# Patient Record
Sex: FEMALE | Race: BLACK OR AFRICAN AMERICAN | NOT HISPANIC OR LATINO | URBAN - METROPOLITAN AREA
[De-identification: names, ages, dates, MRNs, and addresses within clinical notes are randomized per-mention and may not be internally consistent; named-entity substitution may affect disease eponyms.]

---

## 2017-03-19 ENCOUNTER — INPATIENT (INPATIENT)
Facility: HOSPITAL | Age: 82
LOS: 9 days | Discharge: HOME HEALTH | End: 2017-03-29
Attending: INTERNAL MEDICINE | Admitting: INTERNAL MEDICINE
Payer: MEDICARE

## 2017-03-19 VITALS
WEIGHT: 139.99 LBS | TEMPERATURE: 98 F | OXYGEN SATURATION: 98 % | DIASTOLIC BLOOD PRESSURE: 76 MMHG | RESPIRATION RATE: 19 BRPM | HEIGHT: 60 IN | HEART RATE: 81 BPM | SYSTOLIC BLOOD PRESSURE: 109 MMHG

## 2017-03-19 DIAGNOSIS — Z99.3 DEPENDENCE ON WHEELCHAIR: ICD-10-CM

## 2017-03-19 DIAGNOSIS — G30.9 ALZHEIMER'S DISEASE, UNSPECIFIED: ICD-10-CM

## 2017-03-19 DIAGNOSIS — F02.81 DEMENTIA IN OTHER DISEASES CLASSIFIED ELSEWHERE, UNSPECIFIED SEVERITY, WITH BEHAVIORAL DISTURBANCE: ICD-10-CM

## 2017-03-19 DIAGNOSIS — G92 TOXIC ENCEPHALOPATHY: ICD-10-CM

## 2017-03-19 DIAGNOSIS — K21.9 GASTRO-ESOPHAGEAL REFLUX DISEASE WITHOUT ESOPHAGITIS: ICD-10-CM

## 2017-03-19 DIAGNOSIS — Z79.01 LONG TERM (CURRENT) USE OF ANTICOAGULANTS: ICD-10-CM

## 2017-03-19 DIAGNOSIS — Z86.711 PERSONAL HISTORY OF PULMONARY EMBOLISM: ICD-10-CM

## 2017-03-19 DIAGNOSIS — Z99.81 DEPENDENCE ON SUPPLEMENTAL OXYGEN: ICD-10-CM

## 2017-03-19 DIAGNOSIS — Z87.891 PERSONAL HISTORY OF NICOTINE DEPENDENCE: ICD-10-CM

## 2017-03-19 DIAGNOSIS — J96.21 ACUTE AND CHRONIC RESPIRATORY FAILURE WITH HYPOXIA: ICD-10-CM

## 2017-03-19 DIAGNOSIS — Z85.43 PERSONAL HISTORY OF MALIGNANT NEOPLASM OF OVARY: ICD-10-CM

## 2017-03-19 DIAGNOSIS — A41.9 SEPSIS, UNSPECIFIED ORGANISM: ICD-10-CM

## 2017-03-19 DIAGNOSIS — Z78.1 PHYSICAL RESTRAINT STATUS: ICD-10-CM

## 2017-03-19 DIAGNOSIS — Z88.8 ALLERGY STATUS TO OTHER DRUGS, MEDICAMENTS AND BIOLOGICAL SUBSTANCES STATUS: ICD-10-CM

## 2017-03-19 DIAGNOSIS — J96.22 ACUTE AND CHRONIC RESPIRATORY FAILURE WITH HYPERCAPNIA: ICD-10-CM

## 2017-03-19 DIAGNOSIS — I44.1 ATRIOVENTRICULAR BLOCK, SECOND DEGREE: ICD-10-CM

## 2017-03-19 DIAGNOSIS — N39.0 URINARY TRACT INFECTION, SITE NOT SPECIFIED: ICD-10-CM

## 2017-03-19 DIAGNOSIS — J44.1 CHRONIC OBSTRUCTIVE PULMONARY DISEASE WITH (ACUTE) EXACERBATION: ICD-10-CM

## 2017-03-19 DIAGNOSIS — D64.9 ANEMIA, UNSPECIFIED: ICD-10-CM

## 2017-03-19 DIAGNOSIS — J18.9 PNEUMONIA, UNSPECIFIED ORGANISM: ICD-10-CM

## 2017-03-19 DIAGNOSIS — J44.0 CHRONIC OBSTRUCTIVE PULMONARY DISEASE WITH ACUTE LOWER RESPIRATORY INFECTION: ICD-10-CM

## 2017-03-19 DIAGNOSIS — J47.9 BRONCHIECTASIS, UNCOMPLICATED: ICD-10-CM

## 2017-03-19 DIAGNOSIS — J20.9 ACUTE BRONCHITIS, UNSPECIFIED: ICD-10-CM

## 2017-03-19 DIAGNOSIS — R00.1 BRADYCARDIA, UNSPECIFIED: ICD-10-CM

## 2017-03-19 LAB
ALBUMIN SERPL ELPH-MCNC: 3 G/DL — LOW (ref 3.3–5)
ALP SERPL-CCNC: 101 U/L — SIGNIFICANT CHANGE UP (ref 40–120)
ALT FLD-CCNC: 12 U/L — SIGNIFICANT CHANGE UP (ref 12–78)
ANION GAP SERPL CALC-SCNC: 6 MMOL/L — SIGNIFICANT CHANGE UP (ref 5–17)
AST SERPL-CCNC: 15 U/L — SIGNIFICANT CHANGE UP (ref 15–37)
BASE EXCESS BLDA CALC-SCNC: 6.5 MMOL/L — HIGH (ref -2–2)
BASOPHILS # BLD AUTO: 0.2 K/UL — SIGNIFICANT CHANGE UP (ref 0–0.2)
BASOPHILS NFR BLD AUTO: 1.8 % — SIGNIFICANT CHANGE UP (ref 0–2)
BILIRUB SERPL-MCNC: 0.6 MG/DL — SIGNIFICANT CHANGE UP (ref 0.2–1.2)
BLOOD GAS COMMENTS: SIGNIFICANT CHANGE UP
BLOOD GAS COMMENTS: SIGNIFICANT CHANGE UP
BLOOD GAS SOURCE: SIGNIFICANT CHANGE UP
BUN SERPL-MCNC: 17 MG/DL — SIGNIFICANT CHANGE UP (ref 7–23)
CALCIUM SERPL-MCNC: 8.5 MG/DL — SIGNIFICANT CHANGE UP (ref 8.5–10.1)
CHLORIDE SERPL-SCNC: 101 MMOL/L — SIGNIFICANT CHANGE UP (ref 96–108)
CO2 SERPL-SCNC: 37 MMOL/L — HIGH (ref 22–31)
CREAT SERPL-MCNC: 1.12 MG/DL — SIGNIFICANT CHANGE UP (ref 0.5–1.3)
D DIMER BLD IA.RAPID-MCNC: 1618 NG/ML DDU — HIGH
EOSINOPHIL # BLD AUTO: 0.1 K/UL — SIGNIFICANT CHANGE UP (ref 0–0.5)
EOSINOPHIL NFR BLD AUTO: 1.3 % — SIGNIFICANT CHANGE UP (ref 0–6)
GLUCOSE SERPL-MCNC: 110 MG/DL — HIGH (ref 70–99)
HCO3 BLDA-SCNC: 34 MMOL/L — HIGH (ref 21–29)
HCT VFR BLD CALC: 35.5 % — SIGNIFICANT CHANGE UP (ref 34.5–45)
HGB BLD-MCNC: 10.9 G/DL — LOW (ref 11.5–15.5)
HOROWITZ INDEX BLDA+IHG-RTO: 60 — SIGNIFICANT CHANGE UP
LACTATE SERPL-SCNC: 2.2 MMOL/L — HIGH (ref 0.7–2)
LYMPHOCYTES # BLD AUTO: 2 K/UL — SIGNIFICANT CHANGE UP (ref 1–3.3)
LYMPHOCYTES # BLD AUTO: 22.4 % — SIGNIFICANT CHANGE UP (ref 13–44)
MCHC RBC-ENTMCNC: 27.2 PG — SIGNIFICANT CHANGE UP (ref 27–34)
MCHC RBC-ENTMCNC: 30.6 GM/DL — LOW (ref 32–36)
MCV RBC AUTO: 89 FL — SIGNIFICANT CHANGE UP (ref 80–100)
MONOCYTES # BLD AUTO: 1 K/UL — HIGH (ref 0–0.9)
MONOCYTES NFR BLD AUTO: 11.1 % — SIGNIFICANT CHANGE UP (ref 2–14)
NEUTROPHILS # BLD AUTO: 5.7 K/UL — SIGNIFICANT CHANGE UP (ref 1.8–7.4)
NEUTROPHILS NFR BLD AUTO: 63.4 % — SIGNIFICANT CHANGE UP (ref 43–77)
PCO2 BLDA: 68 MMHG — HIGH (ref 32–46)
PH BLD: 7.32 — LOW (ref 7.35–7.45)
PLATELET # BLD AUTO: 259 K/UL — SIGNIFICANT CHANGE UP (ref 150–400)
PO2 BLDA: 80 MMHG — SIGNIFICANT CHANGE UP (ref 74–108)
POTASSIUM SERPL-MCNC: 4 MMOL/L — SIGNIFICANT CHANGE UP (ref 3.5–5.3)
POTASSIUM SERPL-SCNC: 4 MMOL/L — SIGNIFICANT CHANGE UP (ref 3.5–5.3)
PROT SERPL-MCNC: 8.6 GM/DL — HIGH (ref 6–8.3)
RBC # BLD: 3.99 M/UL — SIGNIFICANT CHANGE UP (ref 3.8–5.2)
RBC # FLD: 20.3 % — HIGH (ref 11–15)
SAO2 % BLDA: 94 % — SIGNIFICANT CHANGE UP (ref 92–96)
SODIUM SERPL-SCNC: 144 MMOL/L — SIGNIFICANT CHANGE UP (ref 135–145)
TROPONIN I SERPL-MCNC: <.015 NG/ML — SIGNIFICANT CHANGE UP (ref 0.01–0.04)
WBC # BLD: 9.1 K/UL — SIGNIFICANT CHANGE UP (ref 3.8–10.5)
WBC # FLD AUTO: 9.1 K/UL — SIGNIFICANT CHANGE UP (ref 3.8–10.5)

## 2017-03-19 PROCEDURE — 99291 CRITICAL CARE FIRST HOUR: CPT

## 2017-03-19 PROCEDURE — 71010: CPT | Mod: 26

## 2017-03-19 RX ORDER — VANCOMYCIN HCL 1 G
1000 VIAL (EA) INTRAVENOUS ONCE
Qty: 0 | Refills: 0 | Status: COMPLETED | OUTPATIENT
Start: 2017-03-19 | End: 2017-03-19

## 2017-03-19 RX ORDER — PIPERACILLIN AND TAZOBACTAM 4; .5 G/20ML; G/20ML
3.38 INJECTION, POWDER, LYOPHILIZED, FOR SOLUTION INTRAVENOUS ONCE
Qty: 0 | Refills: 0 | Status: COMPLETED | OUTPATIENT
Start: 2017-03-19 | End: 2017-03-19

## 2017-03-19 RX ORDER — IPRATROPIUM/ALBUTEROL SULFATE 18-103MCG
3 AEROSOL WITH ADAPTER (GRAM) INHALATION
Qty: 0 | Refills: 0 | Status: COMPLETED | OUTPATIENT
Start: 2017-03-19 | End: 2017-03-19

## 2017-03-19 RX ADMIN — Medication 250 MILLIGRAM(S): at 22:41

## 2017-03-19 RX ADMIN — Medication 3 MILLILITER(S): at 21:57

## 2017-03-19 RX ADMIN — PIPERACILLIN AND TAZOBACTAM 200 GRAM(S): 4; .5 INJECTION, POWDER, LYOPHILIZED, FOR SOLUTION INTRAVENOUS at 21:56

## 2017-03-19 RX ADMIN — Medication 3 MILLILITER(S): at 21:00

## 2017-03-19 RX ADMIN — Medication 3 MILLILITER(S): at 21:30

## 2017-03-19 NOTE — ED PROVIDER NOTE - ADDITIONAL HPI
PMHx:  no pertinent Pmhx except mentioned in HPI  PSHx: no pertinent Pshx except mentioned in  HPI  FHx:  no pertinent Fhx except mentioned in  HPI  Soc Hx: unknown

## 2017-03-19 NOTE — ED PROVIDER NOTE - PHYSICAL EXAMINATION
GEN: Alert, NAD  HEAD: atraumatic, EOMI, PERRL, normal lids/conjunctiva  ENT: normal hearing, patent oropharynx without erythema/exudate, uvula midline  NECK: +supple, no tenderness/meningismus, +Trachea midline  PULM: Bilateral BS,mild resp effort, no wheeze/stridor/retractions, mild diffuse rhonochi  CV: RRR, no M/R/G, +dist ext pulses  ABD: soft, NT/ND  BACK: no CVAT, no midline tenderness  MSK: no edema/erythema/cyanosis  SKIN: no rash  NEURO: Alert, no sensory/motor deficits, at her baseline

## 2017-03-19 NOTE — ED ADULT NURSE NOTE - OBJECTIVE STATEMENT
Pt is 89 /yo female BIBA for SOB, cough, fever/ chills. As per family pt has SOB w/ productive cough. Pt has hx of dementia, htn. PE, aspirated pneumonia, Pt is 89 /yo female A&Ox1  BIBA for SOB, cough, fever/ chills. As per family pt has SOB w/ productive cough x 2 days. Pt has hx of dementia, htn. PE, aspirated pneumonia.

## 2017-03-19 NOTE — ED ADULT TRIAGE NOTE - BP NONINVASIVE DIASTOLIC (MM HG)
"You can access the [facility], offered by [facility], by registering with the following website: [email or web address]"
76

## 2017-03-19 NOTE — ED PROVIDER NOTE - PRESENTING SYMPTOMS DETAILS
89 year old female PMH; HTN, Dementia, hx PE comes in w sob. started 2 days ago per family, w mild cough and a fever. no v/d. no signs of abdpain or pain elsewehre per family. pt nonverbal but neurologicalyl at her baseline per family  ros: limited

## 2017-03-19 NOTE — ED ADULT TRIAGE NOTE - CHIEF COMPLAINT QUOTE
Patient BIBA: patient with difficultly breathing  for 1 week; saturation between 60-90 on 3lpm nasal cannula at home; on EMS arrival saturation 93% on NC, 100% on non rebreather. Patient with dementia. Respirations even non-labored.

## 2017-03-20 DIAGNOSIS — I10 ESSENTIAL (PRIMARY) HYPERTENSION: ICD-10-CM

## 2017-03-20 DIAGNOSIS — J18.9 PNEUMONIA, UNSPECIFIED ORGANISM: ICD-10-CM

## 2017-03-20 LAB
ALBUMIN SERPL ELPH-MCNC: 2.6 G/DL — LOW (ref 3.3–5)
ALP SERPL-CCNC: 84 U/L — SIGNIFICANT CHANGE UP (ref 40–120)
ALT FLD-CCNC: 12 U/L — SIGNIFICANT CHANGE UP (ref 12–78)
ANION GAP SERPL CALC-SCNC: 8 MMOL/L — SIGNIFICANT CHANGE UP (ref 5–17)
APPEARANCE UR: ABNORMAL
AST SERPL-CCNC: 15 U/L — SIGNIFICANT CHANGE UP (ref 15–37)
BACTERIA # UR AUTO: ABNORMAL
BASE EXCESS BLDA CALC-SCNC: 5.9 MMOL/L — HIGH (ref -2–2)
BASE EXCESS BLDA CALC-SCNC: 7.1 MMOL/L — HIGH (ref -2–2)
BASE EXCESS BLDA CALC-SCNC: 7.1 MMOL/L — HIGH (ref -2–2)
BASE EXCESS BLDA CALC-SCNC: 7.5 MMOL/L — HIGH (ref -2–2)
BILIRUB SERPL-MCNC: 0.4 MG/DL — SIGNIFICANT CHANGE UP (ref 0.2–1.2)
BILIRUB UR-MCNC: NEGATIVE — SIGNIFICANT CHANGE UP
BLOOD GAS COMMENTS: SIGNIFICANT CHANGE UP
BLOOD GAS SOURCE: SIGNIFICANT CHANGE UP
BUN SERPL-MCNC: 18 MG/DL — SIGNIFICANT CHANGE UP (ref 7–23)
CALCIUM SERPL-MCNC: 7.7 MG/DL — LOW (ref 8.5–10.1)
CHLORIDE SERPL-SCNC: 102 MMOL/L — SIGNIFICANT CHANGE UP (ref 96–108)
CO2 SERPL-SCNC: 33 MMOL/L — HIGH (ref 22–31)
COLOR SPEC: YELLOW — SIGNIFICANT CHANGE UP
CREAT SERPL-MCNC: 1.04 MG/DL — SIGNIFICANT CHANGE UP (ref 0.5–1.3)
DIFF PNL FLD: ABNORMAL
EPI CELLS # UR: SIGNIFICANT CHANGE UP
GLUCOSE SERPL-MCNC: 89 MG/DL — SIGNIFICANT CHANGE UP (ref 70–99)
GLUCOSE UR QL: NEGATIVE MG/DL — SIGNIFICANT CHANGE UP
HCO3 BLDA-SCNC: 32 MMOL/L — HIGH (ref 21–29)
HCO3 BLDA-SCNC: 33 MMOL/L — HIGH (ref 21–29)
HCO3 BLDA-SCNC: 34 MMOL/L — HIGH (ref 21–29)
HCO3 BLDA-SCNC: 34 MMOL/L — HIGH (ref 21–29)
HCT VFR BLD CALC: 30.5 % — LOW (ref 34.5–45)
HGB BLD-MCNC: 9.7 G/DL — LOW (ref 11.5–15.5)
HOROWITZ INDEX BLDA+IHG-RTO: 45 — SIGNIFICANT CHANGE UP
HOROWITZ INDEX BLDA+IHG-RTO: 50 — SIGNIFICANT CHANGE UP
HOROWITZ INDEX BLDA+IHG-RTO: 50 — SIGNIFICANT CHANGE UP
HOROWITZ INDEX BLDA+IHG-RTO: 60 — SIGNIFICANT CHANGE UP
KETONES UR-MCNC: NEGATIVE — SIGNIFICANT CHANGE UP
LACTATE SERPL-SCNC: 0.8 MMOL/L — SIGNIFICANT CHANGE UP (ref 0.7–2)
LACTATE SERPL-SCNC: 3 MMOL/L — HIGH (ref 0.7–2)
LEUKOCYTE ESTERASE UR-ACNC: ABNORMAL
MAGNESIUM SERPL-MCNC: 2.1 MG/DL — SIGNIFICANT CHANGE UP (ref 1.8–2.4)
MCHC RBC-ENTMCNC: 27.9 PG — SIGNIFICANT CHANGE UP (ref 27–34)
MCHC RBC-ENTMCNC: 32 GM/DL — SIGNIFICANT CHANGE UP (ref 32–36)
MCV RBC AUTO: 87.4 FL — SIGNIFICANT CHANGE UP (ref 80–100)
NITRITE UR-MCNC: NEGATIVE — SIGNIFICANT CHANGE UP
PCO2 BLDA: 48 MMHG — HIGH (ref 32–46)
PCO2 BLDA: 63 MMHG — HIGH (ref 32–46)
PCO2 BLDA: 68 MMHG — HIGH (ref 32–46)
PCO2 BLDA: 70 MMHG — CRITICAL HIGH (ref 32–46)
PH BLD: 7.31 — LOW (ref 7.35–7.45)
PH BLD: 7.31 — LOW (ref 7.35–7.45)
PH BLD: 7.35 — SIGNIFICANT CHANGE UP (ref 7.35–7.45)
PH BLD: 7.44 — SIGNIFICANT CHANGE UP (ref 7.35–7.45)
PH UR: 5 — SIGNIFICANT CHANGE UP (ref 4.8–8)
PHOSPHATE SERPL-MCNC: 3.7 MG/DL — SIGNIFICANT CHANGE UP (ref 2.5–4.5)
PLATELET # BLD AUTO: 206 K/UL — SIGNIFICANT CHANGE UP (ref 150–400)
PO2 BLDA: 67 MMHG — LOW (ref 74–108)
PO2 BLDA: 69 MMHG — LOW (ref 74–108)
PO2 BLDA: 80 MMHG — SIGNIFICANT CHANGE UP (ref 74–108)
PO2 BLDA: 85 MMHG — SIGNIFICANT CHANGE UP (ref 74–108)
POTASSIUM SERPL-MCNC: 4.3 MMOL/L — SIGNIFICANT CHANGE UP (ref 3.5–5.3)
POTASSIUM SERPL-SCNC: 4.3 MMOL/L — SIGNIFICANT CHANGE UP (ref 3.5–5.3)
PROT SERPL-MCNC: 7.3 GM/DL — SIGNIFICANT CHANGE UP (ref 6–8.3)
PROT UR-MCNC: 30 MG/DL
RBC # BLD: 3.49 M/UL — LOW (ref 3.8–5.2)
RBC # FLD: 19.9 % — HIGH (ref 11–15)
RBC CASTS # UR COMP ASSIST: ABNORMAL /HPF (ref 0–4)
SAO2 % BLDA: 90 % — LOW (ref 92–96)
SAO2 % BLDA: 91 % — LOW (ref 92–96)
SAO2 % BLDA: 95 % — SIGNIFICANT CHANGE UP (ref 92–96)
SAO2 % BLDA: 96 % — SIGNIFICANT CHANGE UP (ref 92–96)
SODIUM SERPL-SCNC: 143 MMOL/L — SIGNIFICANT CHANGE UP (ref 135–145)
SP GR SPEC: 1.01 — SIGNIFICANT CHANGE UP (ref 1.01–1.02)
UROBILINOGEN FLD QL: NEGATIVE MG/DL — SIGNIFICANT CHANGE UP
WBC # BLD: 7.6 K/UL — SIGNIFICANT CHANGE UP (ref 3.8–10.5)
WBC # FLD AUTO: 7.6 K/UL — SIGNIFICANT CHANGE UP (ref 3.8–10.5)
WBC UR QL: >50

## 2017-03-20 PROCEDURE — 71010: CPT | Mod: 26

## 2017-03-20 PROCEDURE — 71275 CT ANGIOGRAPHY CHEST: CPT | Mod: 26

## 2017-03-20 PROCEDURE — 99291 CRITICAL CARE FIRST HOUR: CPT

## 2017-03-20 RX ORDER — PANTOPRAZOLE SODIUM 20 MG/1
40 TABLET, DELAYED RELEASE ORAL
Qty: 0 | Refills: 0 | Status: DISCONTINUED | OUTPATIENT
Start: 2017-03-20 | End: 2017-03-29

## 2017-03-20 RX ORDER — IPRATROPIUM/ALBUTEROL SULFATE 18-103MCG
3 AEROSOL WITH ADAPTER (GRAM) INHALATION EVERY 6 HOURS
Qty: 0 | Refills: 0 | Status: DISCONTINUED | OUTPATIENT
Start: 2017-03-20 | End: 2017-03-29

## 2017-03-20 RX ORDER — AZITHROMYCIN 500 MG/1
TABLET, FILM COATED ORAL
Qty: 0 | Refills: 0 | Status: DISCONTINUED | OUTPATIENT
Start: 2017-03-20 | End: 2017-03-24

## 2017-03-20 RX ORDER — AZITHROMYCIN 500 MG/1
500 TABLET, FILM COATED ORAL EVERY 24 HOURS
Qty: 0 | Refills: 0 | Status: DISCONTINUED | OUTPATIENT
Start: 2017-03-21 | End: 2017-03-24

## 2017-03-20 RX ORDER — CEFTRIAXONE 500 MG/1
1 INJECTION, POWDER, FOR SOLUTION INTRAMUSCULAR; INTRAVENOUS EVERY 24 HOURS
Qty: 0 | Refills: 0 | Status: DISCONTINUED | OUTPATIENT
Start: 2017-03-21 | End: 2017-03-24

## 2017-03-20 RX ORDER — HEPARIN SODIUM 5000 [USP'U]/ML
5000 INJECTION INTRAVENOUS; SUBCUTANEOUS EVERY 12 HOURS
Qty: 0 | Refills: 0 | Status: DISCONTINUED | OUTPATIENT
Start: 2017-03-20 | End: 2017-03-29

## 2017-03-20 RX ORDER — CEFTRIAXONE 500 MG/1
1 INJECTION, POWDER, FOR SOLUTION INTRAMUSCULAR; INTRAVENOUS ONCE
Qty: 0 | Refills: 0 | Status: COMPLETED | OUTPATIENT
Start: 2017-03-20 | End: 2017-03-20

## 2017-03-20 RX ORDER — AMLODIPINE BESYLATE 2.5 MG/1
2.5 TABLET ORAL DAILY
Qty: 0 | Refills: 0 | Status: DISCONTINUED | OUTPATIENT
Start: 2017-03-20 | End: 2017-03-29

## 2017-03-20 RX ORDER — SODIUM CHLORIDE 9 MG/ML
500 INJECTION INTRAMUSCULAR; INTRAVENOUS; SUBCUTANEOUS ONCE
Qty: 0 | Refills: 0 | Status: COMPLETED | OUTPATIENT
Start: 2017-03-20 | End: 2017-03-20

## 2017-03-20 RX ORDER — CEFTRIAXONE 500 MG/1
INJECTION, POWDER, FOR SOLUTION INTRAMUSCULAR; INTRAVENOUS
Qty: 0 | Refills: 0 | Status: DISCONTINUED | OUTPATIENT
Start: 2017-03-20 | End: 2017-03-24

## 2017-03-20 RX ORDER — AZITHROMYCIN 500 MG/1
500 TABLET, FILM COATED ORAL ONCE
Qty: 0 | Refills: 0 | Status: COMPLETED | OUTPATIENT
Start: 2017-03-20 | End: 2017-03-20

## 2017-03-20 RX ADMIN — HEPARIN SODIUM 5000 UNIT(S): 5000 INJECTION INTRAVENOUS; SUBCUTANEOUS at 06:20

## 2017-03-20 RX ADMIN — Medication 3 MILLILITER(S): at 06:20

## 2017-03-20 RX ADMIN — HEPARIN SODIUM 5000 UNIT(S): 5000 INJECTION INTRAVENOUS; SUBCUTANEOUS at 18:02

## 2017-03-20 RX ADMIN — AZITHROMYCIN 255 MILLIGRAM(S): 500 TABLET, FILM COATED ORAL at 12:30

## 2017-03-20 RX ADMIN — CEFTRIAXONE 100 GRAM(S): 500 INJECTION, POWDER, FOR SOLUTION INTRAMUSCULAR; INTRAVENOUS at 12:29

## 2017-03-20 RX ADMIN — Medication 3 MILLILITER(S): at 17:27

## 2017-03-20 RX ADMIN — Medication 3 MILLILITER(S): at 12:49

## 2017-03-20 RX ADMIN — SODIUM CHLORIDE 100 MILLILITER(S): 9 INJECTION INTRAMUSCULAR; INTRAVENOUS; SUBCUTANEOUS at 05:32

## 2017-03-20 NOTE — H&P ADULT. - HISTORY OF PRESENT ILLNESS
Dr. Michi Best D.P.M.   [facility]  [address] [place] [address] 101  232 [facility] 97  5/25/2021    Post Operative Nail/Wart Instructions    Soaking solution: Betadine Solution 89 Y/O female PMHx of Dementia, PE, chair bound at home with 24 hour aide BIBEMS for decreasing O2 saturation going from 2LNC to 4LNC at home. While in ED, pt was documented has having increasing CO2 levels while on BIPAP. ICU called for further evaluation.

## 2017-03-20 NOTE — H&P ADULT. - PSH
S/P [facility] Biopsy of Cervix with vag laceration repair    S/P D&C, operative hysteroscopy    Sigmoidoscopy Exam - Negative

## 2017-03-20 NOTE — H&P ADULT. - PROBLEM SELECTOR PLAN 1
1. admit to ICU under Dr. Delvin mercer/ [facility] for medicine.  2. repeat CXR and ABG, make bipap changes accordingly  3. duonebs q 6 hrs  4. consider intubation if condition worsens

## 2017-03-20 NOTE — H&P ADULT. - ATTENDING COMMENTS
Pt is 89 yo F with h/o HTN, PE, on home O2?, dementia and chair bound at home with 24 hour aide BIBEMS for decreasing O2 saturation going from 2LNC to 4LNC at home. While in ED, pt was documented has having increasing CO2 levels while on BIPAP. ICU called for further evaluation. Pt is 89 yo F with h/o HTN, PE, on home O2?, dementia and chair bound at home with 24 hour aide BIBEMS for decreasing O2 saturation going from 2LNC to 4LNC at home. In the ER pt was found to be mildly hypercapneic and placed on BiPAP. Pt worked up with CTA and found to have possible R LL PNA.    Vs as per EMR    Face: BiPAP mask  Lungs: CTA  Heart: S1S2  Abd: Soft/+BS  Ext: No edema  Neuro: Awake but not following commands    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 is a COPDer)/ 2) R LL PNA    Resp: Cont BiPAP and Nebs  ID: Start on Ceftriaxone and Zithromax  CVS: Transient hypotension and bradycardia  FEN: NPO while on BiPAP  GI: PPI  Neuro: MS probably at baseline  Social: Obtain more MHx and discuss code staus    CCT 40 min

## 2017-03-20 NOTE — H&P ADULT. - PMH
Alzheimer's Dementia with Behavioral Disturbance    Benign Essential Hypertension    Ovarian cancer    PE (pulmonary thromboembolism)    UTI (urinary tract infection)

## 2017-03-21 DIAGNOSIS — N39.0 URINARY TRACT INFECTION, SITE NOT SPECIFIED: ICD-10-CM

## 2017-03-21 LAB
ANION GAP SERPL CALC-SCNC: 11 MMOL/L — SIGNIFICANT CHANGE UP (ref 5–17)
BUN SERPL-MCNC: 15 MG/DL — SIGNIFICANT CHANGE UP (ref 7–23)
CALCIUM SERPL-MCNC: 8.5 MG/DL — SIGNIFICANT CHANGE UP (ref 8.5–10.1)
CHLORIDE SERPL-SCNC: 101 MMOL/L — SIGNIFICANT CHANGE UP (ref 96–108)
CO2 SERPL-SCNC: 30 MMOL/L — SIGNIFICANT CHANGE UP (ref 22–31)
CREAT SERPL-MCNC: 0.67 MG/DL — SIGNIFICANT CHANGE UP (ref 0.5–1.3)
CULTURE RESULTS: NO GROWTH — SIGNIFICANT CHANGE UP
GLUCOSE SERPL-MCNC: 61 MG/DL — LOW (ref 70–99)
HCT VFR BLD CALC: 31.7 % — LOW (ref 34.5–45)
HGB BLD-MCNC: 9.3 G/DL — LOW (ref 11.5–15.5)
MAGNESIUM SERPL-MCNC: 2.1 MG/DL — SIGNIFICANT CHANGE UP (ref 1.8–2.4)
MCHC RBC-ENTMCNC: 26.2 PG — LOW (ref 27–34)
MCHC RBC-ENTMCNC: 29.4 GM/DL — LOW (ref 32–36)
MCV RBC AUTO: 89 FL — SIGNIFICANT CHANGE UP (ref 80–100)
PHOSPHATE SERPL-MCNC: 2.8 MG/DL — SIGNIFICANT CHANGE UP (ref 2.5–4.5)
PLATELET # BLD AUTO: 218 K/UL — SIGNIFICANT CHANGE UP (ref 150–400)
POTASSIUM SERPL-MCNC: 4.3 MMOL/L — SIGNIFICANT CHANGE UP (ref 3.5–5.3)
POTASSIUM SERPL-SCNC: 4.3 MMOL/L — SIGNIFICANT CHANGE UP (ref 3.5–5.3)
RBC # BLD: 3.57 M/UL — LOW (ref 3.8–5.2)
RBC # FLD: 20 % — HIGH (ref 11–15)
SODIUM SERPL-SCNC: 142 MMOL/L — SIGNIFICANT CHANGE UP (ref 135–145)
SPECIMEN SOURCE: SIGNIFICANT CHANGE UP
WBC # BLD: 5.2 K/UL — SIGNIFICANT CHANGE UP (ref 3.8–10.5)
WBC # FLD AUTO: 5.2 K/UL — SIGNIFICANT CHANGE UP (ref 3.8–10.5)

## 2017-03-21 PROCEDURE — 99291 CRITICAL CARE FIRST HOUR: CPT

## 2017-03-21 RX ADMIN — Medication 3 MILLILITER(S): at 00:43

## 2017-03-21 RX ADMIN — PANTOPRAZOLE SODIUM 40 MILLIGRAM(S): 20 TABLET, DELAYED RELEASE ORAL at 05:52

## 2017-03-21 RX ADMIN — AMLODIPINE BESYLATE 2.5 MILLIGRAM(S): 2.5 TABLET ORAL at 05:50

## 2017-03-21 RX ADMIN — Medication 0.5 MILLIGRAM(S): at 23:57

## 2017-03-21 RX ADMIN — AZITHROMYCIN 255 MILLIGRAM(S): 500 TABLET, FILM COATED ORAL at 10:09

## 2017-03-21 RX ADMIN — Medication 3 MILLILITER(S): at 06:26

## 2017-03-21 RX ADMIN — HEPARIN SODIUM 5000 UNIT(S): 5000 INJECTION INTRAVENOUS; SUBCUTANEOUS at 05:51

## 2017-03-21 RX ADMIN — HEPARIN SODIUM 5000 UNIT(S): 5000 INJECTION INTRAVENOUS; SUBCUTANEOUS at 17:45

## 2017-03-21 RX ADMIN — CEFTRIAXONE 100 GRAM(S): 500 INJECTION, POWDER, FOR SOLUTION INTRAMUSCULAR; INTRAVENOUS at 10:09

## 2017-03-21 NOTE — PROGRESS NOTE ADULT - SUBJECTIVE AND OBJECTIVE BOX
HPI:  Pt is 89 yo F with h/o HTN, PE, on home O2?, dementia and chair bound at home with 24 hour aide BIBEMS for decreasing O2 saturation going from 2LNC to 4LNC at home. In the ER pt was found to be mildly hypercapneic and placed on BiPAP. Pt worked up with CTA and found to have possible R LL PNA.    24 hr events: Uneventful and pt off BiPAP    ## Labs:  CBC:                        9.3    5.2   )-----------( 218      ( 21 Mar 2017 03:49 )             31.7     Chem:  21 Mar 2017 03:49    142    |  101    |  15     ----------------------------<  61     4.3     |  30     |  0.67     Ca    8.5        21 Mar 2017 03:49  Phos  2.8       21 Mar 2017 03:49  Mg     2.1       21 Mar 2017 03:49    TPro  7.3    /  Alb  2.6    /  TBili  0.4    /  DBili  x      /  AST  15     /  ALT  12     /  AlkPhos  84     20 Mar 2017 06:31    Coags:    ## Medications:  cefTRIAXone   IVPB  IV Intermittent   azithromycin  IVPB  IV Intermittent   azithromycin  IVPB 500milliGRAM(s) IV Intermittent every 24 hours  cefTRIAXone   IVPB 1Gram(s) IV Intermittent every 24 hours    amLODIPine   Tablet 2.5milliGRAM(s) Oral daily    ALBUTerol/ipratropium for Nebulization 3milliLiter(s) Nebulizer every 6 hours      heparin  Injectable 5000Unit(s) SubCutaneous every 12 hours    pantoprazole    Tablet 40milliGRAM(s) Oral before breakfast      ## Vitals:  T(C): 37.4, Max: 37.7 (03-21 @ 03:30)  HR: 72 (56 - 100)  BP: 137/112 (102/57 - 155/139)  BP(mean): 121 (69 - 146)  RR: 20 (5 - 24)  SpO2: 98% (76% - 100%)  Wt(kg): --  Vent:   ABG: ABG - ( 20 Mar 2017 18:22 )  pH: x     /  pCO2: 48    /  pO2: 80    / HCO3: 32    / Base Excess: 7.5   /  SaO2: 96          I & Os for current day (as of 03-21 @ 16:55)  =============================================  IN: 300 ml / OUT: 0 ml / NET: 300 ml    ## P/E:  Gen: lying comfortably in bed in no apparent distress  Lungs: Rhonchi  Heart: RRR  Abd: Soft/+BS  Ext: No edema  Neuro: Awake but nonverbal    CENTRAL LINE: [ ] YES [x ] NO  LOCATION:   DATE INSERTED:  REMOVE: [ ] YES [ ] NO      GARCIA: [ ] YES [ ] NO    DATE INSERTED:  REMOVE:  [ ] YES [ ] NO      A-LINE:  [ ] YES [x ] NO  LOCATION:   DATE INSERTED:  REMOVE:  [ ] YES [ ] NO  EXPLAIN:    CODE STATUS: [ x] full code  [ ] DNR  [ ] DNI  [ ] MOLST  Goals of care discussion: [ ] yes

## 2017-03-21 NOTE — PHYSICAL THERAPY INITIAL EVALUATION ADULT - CRITERIA FOR SKILLED THERAPEUTIC INTERVENTIONS
anticipated equipment needs at discharge/anticipated discharge recommendation/Home with prior services

## 2017-03-21 NOTE — CONSULT NOTE ADULT - SUBJECTIVE AND OBJECTIVE BOX
Patient is a 84y old  Female who presents with a chief complaint of sob (17 Mar 2017 08:52)      INTERVAL HPI/OVERNIGHT EVENTS:    MEDICATIONS  (STANDING):  losartan 50milliGRAM(s) Oral daily  atorvastatin 80milliGRAM(s) Oral at bedtime  aspirin  chewable 81milliGRAM(s) Oral daily  dextrose 5%. 1000milliLiter(s) IV Continuous <Continuous>  apixaban 5milliGRAM(s) Oral two times a day  carvedilol 25milliGRAM(s) Oral every 12 hours  spironolactone 25milliGRAM(s) Oral daily    MEDICATIONS  (PRN):  guaiFENesin/dextromethorphan  Syrup 10milliLiter(s) Oral every 6 hours PRN Cough  magnesium hydroxide Suspension 30milliLiter(s) Oral daily PRN Constipation      Allergies    penicillin (Unknown)    Intolerances        REVIEW OF SYSTEMS:  CONSTITUTIONAL: No fever, weight loss, or fatigue  EYES: No eye pain, visual disturbances, or discharge  ENMT:  No difficulty hearing, tinnitus, vertigo; No sinus or throat pain  NECK: No pain or stiffness  BREASTS: No pain, masses, or nipple discharge  RESPIRATORY: No cough, wheezing, chills or hemoptysis; No shortness of breath  CARDIOVASCULAR: No chest pain, palpitations, dizziness, or leg swelling  GASTROINTESTINAL: No abdominal or epigastric pain. No nausea, vomiting, or hematemesis; No diarrhea or constipation. No melena or hematochezia.  GENITOURINARY: No dysuria, frequency, hematuria, or incontinence  NEUROLOGICAL: No headaches, memory loss, loss of strength, numbness, or tremors  SKIN: No itching, burning, rashes, or lesions   LYMPH NODES: No enlarged glands  ENDOCRINE: No heat or cold intolerance; No hair loss  MUSCULOSKELETAL: No joint pain or swelling; No muscle, back, or extremity pain  PSYCHIATRIC: No depression, anxiety, mood swings, or difficulty sleeping  HEME/LYMPH: No easy bruising, or bleeding gums  ALLERGY AND IMMUNOLOGIC: No hives or eczema    Vital Signs Last 24 Hrs  T(C): 37.2, Max: 37.2 (03-21 @ 04:44)  T(F): 98.9, Max: 98.9 (03-21 @ 04:44)  HR: 90 (66 - 108)  BP: 122/68 (112/73 - 150/63)  BP(mean): --  RR: 20 (17 - 20)  SpO2: 99% (98% - 100%)    PHYSICAL EXAM:  GENERAL: NAD, well-groomed, well-developed  HEAD:  Atraumatic, Normocephalic  EYES: EOMI, PERRLA, conjunctiva and sclera clear  ENMT: No tonsillar erythema, exudates, or enlargement; Moist mucous membranes, Good dentition, No lesions  NECK: Supple, No JVD, Normal thyroid  NERVOUS SYSTEM:  Alert & Oriented X3, Good concentration; Motor Strength 5/5 B/L upper and lower extremities; DTRs 2+ intact and symmetric  CHEST/LUNG: Clear to percussion bilaterally; No rales, rhonchi, wheezing, or rubs  HEART: Regular rate and rhythm; No murmurs, rubs, or gallops  ABDOMEN: Soft, Nontender, Nondistended; Bowel sounds present  EXTREMITIES:  2+ Peripheral Pulses, No clubbing, cyanosis, or edema  LYMPH: No lymphadenopathy noted  SKIN: No rashes or lesions    LABS:                        12.2   4.3   )-----------( 129      ( 21 Mar 2017 07:57 )             38.5     21 Mar 2017 07:57    143    |  107    |  9      ----------------------------<  82     4.0     |  29     |  0.69     Ca    8.2        21 Mar 2017 07:57      PT/INR - ( 20 Mar 2017 08:17 )   PT: 16.5 sec;   INR: 1.47 ratio             CAPILLARY BLOOD GLUCOSE    CULTURES:    HEMOGLOBIN A1C:    CHOLESTEROL:        RADIOLOGY & ADDITIONAL TESTS:

## 2017-03-21 NOTE — CONSULT NOTE ADULT - SUBJECTIVE AND OBJECTIVE BOX
Patient is a 89y old  Female who presents with a chief complaint of increasing O2 demands .      HPI:  89 Y/O female PMHx, PE,HTN, and Alzheimers Dementia, chair bound at home with 24 hour aide BIBEMS for decreasing O2 saturation going from 2LNC to 4LNC at home. While in ED, pt was documented has having increasing CO2 levels while on BIPAP. Admitted to ICU with continuous BIPAP support. Being transferred to medical floor after improvement. Not able to provide information due to dementia.    PAST MEDICAL & SURGICAL HISTORY:  UTI (urinary tract infection)  PE (pulmonary thromboembolism)  Ovarian cancer  Alzheimer&#x27;s Dementia with Behavioral Disturbance  Benign Essential Hypertension  Sigmoidoscopy Exam - Negative  S/P [facility] Biopsy of Cervix with vag laceration repair  S/P D&amp;C, operative hysteroscopy    FAMILY HISTORY:  No pertinent family history in first degree relatives    SOCIAL HISTORY: not available.    Allergies  Haldol (Unknown)  MEDICATIONS  (STANDING):heparin  Injectable 5000Unit(s) SubCutaneous every 12 hours  ALBUTerol/ipratropium for Nebulization 3milliLiter(s) Nebulizer every 6 hours  amLODIPine   Tablet 2.5milliGRAM(s) Oral daily  pantoprazole    Tablet 40milliGRAM(s) Oral before breakfast  cefTRIAXone   IVPB  IV Intermittent   azithromycin  IVPB  IV Intermittent   azithromycin  IVPB 500milliGRAM(s) IV Intermittent every 24 hours  cefTRIAXone   IVPB 1Gram(s) IV Intermittent every 24 hours    REVIEW OF SYSTEMS: not able to provide.    Vital Signs Last 24 Hrs  T(C): 37.4, Max: 37.7 (03-21 @ 03:30)  T(F): 99.4, Max: 99.9 (03- @ 03:30)  HR: 72 (56 - 100)  BP: 137/112 (102/57 - 155/139)  BP(mean): 121 (69 - 146)  RR: 20 (5 - 24)  SpO2: 98% (76% - 100%)    PHYSICAL EXAM:  GEN:        Awake, responsive and comfortable.  HEENT:    Normal.    RESP:      decreased air entry.  CVS:         Regular rate and rhythm.   ABD:         Soft, non-tender, non-distended;   :             No costovertebral angle tenderness  SKIN:           Warm and dry.  EXTR:            No clubbing, cyanosis or edema  CNS:              confused at base line.  PSYCH:        cooperative, no anxiety or depression    LABS:                        9.3    5.2   )-----------( 218      ( 21 Mar 2017 03:49 )             31.7     21 Mar 2017 03:49    142    |  101    |  15     ----------------------------<  61     4.3     |  30     |  0.67     Ca    8.5        21 Mar 2017 03:49  Phos  2.8       21 Mar 2017 03:49  Mg     2.1       21 Mar 2017 03:49    TPro  7.3    /  Alb  2.6    /  TBili  0.4    /  DBili  x      /  AST  15     /  ALT  12     /  AlkPhos  84     20 Mar 2017 06:31      ABG - 03-20 @ 18:22  pH: 7.44 / pcO2: 48 / pO2: 80 on BIPAP with 45% FIO2.    Urinalysis Basic - ( 20 Mar 2017 18:08 )    Color: Yellow / Appearance: very cloudy / S.015 / pH: x  Gluc: x / Ketone: Negative  / Bili: Negative / Urobili: Negative mg/dL   Blood: x / Protein: 30 mg/dL / Nitrite: Negative   Leuk Esterase: Moderate / RBC: 6-10 /HPF / WBC >50   Sq Epi: x / Non Sq Epi: Few / Bacteria: Many    EKG: sinus rhythm    RADIOLOGY & ADDITIONAL STUDIES:    EXAM:  CHEST SINGLE VIEW                            PROCEDURE DATE:  2017        INTERPRETATION:  PROCEDURE: AP view of the chest.    CLINICAL INFORMATION: Shortness of breath    COMPARISON: 3/19/2017    FINDINGS:        There are no lung consolidations. A small left pleural effusion is noted.   The cardiac and mediastinal contours are prominent, which may be due to   magnification from AP technique and shallow inspiration. The osseous   structures are intact.    IMPRESSION:    No lung consolidations.  Small left pleural effusion.    OTILIO BLOOM M.D., ATTENDING RADIOLOGIST  This document has been electronically signed. Mar 20 2017  8:01AM      ASSESSMENT AND PLAN:  ·	Acute hypoxic/hypercarbic Respiratory failure.  ·	RLL Pneumonia.  ·	Anemia.  ·	HTN.  ·	Alzheimer Dementia.    Supplemental O2 with PRN BIPAP.  Continue nebulizer and antibiotics.

## 2017-03-21 NOTE — CONSULT NOTE ADULT - ASSESSMENT
Patient is a 84y old  Female who presents with a chief complaint of sob placed on BIPAP and admitted to ICU.

## 2017-03-21 NOTE — PHYSICAL THERAPY INITIAL EVALUATION ADULT - MODIFIED CLINICAL TEST OF SENSORY INTEGRATION IN BALANCE TEST
Barthel Index: Feeding Score _0__, Bathing Score _0__, Grooming Score _0__, Dressing Score _0__, Bowels Score _0__, Bladder Score _0__, Toilet Score _0__, Transfers Score _0__, Mobility Score _0__, Stairs Score _0__,     Total Score _0__

## 2017-03-21 NOTE — PHYSICAL THERAPY INITIAL EVALUATION ADULT - COORDINATION ASSESSED, REHAB EVAL
Unable to accurately assess/finger to nose/heel to shin finger to nose/Unable to accurately assess/heel to shin

## 2017-03-21 NOTE — PHYSICAL THERAPY INITIAL EVALUATION ADULT - ADDITIONAL COMMENTS
Patient required nivia lift (dependent mechanical lift) for transfers from bed to wheelchair to bed. Patient is non-ambulatory and dependent in all aspects of care.

## 2017-03-22 DIAGNOSIS — C56.9 MALIGNANT NEOPLASM OF UNSPECIFIED OVARY: ICD-10-CM

## 2017-03-22 DIAGNOSIS — G30.8 OTHER ALZHEIMER'S DISEASE: ICD-10-CM

## 2017-03-22 LAB
ANION GAP SERPL CALC-SCNC: 13 MMOL/L — SIGNIFICANT CHANGE UP (ref 5–17)
BUN SERPL-MCNC: 12 MG/DL — SIGNIFICANT CHANGE UP (ref 7–23)
CALCIUM SERPL-MCNC: 8.6 MG/DL — SIGNIFICANT CHANGE UP (ref 8.5–10.1)
CHLORIDE SERPL-SCNC: 100 MMOL/L — SIGNIFICANT CHANGE UP (ref 96–108)
CO2 SERPL-SCNC: 27 MMOL/L — SIGNIFICANT CHANGE UP (ref 22–31)
CREAT SERPL-MCNC: 0.62 MG/DL — SIGNIFICANT CHANGE UP (ref 0.5–1.3)
GLUCOSE SERPL-MCNC: 71 MG/DL — SIGNIFICANT CHANGE UP (ref 70–99)
HCT VFR BLD CALC: 31.5 % — LOW (ref 34.5–45)
HGB BLD-MCNC: 10.1 G/DL — LOW (ref 11.5–15.5)
MAGNESIUM SERPL-MCNC: 2.1 MG/DL — SIGNIFICANT CHANGE UP (ref 1.8–2.4)
MCHC RBC-ENTMCNC: 27.7 PG — SIGNIFICANT CHANGE UP (ref 27–34)
MCHC RBC-ENTMCNC: 32 GM/DL — SIGNIFICANT CHANGE UP (ref 32–36)
MCV RBC AUTO: 86.8 FL — SIGNIFICANT CHANGE UP (ref 80–100)
PHOSPHATE SERPL-MCNC: 3.4 MG/DL — SIGNIFICANT CHANGE UP (ref 2.5–4.5)
PLATELET # BLD AUTO: 250 K/UL — SIGNIFICANT CHANGE UP (ref 150–400)
POTASSIUM SERPL-MCNC: 3.7 MMOL/L — SIGNIFICANT CHANGE UP (ref 3.5–5.3)
POTASSIUM SERPL-SCNC: 3.7 MMOL/L — SIGNIFICANT CHANGE UP (ref 3.5–5.3)
RBC # BLD: 3.63 M/UL — LOW (ref 3.8–5.2)
RBC # FLD: 19.8 % — HIGH (ref 11–15)
SODIUM SERPL-SCNC: 140 MMOL/L — SIGNIFICANT CHANGE UP (ref 135–145)
WBC # BLD: 6.8 K/UL — SIGNIFICANT CHANGE UP (ref 3.8–10.5)
WBC # FLD AUTO: 6.8 K/UL — SIGNIFICANT CHANGE UP (ref 3.8–10.5)

## 2017-03-22 PROCEDURE — 99291 CRITICAL CARE FIRST HOUR: CPT

## 2017-03-22 RX ORDER — QUETIAPINE FUMARATE 200 MG/1
50 TABLET, FILM COATED ORAL AT BEDTIME
Qty: 0 | Refills: 0 | Status: DISCONTINUED | OUTPATIENT
Start: 2017-03-22 | End: 2017-03-23

## 2017-03-22 RX ADMIN — HEPARIN SODIUM 5000 UNIT(S): 5000 INJECTION INTRAVENOUS; SUBCUTANEOUS at 18:59

## 2017-03-22 RX ADMIN — Medication 3 MILLILITER(S): at 00:51

## 2017-03-22 RX ADMIN — Medication 3 MILLILITER(S): at 12:19

## 2017-03-22 RX ADMIN — HEPARIN SODIUM 5000 UNIT(S): 5000 INJECTION INTRAVENOUS; SUBCUTANEOUS at 05:45

## 2017-03-22 RX ADMIN — Medication 3 MILLILITER(S): at 05:38

## 2017-03-22 RX ADMIN — Medication 3 MILLILITER(S): at 18:18

## 2017-03-22 RX ADMIN — AZITHROMYCIN 255 MILLIGRAM(S): 500 TABLET, FILM COATED ORAL at 13:09

## 2017-03-22 RX ADMIN — CEFTRIAXONE 100 GRAM(S): 500 INJECTION, POWDER, FOR SOLUTION INTRAMUSCULAR; INTRAVENOUS at 13:09

## 2017-03-22 NOTE — DIETITIAN INITIAL EVALUATION ADULT. - PROBLEM SELECTOR PLAN 1
1. admit to ICU under Dr. Delvin mercer/ [facility].  2. repeat CXR and ABG, make bipap changes accordingly  3. duonebs q 6 hrs  4. consider intubation if condition worsens

## 2017-03-22 NOTE — DIETITIAN INITIAL EVALUATION ADULT. - NS AS NUTRI INTERV COLLABORAT
Pending swallow evaluation to assess ability to consume po & consider MD order 3 day calorie count/Collaboration with other nutrition professionals Pending swallow evaluation to assess ability to consume po & consider MD order 3 day calorie count & accuchecks due to hypoglycemia 3/21/Collaboration with other nutrition professionals

## 2017-03-22 NOTE — CONSULT NOTE ADULT - SUBJECTIVE AND OBJECTIVE BOX
HPI:  HPI:  89 Y/O female PMHx of Dementia, PE, chair bound at home with 24 hour aide BIBEMS for decreasing O2 saturation going from 2LNC to 4LNC at home. While in ED, pt was documented has having increasing CO2 levels while on BIPAP. ICU called for further evaluation. (20 Mar 2017 05:47)      Chief Complaint:  Patient is a 89y old  Female who presents with a chief complaint of increasing O2 demands (20 Mar 2017 05:47)              Social History/Family History  SOCHX:   tobacco,  -  alcohol    FMHX: FA/MO  - contributory       Discussed with:  PMD, Family    Physical Exam:    Vital Signs:  Vital Signs Last 24 Hrs  T(C): 36.7, Max: 37.3 ( @ 06:00)  T(F): 98.1, Max: 99.1 ( @ 06:00)  HR: 83 (38 - 100)  BP: 137/92 (110/48 - 218/203)  BP(mean): 108 (65 - 210)  RR: 18 (14 - 25)  SpO2: 100% (76% - 969%)  Daily     Daily Weight in k.7 (22 Mar 2017 11:11)  I&O's Summary  I & Os for 24h ending 22 Mar 2017 07:00  =============================================  IN: 300 ml / OUT: 0 ml / NET: 300 ml    I & Os for current day (as of 22 Mar 2017 23:10)  =============================================  IN: 540 ml / OUT: 0 ml / NET: 540 ml      Chest:  Full & symmetric excursion, no increased effort, breath sounds clear  Cardiovascular:bradycardia rhythm, S1, S2, no murmur/rub/S3/S4, no carotid/femoral/abdominal bruit, radial/pedal pulses 2+, no edema  Abdomen:  Soft, non-tender, non-distended, normoactive bowel sounds, no HSM      Laboratory:                          10.1   6.8   )-----------( 250      ( 22 Mar 2017 03:44 )             31.5     22 Mar 2017 03:44    140    |  100    |  12     ----------------------------<  71     3.7     |  27     |  0.62     Ca    8.6        22 Mar 2017 03:44  Phos  3.4       22 Mar 2017 03:44  Mg     2.1       22 Mar 2017 03:44            CAPILLARY BLOOD GLUCOSE                Assessment:  AV Block  ICU care  Baseline dementia  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   Seroquel may potentiate AV block, necessary and long term treatment in this patient, SBP remains stable, asymptomatic. Will continue observe tele for now.

## 2017-03-22 NOTE — PROGRESS NOTE ADULT - SUBJECTIVE AND OBJECTIVE BOX
Patient is a 89y old  Female who presents with a chief complaint of increasing O2 demands (20 Mar 2017 05:47)      INTERVAL HPI/OVERNIGHT EVENTS: pt is doing better    MEDICATIONS  (STANDING):  heparin  Injectable 5000Unit(s) SubCutaneous every 12 hours  ALBUTerol/ipratropium for Nebulization 3milliLiter(s) Nebulizer every 6 hours  amLODIPine   Tablet 2.5milliGRAM(s) Oral daily  pantoprazole    Tablet 40milliGRAM(s) Oral before breakfast  cefTRIAXone   IVPB  IV Intermittent   azithromycin  IVPB  IV Intermittent   azithromycin  IVPB 500milliGRAM(s) IV Intermittent every 24 hours  cefTRIAXone   IVPB 1Gram(s) IV Intermittent every 24 hours    MEDICATIONS  (PRN):      Allergies    Haldol (Unknown)    Intolerances        REVIEW OF SYSTEMS:  CONSTITUTIONAL: No fever, weight loss, or fatigue  EYES: No eye pain, visual disturbances, or discharge  ENMT:  No difficulty hearing, tinnitus, vertigo; No sinus or throat pain  NECK: No pain or stiffness  BREASTS: No pain, masses, or nipple discharge  RESPIRATORY: No cough, wheezing, chills or hemoptysis; No shortness of breath  CARDIOVASCULAR: No chest pain, palpitations, dizziness, or leg swelling  GASTROINTESTINAL: No abdominal or epigastric pain. No nausea, vomiting, or hematemesis; No diarrhea or constipation. No melena or hematochezia.  GENITOURINARY: No dysuria, frequency, hematuria, or incontinence  NEUROLOGICAL: No headaches, memory loss, loss of strength, numbness, or tremors  SKIN: No itching, burning, rashes, or lesions   LYMPH NODES: No enlarged glands  ENDOCRINE: No heat or cold intolerance; No hair loss  MUSCULOSKELETAL: No joint pain or swelling; No muscle, back, or extremity pain  PSYCHIATRIC: No depression, anxiety, mood swings, or difficulty sleeping  HEME/LYMPH: No easy bruising, or bleeding gums  ALLERGY AND IMMUNOLOGIC: No hives or eczema    Vital Signs Last 24 Hrs  T(C): 37.3, Max: 37.8 (- @ 19:45)  T(F): 99.1, Max: 100 (-21 @ 19:45)  HR: 77 (38 - 100)  BP: 158/76 (108/94 - 218/203)  BP(mean): 94 (81 - 210)  RR: 24 (14 - 25)  SpO2: 95% (76% - 100%)    PHYSICAL EXAM:  GENERAL: NAD, well-groomed, well-developed  HEAD:  Atraumatic, Normocephalic  EYES: EOMI, PERRLA, conjunctiva and sclera clear  ENMT: No tonsillar erythema, exudates, or enlargement; Moist mucous membranes, Good dentition, No lesions  NECK: Supple, No JVD, Normal thyroid  NERVOUS SYSTEM:  Alert & Oriented X3, Good concentration; Motor Strength 5/5 B/L upper and lower extremities; DTRs 2+ intact and symmetric  CHEST/LUNG: Clear to percussion bilaterally; No rales, rhonchi, wheezing, or rubs  HEART: Regular rate and rhythm; No murmurs, rubs, or gallops  ABDOMEN: Soft, Nontender, Nondistended; Bowel sounds present  EXTREMITIES:  2+ Peripheral Pulses, No clubbing, cyanosis, or edema  LYMPH: No lymphadenopathy noted  SKIN: No rashes or lesions    LABS:                        10.1   6.8   )-----------( 250      ( 22 Mar 2017 03:44 )             31.5     22 Mar 2017 03:44    140    |  100    |  12     ----------------------------<  71     3.7     |  27     |  0.62     Ca    8.6        22 Mar 2017 03:44  Phos  3.4       22 Mar 2017 03:44  Mg     2.1       22 Mar 2017 03:44        Urinalysis Basic - ( 20 Mar 2017 18:08 )    Color: Yellow / Appearance: very cloudy / S.015 / pH: x  Gluc: x / Ketone: Negative  / Bili: Negative / Urobili: Negative mg/dL   Blood: x / Protein: 30 mg/dL / Nitrite: Negative   Leuk Esterase: Moderate / RBC: 6-10 /HPF / WBC >50   Sq Epi: x / Non Sq Epi: Few / Bacteria: Many      CAPILLARY BLOOD GLUCOSE    CULTURES:  Culture Results:   No growth ( @ 02:18)  Culture Results:   No growth to date. ( @ 08:36)  Culture Results:   No growth to date. ( @ 00:16)    HEMOGLOBIN A1C:    CHOLESTEROL:    ABG - ( 20 Mar 2017 18:22 )  pH: x     /  pCO2: 48    /  pO2: 80    / HCO3: 32    / Base Excess: 7.5   /  SaO2: 96                  RADIOLOGY & ADDITIONAL TESTS:

## 2017-03-22 NOTE — DIETITIAN INITIAL EVALUATION ADULT. - OTHER INFO
Pt seen for ICU admission. Pt with RLL PNA & acute hypoxic/hypercarbic respiratory failure pending swallow evaluation.  Unable to obtain wt & diet hx.

## 2017-03-22 NOTE — PROGRESS NOTE ADULT - SUBJECTIVE AND OBJECTIVE BOX
INTERVAL HPI/OVERNIGHT EVENTS:  89 Y/O female PMHx, PE,HTN, and Alzheimers Dementia, chair bound at home with 24 hour aide BIBEMS for decreasing O2 saturation going from 2LNC to 4LNC at home. While in ED, pt was documented has having increasing CO2 levels while on BIPAP. Admitted to ICU with continuous BIPAP support. Being transferred to medical floor after improvement. Not able to provide information due to dementia.  Reported to have desaturation earlier, so still in ICU and  transfer changed to Telemetry.    Vital Signs Last 24 Hrs  T(C): 36.7, Max: 37.3 (03-22 @ 06:00)  T(F): 98.1, Max: 99.1 (03-22 @ 06:00)  HR: 89 (38 - 100)  BP: 137/92 (110/48 - 218/203)  BP(mean): 108 (65 - 210)  RR: 18 (14 - 25)  SpO2: 96% (76% - 969%)    PHYSICAL EXAM:  GEN:        Awake, responsive and comfortable.  HEENT:    Normal.    RESP:       crackles.  CVS:             Regular rate and rhythm.   ABD:         Soft, non-tender, non-distended;     MEDICATIONS  (STANDING):  heparin  Injectable 5000Unit(s) SubCutaneous every 12 hours  ALBUTerol/ipratropium for Nebulization 3milliLiter(s) Nebulizer every 6 hours  amLODIPine   Tablet 2.5milliGRAM(s) Oral daily  pantoprazole    Tablet 40milliGRAM(s) Oral before breakfast  cefTRIAXone   IVPB  IV Intermittent   azithromycin  IVPB  IV Intermittent   azithromycin  IVPB 500milliGRAM(s) IV Intermittent every 24 hours  cefTRIAXone   IVPB 1Gram(s) IV Intermittent every 24 hours    LABS:                        10.1   6.8   )-----------( 250      ( 22 Mar 2017 03:44 )             31.5     22 Mar 2017 03:44    140    |  100    |  12     ----------------------------<  71     3.7     |  27     |  0.62     Ca    8.6        22 Mar 2017 03:44  Phos  3.4       22 Mar 2017 03:44  Mg     2.1       22 Mar 2017 03:44    ASSESSMENT AND PLAN:  ·	Acute hypoxic/hypercarbic Respiratory failure.  ·	RLL Pneumonia.  ·	Anemia.  ·	HTN.  ·	Alzheimer Dementia.    Continue nebulize, antibiotics, O2 and PRN nebulizer.

## 2017-03-22 NOTE — DIETITIAN INITIAL EVALUATION ADULT. - DIET TYPE
DASH/TLC (sodium and cholesterol restricted diet)/phase 3 bariatric regular/dysphagia 1, pureed, honey consistency fluid/3/21/17

## 2017-03-22 NOTE — DIETITIAN INITIAL EVALUATION ADULT. - NUTRITION INTERVENTION
Medical Food Supplements/Collaboration and Referral of Nutrition Care Meals and Snack/Medical Food Supplements/Collaboration and Referral of Nutrition Care

## 2017-03-22 NOTE — CONSULT NOTE ADULT - SUBJECTIVE AND OBJECTIVE BOX
HPI:  89 Y/O female PMHx, PE,HTN, and Alzheimers Dementia, chair bound at home with 24 hour aide BIBEMS for decreasing O2 saturation going from 2LNC to 4LNC at home. While in ED, pt was documented has having increasing CO2 levels while on BIPAP. Admitted to ICU with continuous BIPAP support. Being transferred to medical floor after improvement. Not able to provide information due to dementia.    PAST MEDICAL & SURGICAL HISTORY:  UTI (urinary tract infection)  PE (pulmonary thromboembolism)  Ovarian cancer  Alzheimer&#x27;s Dementia with Behavioral Disturbance  Benign Essential Hypertension  Sigmoidoscopy Exam - Negative  S/P [facility] Biopsy of Cervix with vag laceration repair  S/P D&amp;C, operative hysteroscopy      SOCHX:   tobacco,  -  alcohol    FMHX: FA/MO  - contributory       Recent Travel:    Immunizations:    Allergies    Haldol (Unknown)    Intolerances        MEDICATIONS  (STANDING):  heparin  Injectable 5000Unit(s) SubCutaneous every 12 hours  ALBUTerol/ipratropium for Nebulization 3milliLiter(s) Nebulizer every 6 hours  amLODIPine   Tablet 2.5milliGRAM(s) Oral daily  pantoprazole    Tablet 40milliGRAM(s) Oral before breakfast  cefTRIAXone   IVPB  IV Intermittent   azithromycin  IVPB  IV Intermittent   azithromycin  IVPB 500milliGRAM(s) IV Intermittent every 24 hours  cefTRIAXone   IVPB 1Gram(s) IV Intermittent every 24 hours    MEDICATIONS  (PRN):      REVIEW OF SYSTEMS:  CONSTITUTIONAL: No fever, weight loss, or fatigue  EYES: No eye pain, visual disturbances, or discharge  ENMT:  No difficulty hearing, tinnitus, vertigo; No sinus or throat pain  NECK: No pain or stiffness  BREASTS: No pain, masses, or nipple discharge  RESPIRATORY: No cough, wheezing, chills or hemoptysis; No shortness of breath  CARDIOVASCULAR: No chest pain, palpitations, dizziness, or leg swelling  GASTROINTESTINAL: No abdominal or epigastric pain. No nausea, vomiting, or hematemesis; No diarrhea or constipation. No melena or hematochezia.  GENITOURINARY: No dysuria, frequency, hematuria, or incontinence  NEUROLOGICAL: No headaches, memory loss, loss of strength, numbness, or tremors  SKIN: No itching, burning, rashes, or lesions   LYMPH NODES: No enlarged glands  ENDOCRINE: No heat or cold intolerance; No hair loss  MUSCULOSKELETAL: No joint pain or swelling; No muscle, back, or extremity pain  PSYCHIATRIC: No depression, anxiety, mood swings, or difficulty sleeping  HEME/LYMPH: No easy bruising, or bleeding gums  ALLERGY AND IMMUNOLOGIC: No hives or eczema    VITAL SIGNS:    T(C): 37.2, Max: 37.8 ( @ 19:45)  T(F): 99, Max: 100 ( @ 19:45)  HR: 45 (38 - 100)  BP: 128/80 (128/80 - 218/203)  RR: 21 (14 - 25)  SpO2: 97% (76% - 100%)  Wt(kg): --    PHYSICAL EXAM:    GENERAL: NAD, well-groomed, well-developed  HEAD:  Atraumatic, Normocephalic  EYES: EOMI, PERRLA, conjunctiva and sclera clear  ENMT: No tonsillar erythema, exudates, or enlargement; Moist mucous membranes, Good dentition, No lesions  NECK: Supple, No JVD, Normal thyroid  NERVOUS SYSTEM:  Alert & Oriented X3, Good concentration; Motor Strength 5/5 B/L upper and lower extremities; DTRs 2+ intact and symmetric  CHEST/LUNG: Clear to percussion bilaterally; No rales, rhonchi, wheezing bilaterally  HEART: Regular rate and rhythm; No murmurs, rubs, or gallops  ABDOMEN: Soft, Nontender, Nondistended; Bowel sounds present  EXTREMITIES:  2+ Peripheral Pulses, No clubbing, cyanosis, or edema  LYMPH: No lymphadenopathy noted  SKIN: No rashes or lesions  BACK: no pressor sore     LABS:                         10.1   6.8   )-----------( 250      ( 22 Mar 2017 03:44 )             31.5     22 Mar 2017 03:44    140    |  100    |  12     ----------------------------<  71     3.7     |  27     |  0.62     Ca    8.6        22 Mar 2017 03:44  Phos  3.4       22 Mar 2017 03:44  Mg     2.1       22 Mar 2017 03:44          Urinalysis Basic - ( 20 Mar 2017 18:08 )    Color: Yellow / Appearance: very cloudy / S.015 / pH: x  Gluc: x / Ketone: Negative  / Bili: Negative / Urobili: Negative mg/dL   Blood: x / Protein: 30 mg/dL / Nitrite: Negative   Leuk Esterase: Moderate / RBC: 6-10 /HPF / WBC >50   Sq Epi: x / Non Sq Epi: Few / Bacteria: Many      ABG - ( 20 Mar 2017 18:22 )  pH: x     /  pCO2: 48    /  pO2: 80    / HCO3: 32    / Base Excess: 7.5   /  SaO2: 96                                  Culture Results:   No growth ( @ 02:18)  Culture Results:   No growth to date. ( @ 08:36)  Culture Results:   No growth to date. ( @ 00:16)                Radiology: HPI:  89 Y/O female PMHx, PE,HTN, and Alzheimers Dementia, chair bound at home with 24 hour aide BIBEMS for decreasing O2 saturation going from 2LNC to 4LNC at home. While in ED, pt was documented has having increasing CO2 levels while on BIPAP. Admitted to ICU with continuous BIPAP support. Being transferred to medical floor after improvement. Not able to provide information due to dementia.    PAST MEDICAL & SURGICAL HISTORY:  UTI (urinary tract infection)  PE (pulmonary thromboembolism)  Ovarian cancer  Alzheimer&#x27;s Dementia with Behavioral Disturbance  Benign Essential Hypertension  Sigmoidoscopy Exam - Negative  S/P [facility] Biopsy of Cervix with vag laceration repair  S/P D&amp;C, operative hysteroscopy      SOCHX:  unable to obtain    tobacco,  -  alcohol    FMHX: FA/MO  non- contributory       Recent Travel:    Immunizations:    Allergies    Haldol (Unknown)    Intolerances        MEDICATIONS  (STANDING):  heparin  Injectable 5000Unit(s) SubCutaneous every 12 hours  ALBUTerol/ipratropium for Nebulization 3milliLiter(s) Nebulizer every 6 hours  amLODIPine   Tablet 2.5milliGRAM(s) Oral daily  pantoprazole    Tablet 40milliGRAM(s) Oral before breakfast  cefTRIAXone   IVPB  IV Intermittent   azithromycin  IVPB  IV Intermittent   azithromycin  IVPB 500milliGRAM(s) IV Intermittent every 24 hours  cefTRIAXone   IVPB 1Gram(s) IV Intermittent every 24 hours    MEDICATIONS  (PRN):      REVIEW OF SYSTEMS:    unable to obtain   awaiting feest   sleeping  babbling unintelligibly     HEME/LYMPH: No easy bruising, or bleeding gums  ALLERGY AND IMMUNOLOGIC: No hives or eczema    VITAL SIGNS:    T(C): 37.2, Max: 37.8 (- @ 19:45)  T(F): 99, Max: 100 (03-21 @ 19:45)  HR: 45 (38 - 100)  BP: 128/80 (128/80 - 218/203)  RR: 21 (14 - 25)  SpO2: 97% (76% - 100%)  Wt(kg): --    PHYSICAL EXAM:    GENERAL: NAD, well-groomed, well-developed  babbling non stop  HEAD:  Atraumatic, Normocephalic  EYES: EOMI, PERRLA, conjunctiva and sclera clear  ENMT: No tonsillar erythema, exudates, or enlargement; Moist mucous membranes, Good dentition, No lesions  NECK: Supple, No JVD, Normal thyroid  NERVOUS SYSTEM: lethargic arousable with difficulty in nad   confusedr   CHEST/LUNG: Clear to percussion bilaterally; No rales, rhonchi, wheezing bilaterally  HEART: Regular rate and rhythm; No murmurs, rubs, or gallops  ABDOMEN: Soft, Nontender, Nondistended; Bowel sounds present  EXTREMITIES:  2+ Peripheral Pulses, No clubbing, cyanosis, or edema  LYMPH: No lymphadenopathy noted  SKIN: No rashes or lesions  BACK: no pressor sore   incontinent   LABS:                         10.1   6.8   )-----------( 250      ( 22 Mar 2017 03:44 )             31.5     22 Mar 2017 03:44    140    |  100    |  12     ----------------------------<  71     3.7     |  27     |  0.62     Ca    8.6        22 Mar 2017 03:44  Phos  3.4       22 Mar 2017 03:44  Mg     2.1       22 Mar 2017 03:44          Urinalysis Basic - ( 20 Mar 2017 18:08 )    Color: Yellow / Appearance: very cloudy / S.015 / pH: x  Gluc: x / Ketone: Negative  / Bili: Negative / Urobili: Negative mg/dL   Blood: x / Protein: 30 mg/dL / Nitrite: Negative   Leuk Esterase: Moderate / RBC: 6-10 /HPF / WBC >50   Sq Epi: x / Non Sq Epi: Few / Bacteria: Many      ABG - ( 20 Mar 2017 18:22 )  pH: x     /  pCO2: 48    /  pO2: 80    / HCO3: 32    / Base Excess: 7.5   /  SaO2: 96                                  Culture Results:   No growth ( @ 02:18)  Culture Results:   No growth to date. ( @ 08:36)  Culture Results:   No growth to date. ( @ 00:16)                Radiology:    MPRESSION:    No lung consolidations.  Small left pleural effusion.

## 2017-03-22 NOTE — PROGRESS NOTE ADULT - SUBJECTIVE AND OBJECTIVE BOX
HPI:  Pt is 89 yo F with h/o HTN, PE, on home O2?, dementia and chair bound at home with 24 hour aide BIBEMS for decreasing O2 saturation going from 2LNC to 4LNC at home. In the ER pt was found to be mildly hypercapneic and placed on BiPAP. Pt worked up with CTA and found to have possible R LL PNA.  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 is a COPDer, has (+) smoking hx)/ 2) R LL PNA    24 hr events: Pt noted occas to be in Mobitz I and II with stable BP    ## Labs:  CBC:                        10.1   6.8   )-----------( 250      ( 22 Mar 2017 03:44 )             31.5     Chem:  22 Mar 2017 03:44    140    |  100    |  12     ----------------------------<  71     3.7     |  27     |  0.62     Ca    8.6        22 Mar 2017 03:44  Phos  3.4       22 Mar 2017 03:44  Mg     2.1       22 Mar 2017 03:44    Coags:    ## Medications:  cefTRIAXone   IVPB  IV Intermittent   azithromycin  IVPB  IV Intermittent   azithromycin  IVPB 500milliGRAM(s) IV Intermittent every 24 hours  cefTRIAXone   IVPB 1Gram(s) IV Intermittent every 24 hours    amLODIPine   Tablet 2.5milliGRAM(s) Oral daily    ALBUTerol/ipratropium for Nebulization 3milliLiter(s) Nebulizer every 6 hours      heparin  Injectable 5000Unit(s) SubCutaneous every 12 hours    pantoprazole    Tablet 40milliGRAM(s) Oral before breakfast    ## Vitals:  T(C): 37.3, Max: 37.8 (03-21 @ 19:45)  HR: 45 (38 - 100)  BP: 128/80 (128/80 - 218/203)  BP(mean): 96 (81 - 210)  RR: 21 (14 - 25)  SpO2: 97% (76% - 100%)  Wt(kg): --  Vent:   ABG: ABG - ( 20 Mar 2017 18:22 )  pH: x     /  pCO2: 48    /  pO2: 80    / HCO3: 32    / Base Excess: 7.5   /  SaO2: 96        I & Os for current day (as of 03-22 @ 16:03)  =============================================  IN: 300 ml / OUT: 0 ml / NET: 300 ml    ## P/E:  Gen: lying comfortably in bed in no apparent distress  Lungs: CTA  Heart: RRR  Abd: Soft/+BS  Ext: No edema  Neuro: Awake but nonverbal and not interactive    CENTRAL LINE: [ ] YES [x ] NO  LOCATION:   DATE INSERTED:  REMOVE: [ ] YES [ ] NO      GARCIA: [ ] YES [ x] NO    DATE INSERTED:  REMOVE:  [ ] YES [ ] NO      A-LINE:  [ ] YES [x ] NO  LOCATION:   DATE INSERTED:  REMOVE:  [ ] YES [ ] NO  EXPLAIN:    CODE STATUS: [ x] full code  [ ] DNR  [ ] DNI  [ ] MOLST  Goals of care discussion: [x ] yes

## 2017-03-22 NOTE — DIETITIAN INITIAL EVALUATION ADULT. - NS AS NUTRI INTERV MEALS SNACK
Texture-modified diet/Mineral - modified diet/Recommend Low Na diet & continue Puree/Nectar thick liquids

## 2017-03-22 NOTE — DIETITIAN INITIAL EVALUATION ADULT. - PERTINENT LABORATORY DATA
03-22 Na 140 mmol/L Glu 71 mg/dL K+ 3.7 mmol/L Cr  0.62 mg/dL BUN 12 mg/dL Phos 3.4 mg/dL Alb 2.6(3/20)   PAB n/a   Hgb 10.1 g/dL<L> Hct 31.5 %<L>

## 2017-03-22 NOTE — CONSULT NOTE ADULT - ASSESSMENT
IPt is 89 yo F with h/o HTN, PE, on home O2?, dementia and chair bound at home with 24 hour aide BIBEMS for decreasing O2 saturation going from 2LNC to 4LNC at home. In the ER pt was found to be mildly hypercapneic and placed on BiPAP. Pt worked up with CTA and found to have possible R LL PNA.  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

## 2017-03-22 NOTE — GOALS OF CARE CONVERSATION - PERSONAL ADVANCE DIRECTIVE - CONVERSATION DETAILS
Discussed diagnoses and labs.  At this time, if pt needed intubation and CPR, pt's family would like to us to do all resuscitative effforts.  The pt was intubated in the past and recovered.  They are not ready to make pt DNR/DNI.  If it appeared that pt would somehow require long term intubation, they may reconsider their options later.

## 2017-03-23 DIAGNOSIS — R00.1 BRADYCARDIA, UNSPECIFIED: ICD-10-CM

## 2017-03-23 LAB
ANION GAP SERPL CALC-SCNC: 7 MMOL/L — SIGNIFICANT CHANGE UP (ref 5–17)
BUN SERPL-MCNC: 11 MG/DL — SIGNIFICANT CHANGE UP (ref 7–23)
CALCIUM SERPL-MCNC: 8.9 MG/DL — SIGNIFICANT CHANGE UP (ref 8.5–10.1)
CHLORIDE SERPL-SCNC: 100 MMOL/L — SIGNIFICANT CHANGE UP (ref 96–108)
CO2 SERPL-SCNC: 35 MMOL/L — HIGH (ref 22–31)
CREAT SERPL-MCNC: 0.68 MG/DL — SIGNIFICANT CHANGE UP (ref 0.5–1.3)
GLUCOSE SERPL-MCNC: 72 MG/DL — SIGNIFICANT CHANGE UP (ref 70–99)
HCT VFR BLD CALC: 34.1 % — LOW (ref 34.5–45)
HGB BLD-MCNC: 10.6 G/DL — LOW (ref 11.5–15.5)
MAGNESIUM SERPL-MCNC: 2.1 MG/DL — SIGNIFICANT CHANGE UP (ref 1.8–2.4)
MCHC RBC-ENTMCNC: 27 PG — SIGNIFICANT CHANGE UP (ref 27–34)
MCHC RBC-ENTMCNC: 31.2 GM/DL — LOW (ref 32–36)
MCV RBC AUTO: 86.8 FL — SIGNIFICANT CHANGE UP (ref 80–100)
PHOSPHATE SERPL-MCNC: 3.2 MG/DL — SIGNIFICANT CHANGE UP (ref 2.5–4.5)
PLATELET # BLD AUTO: 255 K/UL — SIGNIFICANT CHANGE UP (ref 150–400)
POTASSIUM SERPL-MCNC: 3.8 MMOL/L — SIGNIFICANT CHANGE UP (ref 3.5–5.3)
POTASSIUM SERPL-SCNC: 3.8 MMOL/L — SIGNIFICANT CHANGE UP (ref 3.5–5.3)
RBC # BLD: 3.93 M/UL — SIGNIFICANT CHANGE UP (ref 3.8–5.2)
RBC # FLD: 19.8 % — HIGH (ref 11–15)
SODIUM SERPL-SCNC: 142 MMOL/L — SIGNIFICANT CHANGE UP (ref 135–145)
WBC # BLD: 4.8 K/UL — SIGNIFICANT CHANGE UP (ref 3.8–10.5)
WBC # FLD AUTO: 4.8 K/UL — SIGNIFICANT CHANGE UP (ref 3.8–10.5)

## 2017-03-23 RX ADMIN — HEPARIN SODIUM 5000 UNIT(S): 5000 INJECTION INTRAVENOUS; SUBCUTANEOUS at 06:23

## 2017-03-23 RX ADMIN — Medication 3 MILLILITER(S): at 11:47

## 2017-03-23 RX ADMIN — Medication 3 MILLILITER(S): at 17:09

## 2017-03-23 RX ADMIN — Medication 3 MILLILITER(S): at 00:26

## 2017-03-23 RX ADMIN — HEPARIN SODIUM 5000 UNIT(S): 5000 INJECTION INTRAVENOUS; SUBCUTANEOUS at 17:37

## 2017-03-23 RX ADMIN — Medication 3 MILLILITER(S): at 06:17

## 2017-03-23 RX ADMIN — QUETIAPINE FUMARATE 50 MILLIGRAM(S): 200 TABLET, FILM COATED ORAL at 00:16

## 2017-03-23 RX ADMIN — CEFTRIAXONE 100 GRAM(S): 500 INJECTION, POWDER, FOR SOLUTION INTRAMUSCULAR; INTRAVENOUS at 11:39

## 2017-03-23 RX ADMIN — AMLODIPINE BESYLATE 2.5 MILLIGRAM(S): 2.5 TABLET ORAL at 06:23

## 2017-03-23 RX ADMIN — AZITHROMYCIN 255 MILLIGRAM(S): 500 TABLET, FILM COATED ORAL at 11:40

## 2017-03-23 RX ADMIN — PANTOPRAZOLE SODIUM 40 MILLIGRAM(S): 20 TABLET, DELAYED RELEASE ORAL at 06:23

## 2017-03-23 NOTE — SWALLOW BEDSIDE ASSESSMENT ADULT - SWALLOW EVAL: RECOMMENDED FEEDING/EATING TECHNIQUES
allow for swallow between intakes/maintain upright posture during/after eating for 30 mins/position upright (90 degrees)/small sips/bites

## 2017-03-23 NOTE — PROGRESS NOTE ADULT - SUBJECTIVE AND OBJECTIVE BOX
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   Seroquel may potentiate AV block, necessary and long term treatment in this patient, SBP remains stable, asymptomatic. Will continue observe tele for now.   Will hold Seroquel for a day or two, before starting 25mg daily to see response in HR

## 2017-03-23 NOTE — PROGRESS NOTE ADULT - SUBJECTIVE AND OBJECTIVE BOX
Patient is a 89y old  Female who presents with a chief complaint of increasing O2 demands (20 Mar 2017 05:47)      INTERVAL HPI/OVERNIGHT EVENTS: no acute distress  MEDICATIONS  (STANDING):  heparin  Injectable 5000Unit(s) SubCutaneous every 12 hours  ALBUTerol/ipratropium for Nebulization 3milliLiter(s) Nebulizer every 6 hours  amLODIPine   Tablet 2.5milliGRAM(s) Oral daily  pantoprazole    Tablet 40milliGRAM(s) Oral before breakfast  cefTRIAXone   IVPB  IV Intermittent   azithromycin  IVPB  IV Intermittent   azithromycin  IVPB 500milliGRAM(s) IV Intermittent every 24 hours  cefTRIAXone   IVPB 1Gram(s) IV Intermittent every 24 hours    MEDICATIONS  (PRN):      Allergies    Haldol (Unknown)    Intolerances        REVIEW OF SYSTEMS:  CONSTITUTIONAL: No fever, weight loss, or fatigue  EYES: No eye pain, visual disturbances, or discharge  ENMT:  No difficulty hearing, tinnitus, vertigo; No sinus or throat pain  NECK: No pain or stiffness  BREASTS: No pain, masses, or nipple discharge  RESPIRATORY: No cough, wheezing, chills or hemoptysis; No shortness of breath  CARDIOVASCULAR: No chest pain, palpitations, dizziness, or leg swelling  GASTROINTESTINAL: No abdominal or epigastric pain. No nausea, vomiting, or hematemesis; No diarrhea or constipation. No melena or hematochezia.  GENITOURINARY: No dysuria, frequency, hematuria, or incontinence  NEUROLOGICAL: No headaches, memory loss, loss of strength, numbness, or tremors  SKIN: No itching, burning, rashes, or lesions   LYMPH NODES: No enlarged glands  ENDOCRINE: No heat or cold intolerance; No hair loss  MUSCULOSKELETAL: No joint pain or swelling; No muscle, back, or extremity pain  PSYCHIATRIC: No depression, anxiety, mood swings, or difficulty sleeping  HEME/LYMPH: No easy bruising, or bleeding gums  ALLERGY AND IMMUNOLOGIC: No hives or eczema    Vital Signs Last 24 Hrs  T(C): 36.4, Max: 37.3 (03-23 @ 01:02)  T(F): 97.6, Max: 99.1 (03-23 @ 01:02)  HR: 50 (45 - 116)  BP: 143/90 (105/72 - 164/71)  BP(mean): 108 (85 - 135)  RR: 16 (16 - 25)  SpO2: 96% (90% - 969%)    PHYSICAL EXAM:  GENERAL: NAD, well-groomed, well-developed  HEAD:  Atraumatic, Normocephalic  EYES: EOMI, PERRLA, conjunctiva and sclera clear  ENMT: No tonsillar erythema, exudates, or enlargement; Moist mucous membranes, Good dentition, No lesions  NECK: Supple, No JVD, Normal thyroid  NERVOUS SYSTEM:  Alert & Oriented X3, Good concentration; Motor Strength 5/5 B/L upper and lower extremities; DTRs 2+ intact and symmetric  CHEST/LUNG: Clear to percussion bilaterally; No rales, rhonchi, wheezing, or rubs  HEART: Regular rate and rhythm; No murmurs, rubs, or gallops  ABDOMEN: Soft, Nontender, Nondistended; Bowel sounds present  EXTREMITIES:  2+ Peripheral Pulses, No clubbing, cyanosis, or edema  LYMPH: No lymphadenopathy noted  SKIN: No rashes or lesions    LABS:                        10.6   4.8   )-----------( 255      ( 23 Mar 2017 07:54 )             34.1     23 Mar 2017 07:54    142    |  100    |  11     ----------------------------<  72     3.8     |  35     |  0.68     Ca    8.9        23 Mar 2017 07:54  Phos  3.2       23 Mar 2017 07:54  Mg     2.1       23 Mar 2017 07:54          CAPILLARY BLOOD GLUCOSE    CULTURES:  Culture Results:   No growth (03-21 @ 02:18)    HEMOGLOBIN A1C:    CHOLESTEROL:        RADIOLOGY & ADDITIONAL TESTS:

## 2017-03-23 NOTE — SWALLOW BEDSIDE ASSESSMENT ADULT - COMMENTS
CXR 3/20/2017 Impression: No lung consolidations. Small left pleural effusion.    CT Angio Chest 3/20/2017 Impression: No pulmonary embolism. Chronic lung disease as described. Bibasilar subsegmental atelectasis. Superimposed right lower lobe pneumonia cannot be excluded.

## 2017-03-23 NOTE — SWALLOW BEDSIDE ASSESSMENT ADULT - SWALLOW EVAL: DIAGNOSIS
Pt is a 89 y o female admitted with pneumonia due to infectious organism and dementia presented with oropharyngeal phases of the swallow marked by reduced cognition, decreased mastication with soft solid, reduced bolus formation resulting in moderate lingual residue, suspect delay in swallow trigger, decreased laryngeal elevation, and cough and multiple swallows post oral intake with thin liquid and soft solid. Pt is a 89 y o female admitted with pneumonia due to infectious organism and dementia presented with oropharyngeal phases of the swallow marked by reduced cognition, decreased mastication with soft solid, reduced bolus formation resulting in moderate lingual residue, suspect delay in swallow trigger, decreased laryngeal elevation, and noted cough with multiple swallows post oral intake for thin liquid and soft solid.

## 2017-03-23 NOTE — SWALLOW BEDSIDE ASSESSMENT ADULT - SLP PERTINENT HISTORY OF CURRENT PROBLEM
89 Y/O female PMHx of Dementia, PE, chair bound at home with 24 hour aide BIBEMS for decreasing O2 saturation going from 2LNC to 4LNC at home. While in ED, pt was documented has having increasing CO2 levels while on BIPAP. ICU called for further evaluation.

## 2017-03-23 NOTE — SWALLOW BEDSIDE ASSESSMENT ADULT - PHARYNGEAL PHASE
Decreased laryngeal elevation/Delayed pharyngeal swallow Delayed pharyngeal swallow/Decreased laryngeal elevation Multiple swallows/Decreased laryngeal elevation/Cough post oral intake/Delayed pharyngeal swallow Delayed pharyngeal swallow/Cough post oral intake/Multiple swallows/Decreased laryngeal elevation

## 2017-03-23 NOTE — PROGRESS NOTE ADULT - SUBJECTIVE AND OBJECTIVE BOX
INTERVAL HPI/OVERNIGHT EVENTS:  89 Y/O female PMHx, PE,HTN, and Alzheimers Dementia, chair bound at home with 24 hour aide BIBEMS for decreasing O2 saturation going from 2LNC to 4LNC at home. While in ED, pt was documented has having increasing CO2 levels while on BIPAP. Admitted to ICU with continuous BIPAP support, transferred to telemetry 03/22/17.  Awake and responsive but non communicative because of dementia.     Vital Signs Last 24 Hrs  T(C): 36.9, Max: 37.3 (03-23 @ 01:02)  T(F): 98.4, Max: 99.1 (03-23 @ 01:02)  HR: 72 (45 - 116)  BP: 151/90 (105/72 - 164/71)  BP(mean): 108 (65 - 135)  RR: 19 (14 - 25)  SpO2: 99% (90% - 969%)    PHYSICAL EXAM:  GEN:        Awake, responsive and comfortable.  HEENT:    Normal.    RESP:      crackles.  CVS:          Regular rate and rhythm.   ABD:         Soft, non-tender, non-distended;   EXTR:         edema    MEDICATIONS  (STANDING):  heparin  Injectable 5000Unit(s) SubCutaneous every 12 hours  ALBUTerol/ipratropium for Nebulization 3milliLiter(s) Nebulizer every 6 hours  amLODIPine   Tablet 2.5milliGRAM(s) Oral daily  pantoprazole    Tablet 40milliGRAM(s) Oral before breakfast  cefTRIAXone   IVPB  IV Intermittent   azithromycin  IVPB  IV Intermittent   azithromycin  IVPB 500milliGRAM(s) IV Intermittent every 24 hours  cefTRIAXone   IVPB 1Gram(s) IV Intermittent every 24 hours  QUEtiapine 50milliGRAM(s) Oral at bedtime    LABS:                        10.6   4.8   )-----------( 255      ( 23 Mar 2017 07:54 )             34.1     23 Mar 2017 07:54    142    |  100    |  11     ----------------------------<  72     3.8     |  35     |  0.68     Ca    8.9        23 Mar 2017 07:54  Phos  3.2       23 Mar 2017 07:54  Mg     2.1       23 Mar 2017 07:54    ASSESSMENT AND PLAN:  ·	Acute hypoxic/hypercarbic Respiratory failure.  ·	RLL Pneumonia.  ·	Anemia.  ·	HTN.  ·	Alzheimer Dementia.    On antibiotics and nebulizer.  Supplemental O2 with PRN BIPAP.  Puree with Nector thick recommended by Speech pathology.

## 2017-03-23 NOTE — SWALLOW BEDSIDE ASSESSMENT ADULT - SLP GENERAL OBSERVATIONS
Pt was seen bedside alert and minimally responsive to tactile/verbal stimuli. Pt was nonverbal, essentially non-communicative except facial grimace. Pt did not follow 1-step directions.

## 2017-03-23 NOTE — PROGRESS NOTE ADULT - SUBJECTIVE AND OBJECTIVE BOX
HPI:  89 Y/O female PMHx of Dementia, PE, chair bound at home with 24 hour aide BIBEMS for decreasing O2 saturation going from 2LNC to 4LNC at home. While in ED, pt was documented has having increasing CO2 levels while on BIPAP. ICU called for further evaluation. (20 Mar 2017 05:47)      Allergies    Haldol (Unknown)    Intolerances        MEDICATIONS  (STANDING):  heparin  Injectable 5000Unit(s) SubCutaneous every 12 hours  ALBUTerol/ipratropium for Nebulization 3milliLiter(s) Nebulizer every 6 hours  amLODIPine   Tablet 2.5milliGRAM(s) Oral daily  pantoprazole    Tablet 40milliGRAM(s) Oral before breakfast  cefTRIAXone   IVPB  IV Intermittent   azithromycin  IVPB  IV Intermittent   azithromycin  IVPB 500milliGRAM(s) IV Intermittent every 24 hours  cefTRIAXone   IVPB 1Gram(s) IV Intermittent every 24 hours    MEDICATIONS  (PRN):      REVIEW OF SYSTEMS:    CONSTITUTIONAL: No fever, chills, weight loss, or fatigue  HEENT: No sore throat, runny nose, ear ache  RESPIRATORY: No cough, wheezing, No shortness of breath  CARDIOVASCULAR: No chest pain, palpitations, dizziness  GASTROINTESTINAL: No abdominal pain. No nausea, vomiting, diarrhea  GENITOURINARY: No dysuria, increase frequency, hematuria, or incontinence  NEUROLOGICAL: No headaches, memory loss, loss of strength, numbness, or tremors, no weakness  EXTREMITY: No pedal edema BLE  SKIN: No itching, burning, rashes, or lesions     VITAL SIGNS:  T(C): 36.7, Max: 37.3 (03-23 @ 01:02)  T(F): 98, Max: 99.1 (03-23 @ 01:02)  HR: 72 (50 - 116)  BP: 130/55 (105/72 - 151/90)  RR: 18 (16 - 19)  SpO2: 95% (95% - 100%)  Wt(kg): --    PHYSICAL EXAM:    GENERAL: not in any distress  HEENT: Neck is supple, normocephalic, atraumatic   CHEST/LUNG: Clear to percussion bilaterally; No rales, rhonchi, wheezing  HEART: Regular rate and rhythm; No murmurs, rubs, or gallops  ABDOMEN: Soft, Nontender, Nondistended; Bowel sounds present, no rebound   EXTREMITIES:  2+ Peripheral Pulses, No clubbing, cyanosis, or edema  GENITOURINARY:   SKIN: No rashes or lesions  BACK: no pressor sore   NERVOUS SYSTEM:  Alert & Oriented X3, Good concentration  PSYCH: normal affect     LABS:                         10.6   4.8   )-----------( 255      ( 23 Mar 2017 07:54 )             34.1     23 Mar 2017 07:54    142    |  100    |  11     ----------------------------<  72     3.8     |  35     |  0.68     Ca    8.9        23 Mar 2017 07:54  Phos  3.2       23 Mar 2017 07:54  Mg     2.1       23 Mar 2017 07:54                                Culture Results:   No growth (03-21 @ 02:18)  Culture Results:   No growth to date. (03-20 @ 08:36)  Culture Results:   No growth to date. (03-20 @ 00:16)                Radiology:      IMPRESSION:    No lung consolidations.  Small left pleural effusion. HPI:  89 Y/O female PMHx of Dementia, PE, chair bound at home with 24 hour aide BIBEMS for decreasing O2 saturation going from 2LNC to 4LNC at home. While in ED, pt was documented has having increasing CO2 levels while on BIPAP.   initially in icu ; now on a monitor       Allergies    Haldol (Unknown)    Intolerances        MEDICATIONS  (STANDING):  heparin  Injectable 5000Unit(s) SubCutaneous every 12 hours  ALBUTerol/ipratropium for Nebulization 3milliLiter(s) Nebulizer every 6 hours  amLODIPine   Tablet 2.5milliGRAM(s) Oral daily  pantoprazole    Tablet 40milliGRAM(s) Oral before breakfast  cefTRIAXone   IVPB  IV Intermittent   azithromycin  IVPB  IV Intermittent   azithromycin  IVPB 500milliGRAM(s) IV Intermittent every 24 hours  cefTRIAXone   IVPB 1Gram(s) IV Intermittent every 24 hours    MEDICATIONS  (PRN):      REVIEW OF SYSTEMS:  more arousable   no pain   non verbal   unable to assess       VITAL SIGNS:  T(C): 36.7, Max: 37.3 (03-23 @ 01:02)  T(F): 98, Max: 99.1 (03-23 @ 01:02)  HR: 72 (50 - 116)  BP: 130/55 (105/72 - 151/90)  RR: 18 (16 - 19)  SpO2: 95% (95% - 100%)  Wt(kg): --    PHYSICAL EXAM:    GENERAL: not in any distress  arousable confused  HEENT: Neck is supple, normocephalic, atraumatic   CHEST/LUNG: Clear to percussion bilaterally; No rales, rhonchi, wheezing  HEART: Regular rate and rhythm; No murmurs, rubs, or gallops  ABDOMEN: Soft, Nontender, Nondistended; Bowel sounds present, no rebound   EXTREMITIES:  2+ Peripheral Pulses, No clubbing, cyanosis, or edema  GENITOURINARY: NEGATIVE   SKIN: No rashes or lesions  BACK: no pressor sore   NERVOUS SYSTEM:  Arousable   PSYCH: unable to assess   LABS:                         10.6   4.8   )-----------( 255      ( 23 Mar 2017 07:54 )             34.1     23 Mar 2017 07:54    142    |  100    |  11     ----------------------------<  72     3.8     |  35     |  0.68     Ca    8.9        23 Mar 2017 07:54  Phos  3.2       23 Mar 2017 07:54  Mg     2.1       23 Mar 2017 07:54                                Culture Results:   No growth (03-21 @ 02:18)  Culture Results:   No growth to date. (03-20 @ 08:36)  Culture Results:   No growth to date. (03-20 @ 00:16)                Radiology:      IMPRESSION:    No lung consolidations.  Small left pleural effusion.

## 2017-03-24 DIAGNOSIS — N39.0 URINARY TRACT INFECTION, SITE NOT SPECIFIED: ICD-10-CM

## 2017-03-24 LAB
BASE EXCESS BLDA CALC-SCNC: 9 MMOL/L — HIGH (ref -2–2)
BLOOD GAS COMMENTS: SIGNIFICANT CHANGE UP
BLOOD GAS SOURCE: SIGNIFICANT CHANGE UP
HCO3 BLDA-SCNC: 34 MMOL/L — HIGH (ref 21–29)
HOROWITZ INDEX BLDA+IHG-RTO: 28 — SIGNIFICANT CHANGE UP
PCO2 BLDA: 47 MMHG — HIGH (ref 32–46)
PH BLD: 7.46 — HIGH (ref 7.35–7.45)
PO2 BLDA: 53 MMHG — LOW (ref 74–108)
SAO2 % BLDA: 85 % — LOW (ref 92–96)

## 2017-03-24 RX ORDER — CEFDINIR 250 MG/5ML
300 POWDER, FOR SUSPENSION ORAL
Qty: 0 | Refills: 0 | Status: DISCONTINUED | OUTPATIENT
Start: 2017-03-24 | End: 2017-03-29

## 2017-03-24 RX ADMIN — Medication 3 MILLILITER(S): at 00:12

## 2017-03-24 RX ADMIN — CEFDINIR 300 MILLIGRAM(S): 250 POWDER, FOR SUSPENSION ORAL at 18:52

## 2017-03-24 RX ADMIN — CEFTRIAXONE 100 GRAM(S): 500 INJECTION, POWDER, FOR SOLUTION INTRAMUSCULAR; INTRAVENOUS at 11:41

## 2017-03-24 RX ADMIN — Medication 3 MILLILITER(S): at 11:15

## 2017-03-24 RX ADMIN — AMLODIPINE BESYLATE 2.5 MILLIGRAM(S): 2.5 TABLET ORAL at 06:23

## 2017-03-24 RX ADMIN — Medication 3 MILLILITER(S): at 17:06

## 2017-03-24 RX ADMIN — PANTOPRAZOLE SODIUM 40 MILLIGRAM(S): 20 TABLET, DELAYED RELEASE ORAL at 06:23

## 2017-03-24 RX ADMIN — Medication 3 MILLILITER(S): at 23:37

## 2017-03-24 RX ADMIN — HEPARIN SODIUM 5000 UNIT(S): 5000 INJECTION INTRAVENOUS; SUBCUTANEOUS at 06:22

## 2017-03-24 RX ADMIN — HEPARIN SODIUM 5000 UNIT(S): 5000 INJECTION INTRAVENOUS; SUBCUTANEOUS at 17:32

## 2017-03-24 RX ADMIN — Medication 3 MILLILITER(S): at 05:45

## 2017-03-24 NOTE — PROGRESS NOTE ADULT - SUBJECTIVE AND OBJECTIVE BOX
INTERVAL HPI/OVERNIGHT EVENTS:  89 Y/O female PMHx, PE,HTN, and Alzheimers Dementia, chair bound at home with 24 hour aide BIBEMS for decreasing O2 saturation going from 2LNC to 4LNC at home. While in ED, pt was documented has having increasing CO2 levels while on BIPAP. Admitted to ICU with continuous BIPAP support, transferred to telemetry 03/22/17.  Resting comfortably , arouse able.    Vital Signs Last 24 Hrs  T(C): 36.9, Max: 37.1 (03-24 @ 05:08)  T(F): 98.4, Max: 98.8 (03-24 @ 05:08)  HR: 68 (64 - 96)  BP: 118/64 (118/59 - 137/60)  BP(mean): --  RR: 17 (17 - 18)  SpO2: 96% (93% - 100%)    PHYSICAL EXAM:  GEN:        Awake, responsive and comfortable.  HEENT:    Normal.    RESP:      no wheezing.  CVS:          Regular rate and rhythm.   ABD:         Soft, non-tender, non-distended;     MEDICATIONS  (STANDING):  heparin  Injectable 5000Unit(s) SubCutaneous every 12 hours  ALBUTerol/ipratropium for Nebulization 3milliLiter(s) Nebulizer every 6 hours  amLODIPine   Tablet 2.5milliGRAM(s) Oral daily  pantoprazole    Tablet 40milliGRAM(s) Oral before breakfast  cefdinir 300milliGRAM(s) Oral two times a day    LABS:                        10.6   4.8   )-----------( 255      ( 23 Mar 2017 07:54 )             34.1     23 Mar 2017 07:54    142    |  100    |  11     ----------------------------<  72     3.8     |  35     |  0.68     Ca    8.9        23 Mar 2017 07:54  Phos  3.2       23 Mar 2017 07:54  Mg     2.1       23 Mar 2017 07:54    ASSESSMENT AND PLAN:  ·	Acute hypoxic/hypercarbic Respiratory failure.  ·	RLL Pneumonia.  ·	Anemia.  ·	HTN.  ·	Alzheimer Dementia.    Continue supportive treatment.  Will obtain ABG to assess base line oxygenation and acid base status.

## 2017-03-24 NOTE — PROGRESS NOTE ADULT - SUBJECTIVE AND OBJECTIVE BOX
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   Seroquel may potentiate AV block, necessary and long term treatment in this patient, SBP remains stable, asymptomatic. Will continue observe tele for now.   Will hold Seroquel for a day or two, before starting 25mg daily, response in HR is improved  No PPM needed. May DC plan. Start Seroquel as out patient slowly as needed

## 2017-03-24 NOTE — PROGRESS NOTE ADULT - SUBJECTIVE AND OBJECTIVE BOX
Patient is a 89y old  Female who presents with a chief complaint of increasing O2 demands (20 Mar 2017 05:47)      INTERVAL HPI/OVERNIGHT EVENTS: pt is awake     MEDICATIONS  (STANDING):  heparin  Injectable 5000Unit(s) SubCutaneous every 12 hours  ALBUTerol/ipratropium for Nebulization 3milliLiter(s) Nebulizer every 6 hours  amLODIPine   Tablet 2.5milliGRAM(s) Oral daily  pantoprazole    Tablet 40milliGRAM(s) Oral before breakfast  cefTRIAXone   IVPB  IV Intermittent   azithromycin  IVPB  IV Intermittent   azithromycin  IVPB 500milliGRAM(s) IV Intermittent every 24 hours  cefTRIAXone   IVPB 1Gram(s) IV Intermittent every 24 hours    MEDICATIONS  (PRN):      Allergies    Haldol (Unknown)    Intolerances        REVIEW OF SYSTEMS:  CONSTITUTIONAL: No fever, weight loss, or fatigue  EYES: No eye pain, visual disturbances, or discharge  ENMT:  No difficulty hearing, tinnitus, vertigo; No sinus or throat pain  NECK: No pain or stiffness  BREASTS: No pain, masses, or nipple discharge  RESPIRATORY: No cough, wheezing, chills or hemoptysis; No shortness of breath  CARDIOVASCULAR: No chest pain, palpitations, dizziness, or leg swelling  GASTROINTESTINAL: No abdominal or epigastric pain. No nausea, vomiting, or hematemesis; No diarrhea or constipation. No melena or hematochezia.  GENITOURINARY: No dysuria, frequency, hematuria, or incontinence  NEUROLOGICAL: No headaches, memory loss, loss of strength, numbness, or tremors  SKIN: No itching, burning, rashes, or lesions   LYMPH NODES: No enlarged glands  ENDOCRINE: No heat or cold intolerance; No hair loss  MUSCULOSKELETAL: No joint pain or swelling; No muscle, back, or extremity pain  PSYCHIATRIC: No depression, anxiety, mood swings, or difficulty sleeping  HEME/LYMPH: No easy bruising, or bleeding gums  ALLERGY AND IMMUNOLOGIC: No hives or eczema    Vital Signs Last 24 Hrs  T(C): 36.9, Max: 37.1 (03-24 @ 05:08)  T(F): 98.4, Max: 98.8 (03-24 @ 05:08)  HR: 96 (50 - 96)  BP: 118/64 (118/59 - 137/60)  BP(mean): --  RR: 17 (17 - 18)  SpO2: 94% (93% - 100%)    PHYSICAL EXAM:  GENERAL: NAD, well-groomed, well-developed  HEAD:  Atraumatic, Normocephalic  EYES: EOMI, PERRLA, conjunctiva and sclera clear  ENMT: No tonsillar erythema, exudates, or enlargement; Moist mucous membranes, Good dentition, No lesions  NECK: Supple, No JVD, Normal thyroid  NERVOUS SYSTEM:  Alert & Oriented X3, Good concentration; Motor Strength 5/5 B/L upper and lower extremities; DTRs 2+ intact and symmetric  CHEST/LUNG: Clear to percussion bilaterally; No rales, rhonchi, wheezing, or rubs  HEART: Regular rate and rhythm; No murmurs, rubs, or gallops  ABDOMEN: Soft, Nontender, Nondistended; Bowel sounds present  EXTREMITIES:  2+ Peripheral Pulses, No clubbing, cyanosis, or edema  LYMPH: No lymphadenopathy noted  SKIN: No rashes or lesions    LABS:                        10.6   4.8   )-----------( 255      ( 23 Mar 2017 07:54 )             34.1     23 Mar 2017 07:54    142    |  100    |  11     ----------------------------<  72     3.8     |  35     |  0.68     Ca    8.9        23 Mar 2017 07:54  Phos  3.2       23 Mar 2017 07:54  Mg     2.1       23 Mar 2017 07:54          CAPILLARY BLOOD GLUCOSE    CULTURES:    HEMOGLOBIN A1C:    CHOLESTEROL:        RADIOLOGY & ADDITIONAL TESTS:

## 2017-03-24 NOTE — PROGRESS NOTE ADULT - SUBJECTIVE AND OBJECTIVE BOX
HPI:  89 Y/O female PMHx of Dementia, PE, chair bound at home with 24 hour aide BIBEMS for decreasing O2 saturation going from 2LNC to 4LNC at home. While in ED, pt was documented has having increasing CO2 levels while on BIPAP.   all events noted   Altered Mental Status and confused; no change in status ; more alert   Allergies    Haldol (Unknown)    Intolerances        MEDICATIONS  (STANDING):  heparin  Injectable 5000Unit(s) SubCutaneous every 12 hours  ALBUTerol/ipratropium for Nebulization 3milliLiter(s) Nebulizer every 6 hours  amLODIPine   Tablet 2.5milliGRAM(s) Oral daily  pantoprazole    Tablet 40milliGRAM(s) Oral before breakfast  cefTRIAXone   IVPB  IV Intermittent   azithromycin  IVPB  IV Intermittent   azithromycin  IVPB 500milliGRAM(s) IV Intermittent every 24 hours  cefTRIAXone   IVPB 1Gram(s) IV Intermittent every 24 hours    MEDICATIONS  (PRN):      REVIEW OF SYSTEMS:  unable to obtain   more alert but not making more sense  CONSTITUTIONAL: No fever, chills, weight loss, or fatigue  VITAL SIGNS:  T(C): 36.9, Max: 37.1 (03-24 @ 05:08)  T(F): 98.4, Max: 98.8 (03-24 @ 05:08)  HR: 68 (64 - 96)  BP: 118/64 (118/59 - 137/60)  RR: 17 (17 - 18)  SpO2: 96% (93% - 100%)  Wt(kg): --    PHYSICAL EXAM:    GENERAL: not in any distress  HEENT: Neck is supple, normocephalic, atraumatic   CHEST/LUNG: Clear to percussion bilaterally; No rales, rhonchi, wheezing  HEART: Regular rate and rhythm; No murmurs, rubs, or gallops  ABDOMEN: Soft, Nontender, Nondistended; Bowel sounds present, no rebound   EXTREMITIES:  2+ Peripheral Pulses, No clubbing, cyanosis, or edema  GENITOURINARY: NEGATIVE   SKIN: No rashes or lesions  BACK: no pressor sore   NERVOUS SYSTEM:  Alert confused    LABS:                         10.6   4.8   )-----------( 255      ( 23 Mar 2017 07:54 )             34.1     23 Mar 2017 07:54    142    |  100    |  11     ----------------------------<  72     3.8     |  35     |  0.68     Ca    8.9        23 Mar 2017 07:54  Phos  3.2       23 Mar 2017 07:54  Mg     2.1       23 Mar 2017 07:54                                Culture Results:   No growth (03-21 @ 02:18)  Culture Results:   No growth to date. (03-20 @ 08:36)  Culture Results:   No growth to date. (03-20 @ 00:16)                Radiology:

## 2017-03-25 LAB
CULTURE RESULTS: SIGNIFICANT CHANGE UP
CULTURE RESULTS: SIGNIFICANT CHANGE UP
SPECIMEN SOURCE: SIGNIFICANT CHANGE UP
SPECIMEN SOURCE: SIGNIFICANT CHANGE UP

## 2017-03-25 RX ORDER — QUETIAPINE FUMARATE 200 MG/1
50 TABLET, FILM COATED ORAL
Qty: 0 | Refills: 0 | Status: DISCONTINUED | OUTPATIENT
Start: 2017-03-25 | End: 2017-03-29

## 2017-03-25 RX ORDER — OLANZAPINE 15 MG/1
5 TABLET, FILM COATED ORAL ONCE
Qty: 0 | Refills: 0 | Status: COMPLETED | OUTPATIENT
Start: 2017-03-25 | End: 2017-03-25

## 2017-03-25 RX ADMIN — QUETIAPINE FUMARATE 50 MILLIGRAM(S): 200 TABLET, FILM COATED ORAL at 17:15

## 2017-03-25 RX ADMIN — HEPARIN SODIUM 5000 UNIT(S): 5000 INJECTION INTRAVENOUS; SUBCUTANEOUS at 06:30

## 2017-03-25 RX ADMIN — PANTOPRAZOLE SODIUM 40 MILLIGRAM(S): 20 TABLET, DELAYED RELEASE ORAL at 06:30

## 2017-03-25 RX ADMIN — CEFDINIR 300 MILLIGRAM(S): 250 POWDER, FOR SUSPENSION ORAL at 17:15

## 2017-03-25 RX ADMIN — Medication 3 MILLILITER(S): at 23:58

## 2017-03-25 RX ADMIN — Medication 3 MILLILITER(S): at 11:25

## 2017-03-25 RX ADMIN — HEPARIN SODIUM 5000 UNIT(S): 5000 INJECTION INTRAVENOUS; SUBCUTANEOUS at 17:15

## 2017-03-25 RX ADMIN — AMLODIPINE BESYLATE 2.5 MILLIGRAM(S): 2.5 TABLET ORAL at 06:30

## 2017-03-25 RX ADMIN — OLANZAPINE 5 MILLIGRAM(S): 15 TABLET, FILM COATED ORAL at 01:53

## 2017-03-25 RX ADMIN — Medication 3 MILLILITER(S): at 17:06

## 2017-03-25 RX ADMIN — CEFDINIR 300 MILLIGRAM(S): 250 POWDER, FOR SUSPENSION ORAL at 06:30

## 2017-03-25 RX ADMIN — Medication 3 MILLILITER(S): at 05:56

## 2017-03-25 NOTE — PROGRESS NOTE ADULT - SUBJECTIVE AND OBJECTIVE BOX
Patient is a 89y old  Female who presents with a chief complaint of increasing O2 demands (20 Mar 2017 05:47)      INTERVAL HPI/OVERNIGHT EVENTS: pt is agitated on BIPAP    MEDICATIONS  (STANDING):  heparin  Injectable 5000Unit(s) SubCutaneous every 12 hours  ALBUTerol/ipratropium for Nebulization 3milliLiter(s) Nebulizer every 6 hours  amLODIPine   Tablet 2.5milliGRAM(s) Oral daily  pantoprazole    Tablet 40milliGRAM(s) Oral before breakfast  cefdinir 300milliGRAM(s) Oral two times a day  QUEtiapine 50milliGRAM(s) Oral two times a day    MEDICATIONS  (PRN):      Allergies    Haldol (Unknown)    Intolerances        REVIEW OF SYSTEMS:  CONSTITUTIONAL: No fever, weight loss, or fatigue  EYES: No eye pain, visual disturbances, or discharge  ENMT:  No difficulty hearing, tinnitus, vertigo; No sinus or throat pain  NECK: No pain or stiffness  BREASTS: No pain, masses, or nipple discharge  RESPIRATORY: No cough, wheezing, chills or hemoptysis; No shortness of breath  CARDIOVASCULAR: No chest pain, palpitations, dizziness, or leg swelling  GASTROINTESTINAL: No abdominal or epigastric pain. No nausea, vomiting, or hematemesis; No diarrhea or constipation. No melena or hematochezia.  GENITOURINARY: No dysuria, frequency, hematuria, or incontinence  NEUROLOGICAL: No headaches, memory loss, loss of strength, numbness, or tremors  SKIN: No itching, burning, rashes, or lesions   LYMPH NODES: No enlarged glands  ENDOCRINE: No heat or cold intolerance; No hair loss  MUSCULOSKELETAL: No joint pain or swelling; No muscle, back, or extremity pain  PSYCHIATRIC: No depression, anxiety, mood swings, or difficulty sleeping  HEME/LYMPH: No easy bruising, or bleeding gums  ALLERGY AND IMMUNOLOGIC: No hives or eczema    Vital Signs Last 24 Hrs  T(C): 37, Max: 37.9 (03-24 @ 18:04)  T(F): 98.6, Max: 100.2 (03-24 @ 18:04)  HR: 87 (70 - 93)  BP: 147/78 (118/69 - 149/80)  BP(mean): --  RR: 19 (17 - 20)  SpO2: 93% (93% - 100%)    PHYSICAL EXAM:  GENERAL: NAD, well-groomed, well-developed  HEAD:  Atraumatic, Normocephalic  EYES: EOMI, PERRLA, conjunctiva and sclera clear  ENMT: No tonsillar erythema, exudates, or enlargement; Moist mucous membranes, Good dentition, No lesions  NECK: Supple, No JVD, Normal thyroid  NERVOUS SYSTEM:  Alert & Oriented X3, Good concentration; Motor Strength 5/5 B/L upper and lower extremities; DTRs 2+ intact and symmetric  CHEST/LUNG: Clear to percussion bilaterally; No rales, rhonchi, wheezing, or rubs  HEART: Regular rate and rhythm; No murmurs, rubs, or gallops  ABDOMEN: Soft, Nontender, Nondistended; Bowel sounds present  EXTREMITIES:  2+ Peripheral Pulses, No clubbing, cyanosis, or edema  LYMPH: No lymphadenopathy noted  SKIN: No rashes or lesions    LABS:              CAPILLARY BLOOD GLUCOSE    CULTURES:    HEMOGLOBIN A1C:    CHOLESTEROL:    ABG - ( 24 Mar 2017 18:46 )  pH: x     /  pCO2: 47    /  pO2: 53    / HCO3: 34    / Base Excess: 9.0   /  SaO2: 85                  RADIOLOGY & ADDITIONAL TESTS:

## 2017-03-25 NOTE — PROGRESS NOTE ADULT - SUBJECTIVE AND OBJECTIVE BOX
INTERVAL HPI/OVERNIGHT EVENTS:  89 Y/O female PMHx, PE,HTN, and Alzheimers Dementia, chair bound at home with 24 hour aide BIBEMS for decreasing O2 saturation going from 2LNC to 4LNC at home. While in ED, pt was documented has having increasing CO2 levels while on BIPAP. Admitted to ICU with continuous BIPAP support, transferred to telemetry 03/22/17.  Resting comfortably , arouse able, on nasal O2.    Vital Signs Last 24 Hrs  T(C): 37.6, Max: 37.7 (03-25 @ 00:10)  T(F): 99.6, Max: 99.8 (03-25 @ 00:10)  HR: 78 (78 - 88)  BP: 152/91 (118/69 - 152/91)  BP(mean): --  RR: 18 (17 - 20)  SpO2: 93% (93% - 97%)    PHYSICAL EXAM:  GEN:        Awake, responsive and comfortable.  HEENT:    Normal.    RESP:      no wheezing.  CVS:             Regular rate and rhythm.   ABD:         Soft, non-tender, non-distended;     MEDICATIONS  (STANDING):  heparin  Injectable 5000Unit(s) SubCutaneous every 12 hours  ALBUTerol/ipratropium for Nebulization 3milliLiter(s) Nebulizer every 6 hours  amLODIPine   Tablet 2.5milliGRAM(s) Oral daily  pantoprazole    Tablet 40milliGRAM(s) Oral before breakfast  cefdinir 300milliGRAM(s) Oral two times a day  QUEtiapine 50milliGRAM(s) Oral two times a day    ASSESSMENT AND PLAN:  ·	Acute hypoxic/hypercarbic Respiratory failure.  ·	RLL Pneumonia.  ·	Anemia.  ·	HTN.  ·	Alzheimer Dementia.    ABG noted, has hypercarbia but no acidosis.  Continue supplemental O2.  DC planning.

## 2017-03-26 RX ADMIN — QUETIAPINE FUMARATE 50 MILLIGRAM(S): 200 TABLET, FILM COATED ORAL at 17:43

## 2017-03-26 RX ADMIN — HEPARIN SODIUM 5000 UNIT(S): 5000 INJECTION INTRAVENOUS; SUBCUTANEOUS at 17:44

## 2017-03-26 RX ADMIN — CEFDINIR 300 MILLIGRAM(S): 250 POWDER, FOR SUSPENSION ORAL at 06:11

## 2017-03-26 RX ADMIN — HEPARIN SODIUM 5000 UNIT(S): 5000 INJECTION INTRAVENOUS; SUBCUTANEOUS at 06:11

## 2017-03-26 RX ADMIN — QUETIAPINE FUMARATE 50 MILLIGRAM(S): 200 TABLET, FILM COATED ORAL at 06:11

## 2017-03-26 RX ADMIN — AMLODIPINE BESYLATE 2.5 MILLIGRAM(S): 2.5 TABLET ORAL at 06:11

## 2017-03-26 RX ADMIN — Medication 3 MILLILITER(S): at 17:17

## 2017-03-26 RX ADMIN — CEFDINIR 300 MILLIGRAM(S): 250 POWDER, FOR SUSPENSION ORAL at 17:44

## 2017-03-26 RX ADMIN — Medication 3 MILLILITER(S): at 12:18

## 2017-03-26 RX ADMIN — Medication 3 MILLILITER(S): at 06:05

## 2017-03-26 RX ADMIN — PANTOPRAZOLE SODIUM 40 MILLIGRAM(S): 20 TABLET, DELAYED RELEASE ORAL at 06:11

## 2017-03-26 NOTE — PROGRESS NOTE ADULT - SUBJECTIVE AND OBJECTIVE BOX
INTERVAL HPI/OVERNIGHT EVENTS:  89 Y/O female PMHx, PE,HTN, and Alzheimers Dementia, chair bound at home with 24 hour aide BIBEMS for decreasing O2 saturation going from 2LNC to 4LNC at home. While in ED, pt was documented has having increasing CO2 levels while on BIPAP. Admitted to ICU with continuous BIPAP support, transferred to telemetry 03/22/17.  Awake and comfortable in bed. Daughter by bed side.    Vital Signs Last 24 Hrs  T(C): 37.3, Max: 37.3 (03-26 @ 16:32)  T(F): 99.2, Max: 99.2 (03-26 @ 16:32)  HR: 74 (64 - 87)  BP: 133/64 (133/64 - 157/82)  BP(mean): --  RR: 18 (17 - 18)  SpO2: 94% (92% - 99%)    PHYSICAL EXAM:  GEN:         Awake, responsive and comfortable.  HEENT:    Normal.    RESP:      no wheezing.  CVS:         Regular rate and rhythm.   ABD:         Soft, non-tender, non-distended;   :            No costovertebral angle tenderness  EXTR:         No clubbing, cyanosis or edema  CNS:           Intact sensory and motor function.    MEDICATIONS  (STANDING):  heparin  Injectable 5000Unit(s) SubCutaneous every 12 hours  ALBUTerol/ipratropium for Nebulization 3milliLiter(s) Nebulizer every 6 hours  amLODIPine   Tablet 2.5milliGRAM(s) Oral daily  pantoprazole    Tablet 40milliGRAM(s) Oral before breakfast  cefdinir 300milliGRAM(s) Oral two times a day  QUEtiapine 50milliGRAM(s) Oral two times a day    ASSESSMENT AND PLAN:  ·	Acute hypoxic/hypercarbic Respiratory failure.  ·	RLL Pneumonia.  ·	Hypoxia, on home O2.  ·	Anemia.  ·	HTN.    Continue supportive treatment.  DC planning.  Discussed with daughter.  ·	Alzheimer Dementia.

## 2017-03-26 NOTE — PROGRESS NOTE ADULT - SUBJECTIVE AND OBJECTIVE BOX
Infectious Disease Service     Hanna seen status post sepsis now resolved on po antibiotics   no new complaints     Physical exam    Gen:  No acute distress  Heent: PERRLA EOMI  Heart: RRR S1S2  Lungs: Clear to auscultation  Abd: BS + sodft and depressible non tender  Ext: No cyanosis or edema    Laboratory test   laboratory test reviewed     vital signs reviewed

## 2017-03-26 NOTE — PROGRESS NOTE ADULT - ASSESSMENT
Pt is 89 yo F with h/o HTN, PE, on home O2?, dementia and chair bound at home with 24 hour aide BIBEMS for decreasing O2 saturation going from 2LNC to 4LNC at home. In the ER pt was found to be mildly hypercapneic and placed on BiPAP. Pt worked up with CTA and found to have possible R LL PNA.  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 is a COPDer, has (+) smoking hx)/ 2) R LL PNA    Resp: Cont Nebs + supplemental O2  ID: Finish course of Ceftriaxone and Zithromax  CVS: Pt noted have intermittent but hemodynamically stable Mobitz I and II  FEN: May require formal Speech/swallow evaluation  Social: May transfer to tele/ SW spoke to family today and they said they would know when to modify code status/ Pt is full code    CCT 35 minPt is 89 yo F with h/o HTN, PE, on home O2?, dementia and chair bound at home with 24 hour aide BIBEMS for decreasing O2 saturation going from 2LNC to 4LNC at home. While in ED, pt was documented has having increasing CO2 levels while on BIPAP. ICU called for further evaluation..
Pt is 89 yo F with h/o HTN, PE, on home O2?, dementia and chair bound at home with 24 hour aide BIBEMS for decreasing O2 saturation going from 2LNC to 4LNC at home. In the ER pt was found to be mildly hypercapneic and placed on BiPAP. Pt worked up with CTA and found to have possible R LL PNA.  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 is a COPDer, has (+) smoking hx)/ 2) R LL PNA    Resp: Cont Nebs + supplemental O2  ID: Cont Ceftriaxone and Zithromax  FEN: May require formal Speech/swallow evaluation  Social: [facility]    CCT 35 minPt is 89 yo F with h/o HTN, PE, on home O2?, dementia and chair bound at home with 24 hour aide BIBEMS for decreasing O2 saturation going from 2LNC to 4LNC at home. While in ED, pt was documented has having increasing CO2 levels while on BIPAP. ICU called for further evaluation..
resolved sepsis   on po antibiotics  no new complaints   afebrile   to complete 7 days of antibiotics  clinically stable from infectious disease standpoint. Will sign off please reconsult if needed.
sepsis   tme ; toxic metabolic encephalopathy  ? urinary tract infection   continue antibiotics   will follow with you thx
sepsis   tme ; toxic metabolic encephalopathy  ? urinary tract infection   continue antibiotics   day 5 of antibiotics   plan po and watch   discharge plan   po switch and observe   all sepsis work up NEGATIVE at this time

## 2017-03-26 NOTE — PROGRESS NOTE ADULT - SUBJECTIVE AND OBJECTIVE BOX
Patient is a 89y old  Female who presents with a chief complaint of increasing O2 demands (20 Mar 2017 05:47)       INTERVAL HPI/OVERNIGHT EVENTS:   pt with no acute distress  MEDICATIONS  (STANDING):  heparin  Injectable 5000Unit(s) SubCutaneous every 12 hours  ALBUTerol/ipratropium for Nebulization 3milliLiter(s) Nebulizer every 6 hours  amLODIPine   Tablet 2.5milliGRAM(s) Oral daily  pantoprazole    Tablet 40milliGRAM(s) Oral before breakfast  cefdinir 300milliGRAM(s) Oral two times a day  QUEtiapine 50milliGRAM(s) Oral two times a day    MEDICATIONS  (PRN):      Allergies    Haldol (Unknown)    Intolerances        REVIEW OF SYSTEMS:  CONSTITUTIONAL: No fever, weight loss, or fatigue  EYES: No eye pain, visual disturbances, or discharge  ENMT:  No difficulty hearing, tinnitus, vertigo; No sinus or throat pain  NECK: No pain or stiffness  BREASTS: No pain, masses, or nipple discharge  RESPIRATORY: No cough, wheezing, chills or hemoptysis; No shortness of breath  CARDIOVASCULAR: No chest pain, palpitations, dizziness, or leg swelling  GASTROINTESTINAL: No abdominal or epigastric pain. No nausea, vomiting, or hematemesis; No diarrhea or constipation. No melena or hematochezia.  GENITOURINARY: No dysuria, frequency, hematuria, or incontinence  NEUROLOGICAL: No headaches, memory loss, loss of strength, numbness, or tremors  SKIN: No itching, burning, rashes, or lesions   LYMPH NODES: No enlarged glands  ENDOCRINE: No heat or cold intolerance; No hair loss  MUSCULOSKELETAL: No joint pain or swelling; No muscle, back, or extremity pain  PSYCHIATRIC: No depression, anxiety, mood swings, or difficulty sleeping  HEME/LYMPH: No easy bruising, or bleeding gums  ALLERGY AND IMMUNOLOGIC: No hives or eczema    Vital Signs Last 24 Hrs  T(C): 36.2, Max: 37.6 (03-25 @ 16:45)  T(F): 97.2, Max: 99.6 (03-25 @ 16:45)  HR: 84 (78 - 87)  BP: 155/68 (152/91 - 157/82)  BP(mean): --  RR: 18 (18 - 18)  SpO2: 92% (92% - 97%)    PHYSICAL EXAM:  GENERAL: NAD, well-groomed, well-developed  HEAD:  Atraumatic, Normocephalic  EYES: EOMI, PERRLA, conjunctiva and sclera clear  ENMT: No tonsillar erythema, exudates, or enlargement; Moist mucous membranes, Good dentition, No lesions  NECK: Supple, No JVD, Normal thyroid  NERVOUS SYSTEM:  Alert & Oriented X3, Good concentration; Motor Strength 5/5 B/L upper and lower extremities; DTRs 2+ intact and symmetric  CHEST/LUNG: Clear to percussion bilaterally; No rales, rhonchi, wheezing, or rubs  HEART: Regular rate and rhythm; No murmurs, rubs, or gallops  ABDOMEN: Soft, Nontender, Nondistended; Bowel sounds present  EXTREMITIES:  2+ Peripheral Pulses, No clubbing, cyanosis, or edema  LYMPH: No lymphadenopathy noted  SKIN: No rashes or lesions    LABS:              CAPILLARY BLOOD GLUCOSE    CULTURES:    HEMOGLOBIN A1C:    CHOLESTEROL:    ABG - ( 24 Mar 2017 18:46 )  pH: x     /  pCO2: 47    /  pO2: 53    / HCO3: 34    / Base Excess: 9.0   /  SaO2: 85                  RADIOLOGY & ADDITIONAL TESTS:

## 2017-03-27 LAB
ANION GAP SERPL CALC-SCNC: 6 MMOL/L — SIGNIFICANT CHANGE UP (ref 5–17)
BUN SERPL-MCNC: 18 MG/DL — SIGNIFICANT CHANGE UP (ref 7–23)
CALCIUM SERPL-MCNC: 9 MG/DL — SIGNIFICANT CHANGE UP (ref 8.5–10.1)
CHLORIDE SERPL-SCNC: 103 MMOL/L — SIGNIFICANT CHANGE UP (ref 96–108)
CO2 SERPL-SCNC: 34 MMOL/L — HIGH (ref 22–31)
CREAT SERPL-MCNC: 0.87 MG/DL — SIGNIFICANT CHANGE UP (ref 0.5–1.3)
GLUCOSE SERPL-MCNC: 78 MG/DL — SIGNIFICANT CHANGE UP (ref 70–99)
HCT VFR BLD CALC: 34.7 % — SIGNIFICANT CHANGE UP (ref 34.5–45)
HGB BLD-MCNC: 10.6 G/DL — LOW (ref 11.5–15.5)
MCHC RBC-ENTMCNC: 27.2 PG — SIGNIFICANT CHANGE UP (ref 27–34)
MCHC RBC-ENTMCNC: 30.6 GM/DL — LOW (ref 32–36)
MCV RBC AUTO: 89 FL — SIGNIFICANT CHANGE UP (ref 80–100)
PLATELET # BLD AUTO: 342 K/UL — SIGNIFICANT CHANGE UP (ref 150–400)
POTASSIUM SERPL-MCNC: 4.1 MMOL/L — SIGNIFICANT CHANGE UP (ref 3.5–5.3)
POTASSIUM SERPL-SCNC: 4.1 MMOL/L — SIGNIFICANT CHANGE UP (ref 3.5–5.3)
RBC # BLD: 3.9 M/UL — SIGNIFICANT CHANGE UP (ref 3.8–5.2)
RBC # FLD: 20.3 % — HIGH (ref 11–15)
SODIUM SERPL-SCNC: 143 MMOL/L — SIGNIFICANT CHANGE UP (ref 135–145)
WBC # BLD: 8.1 K/UL — SIGNIFICANT CHANGE UP (ref 3.8–10.5)
WBC # FLD AUTO: 8.1 K/UL — SIGNIFICANT CHANGE UP (ref 3.8–10.5)

## 2017-03-27 RX ADMIN — QUETIAPINE FUMARATE 50 MILLIGRAM(S): 200 TABLET, FILM COATED ORAL at 17:19

## 2017-03-27 RX ADMIN — HEPARIN SODIUM 5000 UNIT(S): 5000 INJECTION INTRAVENOUS; SUBCUTANEOUS at 17:19

## 2017-03-27 RX ADMIN — HEPARIN SODIUM 5000 UNIT(S): 5000 INJECTION INTRAVENOUS; SUBCUTANEOUS at 07:09

## 2017-03-27 RX ADMIN — PANTOPRAZOLE SODIUM 40 MILLIGRAM(S): 20 TABLET, DELAYED RELEASE ORAL at 06:21

## 2017-03-27 RX ADMIN — QUETIAPINE FUMARATE 50 MILLIGRAM(S): 200 TABLET, FILM COATED ORAL at 06:21

## 2017-03-27 RX ADMIN — CEFDINIR 300 MILLIGRAM(S): 250 POWDER, FOR SUSPENSION ORAL at 17:19

## 2017-03-27 RX ADMIN — Medication 3 MILLILITER(S): at 17:23

## 2017-03-27 RX ADMIN — Medication 3 MILLILITER(S): at 05:44

## 2017-03-27 RX ADMIN — AMLODIPINE BESYLATE 2.5 MILLIGRAM(S): 2.5 TABLET ORAL at 06:21

## 2017-03-27 RX ADMIN — CEFDINIR 300 MILLIGRAM(S): 250 POWDER, FOR SUSPENSION ORAL at 06:21

## 2017-03-27 RX ADMIN — Medication 3 MILLILITER(S): at 00:00

## 2017-03-27 RX ADMIN — Medication 3 MILLILITER(S): at 11:57

## 2017-03-27 RX ADMIN — Medication 3 MILLILITER(S): at 23:26

## 2017-03-27 NOTE — PROGRESS NOTE ADULT - PROBLEM SELECTOR PROBLEM 2
Bradycardia
Urinary tract infection without hematuria, site unspecified
UTI (urinary tract infection)

## 2017-03-27 NOTE — PROGRESS NOTE ADULT - PROBLEM SELECTOR PROBLEM 3
Alzheimer's dementia with behavioral disturbance, unspecified timing of dementia onset
Alzheimer's dementia with behavioral disturbance, unspecified timing of dementia onset
Bradycardia
Bradycardia
Pneumonia due to infectious organism, unspecified laterality, unspecified part of lung
Urinary tract infection without hematuria, site unspecified
Ovarian cancer

## 2017-03-27 NOTE — PROGRESS NOTE ADULT - PROBLEM SELECTOR PROBLEM 1
Bradycardia
Pneumonia due to infectious organism, unspecified laterality, unspecified part of lung
R/O COPD (chronic obstructive pulmonary disease) with acute bronchitis
Pneumonia due to infectious organism, unspecified laterality, unspecified part of lung

## 2017-03-27 NOTE — PROGRESS NOTE ADULT - SUBJECTIVE AND OBJECTIVE BOX
Patient is a 89y old  Female who presents with a chief complaint of increasing O2 demands (20 Mar 2017 05:47)      INTERVAL HPI/OVERNIGHT EVENTS: pt is sleeping all the time as per the nurse    MEDICATIONS  (STANDING):  heparin  Injectable 5000Unit(s) SubCutaneous every 12 hours  ALBUTerol/ipratropium for Nebulization 3milliLiter(s) Nebulizer every 6 hours  amLODIPine   Tablet 2.5milliGRAM(s) Oral daily  pantoprazole    Tablet 40milliGRAM(s) Oral before breakfast  cefdinir 300milliGRAM(s) Oral two times a day  QUEtiapine 50milliGRAM(s) Oral two times a day    MEDICATIONS  (PRN):      Allergies    Haldol (Unknown)    Intolerances        REVIEW OF SYSTEMS:  CONSTITUTIONAL: No fever, weight loss, or fatigue  EYES: No eye pain, visual disturbances, or discharge  ENMT:  No difficulty hearing, tinnitus, vertigo; No sinus or throat pain  NECK: No pain or stiffness  BREASTS: No pain, masses, or nipple discharge  RESPIRATORY: No cough, wheezing, chills or hemoptysis; No shortness of breath  CARDIOVASCULAR: No chest pain, palpitations, dizziness, or leg swelling  GASTROINTESTINAL: No abdominal or epigastric pain. No nausea, vomiting, or hematemesis; No diarrhea or constipation. No melena or hematochezia.  GENITOURINARY: No dysuria, frequency, hematuria, or incontinence  NEUROLOGICAL: No headaches, memory loss, loss of strength, numbness, or tremors  SKIN: No itching, burning, rashes, or lesions   LYMPH NODES: No enlarged glands  ENDOCRINE: No heat or cold intolerance; No hair loss  MUSCULOSKELETAL: No joint pain or swelling; No muscle, back, or extremity pain  PSYCHIATRIC: No depression, anxiety, mood swings, or difficulty sleeping  HEME/LYMPH: No easy bruising, or bleeding gums  ALLERGY AND IMMUNOLOGIC: No hives or eczema    Vital Signs Last 24 Hrs  T(C): 36.7, Max: 37.3 (03-26 @ 16:32)  T(F): 98.1, Max: 99.2 (03-26 @ 16:32)  HR: 74 (64 - 85)  BP: 147/69 (120/72 - 147/69)  BP(mean): --  RR: 18 (16 - 18)  SpO2: 95% (92% - 100%)    PHYSICAL EXAM:  GENERAL: NAD, well-groomed, well-developed  HEAD:  Atraumatic, Normocephalic  EYES: EOMI, PERRLA, conjunctiva and sclera clear  ENMT: No tonsillar erythema, exudates, or enlargement; Moist mucous membranes, Good dentition, No lesions  NECK: Supple, No JVD, Normal thyroid  NERVOUS SYSTEM:  Alert & Oriented X3, Good concentration; Motor Strength 5/5 B/L upper and lower extremities; DTRs 2+ intact and symmetric  CHEST/LUNG: Clear to percussion bilaterally; No rales, rhonchi, wheezing, or rubs  HEART: Regular rate and rhythm; No murmurs, rubs, or gallops  ABDOMEN: Soft, Nontender, Nondistended; Bowel sounds present  EXTREMITIES:  2+ Peripheral Pulses, No clubbing, cyanosis, or edema  LYMPH: No lymphadenopathy noted  SKIN: No rashes or lesions    LABS:                        10.6   8.1   )-----------( 342      ( 27 Mar 2017 07:18 )             34.7     27 Mar 2017 07:18    143    |  103    |  18     ----------------------------<  78     4.1     |  34     |  0.87     Ca    9.0        27 Mar 2017 07:18          CAPILLARY BLOOD GLUCOSE    CULTURES:    HEMOGLOBIN A1C:    CHOLESTEROL:        RADIOLOGY & ADDITIONAL TESTS:

## 2017-03-27 NOTE — PROGRESS NOTE ADULT - PROBLEM SELECTOR PROBLEM 4
Pneumonia due to infectious organism, unspecified laterality, unspecified part of lung
Alzheimer's Dementia with Behavioral Disturbance

## 2017-03-27 NOTE — PROGRESS NOTE ADULT - SUBJECTIVE AND OBJECTIVE BOX
INTERVAL HPI/OVERNIGHT EVENTS:  89 Y/O female PMHx, PE,HTN, and Alzheimers Dementia, chair bound at home with 24 hour aide BIBEMS for decreasing O2 saturation going from 2LNC to 4LNC at home. While in ED, pt was documented has having increasing CO2 levels while on BIPAP. Admitted to ICU with continuous BIPAP support, transferred to telemetry 03/22/17.  Resting comfortably but arouse able.    Vital Signs Last 24 Hrs  T(C): 36.7, Max: 37.1 (03-26 @ 23:45)  T(F): 98.1, Max: 98.8 (03-26 @ 23:45)  HR: 74 (64 - 85)  BP: 147/69 (120/72 - 147/69)  BP(mean): --  RR: 18 (16 - 18)  SpO2: 95% (92% - 100%)    PHYSICAL EXAM:  GEN:         Awake, responsive and comfortable.  HEENT:    Normal.    RESP:      no wheezing.  CVS:         Regular rate and rhythm.   ABD:         Soft, non-tender, non-distended;     MEDICATIONS  (STANDING):  heparin  Injectable 5000Unit(s) SubCutaneous every 12 hours  ALBUTerol/ipratropium for Nebulization 3milliLiter(s) Nebulizer every 6 hours  amLODIPine   Tablet 2.5milliGRAM(s) Oral daily  pantoprazole    Tablet 40milliGRAM(s) Oral before breakfast  cefdinir 300milliGRAM(s) Oral two times a day  QUEtiapine 50milliGRAM(s) Oral two times a day    LABS:                        10.6   8.1   )-----------( 342      ( 27 Mar 2017 07:18 )             34.7     27 Mar 2017 07:18    143    |  103    |  18     ----------------------------<  78     4.1     |  34     |  0.87     Ca    9.0        27 Mar 2017 07:18    ASSESSMENT AND PLAN:  ·	Acute hypoxic/hypercarbic Respiratory failure.  ·	RLL Pneumonia.  ·	Hypoxia, on home O2.  ·	Anemia.  ·	HTN.    Still using nocturnal BIPAP.  May benefit from home NIV as came with hypoxic/hypercarbic Respiratory failure.

## 2017-03-28 RX ADMIN — QUETIAPINE FUMARATE 50 MILLIGRAM(S): 200 TABLET, FILM COATED ORAL at 06:35

## 2017-03-28 RX ADMIN — PANTOPRAZOLE SODIUM 40 MILLIGRAM(S): 20 TABLET, DELAYED RELEASE ORAL at 06:35

## 2017-03-28 RX ADMIN — QUETIAPINE FUMARATE 50 MILLIGRAM(S): 200 TABLET, FILM COATED ORAL at 17:27

## 2017-03-28 RX ADMIN — HEPARIN SODIUM 5000 UNIT(S): 5000 INJECTION INTRAVENOUS; SUBCUTANEOUS at 06:35

## 2017-03-28 RX ADMIN — Medication 3 MILLILITER(S): at 05:34

## 2017-03-28 RX ADMIN — CEFDINIR 300 MILLIGRAM(S): 250 POWDER, FOR SUSPENSION ORAL at 06:35

## 2017-03-28 RX ADMIN — HEPARIN SODIUM 5000 UNIT(S): 5000 INJECTION INTRAVENOUS; SUBCUTANEOUS at 17:27

## 2017-03-28 RX ADMIN — AMLODIPINE BESYLATE 2.5 MILLIGRAM(S): 2.5 TABLET ORAL at 06:35

## 2017-03-28 RX ADMIN — CEFDINIR 300 MILLIGRAM(S): 250 POWDER, FOR SUSPENSION ORAL at 17:27

## 2017-03-28 RX ADMIN — Medication 3 MILLILITER(S): at 17:18

## 2017-03-28 RX ADMIN — Medication 3 MILLILITER(S): at 11:27

## 2017-03-28 NOTE — PROGRESS NOTE ADULT - SUBJECTIVE AND OBJECTIVE BOX
INTERVAL HPI/OVERNIGHT EVENTS:  89 Y/O female PMHx, PE,HTN, and Alzheimers Dementia, chair bound at home with 24 hour aide BIBEMS for decreasing O2 saturation going from 2LNC to 4LNC at home. While in ED, pt was documented has having increasing CO2 levels while on BIPAP. Admitted to ICU with continuous BIPAP support, transferred to telemetry 03/22/17.  Resting comfortably in bed.    Vital Signs Last 24 Hrs  T(C): 36.8, Max: 37.1 (03-27 @ 23:49)  T(F): 98.2, Max: 98.8 (03-27 @ 23:49)  HR: 71 (68 - 97)  BP: 130/76 (109/62 - 130/76)  BP(mean): --  RR: 18 (17 - 18)  SpO2: 95% (92% - 99%)    PHYSICAL EXAM:  GEN:        Awake, responsive and comfortable.  HEENT:    Normal.    RESP:      no wheezing.  CVS:          Regular rate and rhythm.   ABD:         Soft, non-tender, non-distended;   :            No costovertebral angle tenderness  EXTR:          No clubbing, cyanosis or edema  CNS:           Intact sensory and motor function.    MEDICATIONS  (STANDING):  heparin  Injectable 5000Unit(s) SubCutaneous every 12 hours  ALBUTerol/ipratropium for Nebulization 3milliLiter(s) Nebulizer every 6 hours  amLODIPine   Tablet 2.5milliGRAM(s) Oral daily  pantoprazole    Tablet 40milliGRAM(s) Oral before breakfast  cefdinir 300milliGRAM(s) Oral two times a day  QUEtiapine 50milliGRAM(s) Oral two times a day    LABS:                        10.6   8.1   )-----------( 342      ( 27 Mar 2017 07:18 )             34.7     27 Mar 2017 07:18    143    |  103    |  18     ----------------------------<  78     4.1     |  34     |  0.87     Ca    9.0        27 Mar 2017 07:18    ASSESSMENT AND PLAN:  ·	Acute hypoxic/hypercarbic Respiratory failure.  ·	RLL Pneumonia.  ·	Hypoxia, on home O2.  ·	Anemia.  ·	HTN.    Continue treatment.  Waiting for home NIV Approval.

## 2017-03-29 VITALS
SYSTOLIC BLOOD PRESSURE: 128 MMHG | DIASTOLIC BLOOD PRESSURE: 56 MMHG | OXYGEN SATURATION: 91 % | TEMPERATURE: 98 F | HEART RATE: 91 BPM | RESPIRATION RATE: 18 BRPM

## 2017-03-29 RX ORDER — QUETIAPINE FUMARATE 200 MG/1
1 TABLET, FILM COATED ORAL
Qty: 60 | Refills: 0 | OUTPATIENT
Start: 2017-03-29 | End: 2017-04-28

## 2017-03-29 RX ADMIN — Medication 3 MILLILITER(S): at 05:57

## 2017-03-29 RX ADMIN — Medication 3 MILLILITER(S): at 00:07

## 2017-03-29 RX ADMIN — CEFDINIR 300 MILLIGRAM(S): 250 POWDER, FOR SUSPENSION ORAL at 05:21

## 2017-03-29 RX ADMIN — QUETIAPINE FUMARATE 50 MILLIGRAM(S): 200 TABLET, FILM COATED ORAL at 05:21

## 2017-03-29 RX ADMIN — HEPARIN SODIUM 5000 UNIT(S): 5000 INJECTION INTRAVENOUS; SUBCUTANEOUS at 05:21

## 2017-03-29 RX ADMIN — Medication 3 MILLILITER(S): at 17:01

## 2017-03-29 RX ADMIN — AMLODIPINE BESYLATE 2.5 MILLIGRAM(S): 2.5 TABLET ORAL at 05:21

## 2017-03-29 RX ADMIN — PANTOPRAZOLE SODIUM 40 MILLIGRAM(S): 20 TABLET, DELAYED RELEASE ORAL at 06:01

## 2017-03-29 RX ADMIN — Medication 3 MILLILITER(S): at 11:19

## 2017-03-29 NOTE — DISCHARGE NOTE ADULT - HOSPITAL COURSE
89 Y/O female PMHx of Dementia, PE, chair bound at home with 24 hour aide BIBEMS for decreasing O2 saturation going from 2LNC to 4LNC at home. While in ED, pt was documented has having increasing CO2 levels while on BIPAP.

## 2017-03-29 NOTE — DISCHARGE NOTE ADULT - MEDICATION SUMMARY - MEDICATIONS TO TAKE
I will START or STAY ON the medications listed below when I get home from the hospital:    pulse oximeter  -- Indication: For COPD (chronic obstructive pulmonary disease) with acute bronchitis    clonazePAM 0.25 mg oral tablet  -- 0.5 milligram(s) by mouth , As Needed  -- Indication: For Alzheimer's Dementia with Behavioral Disturbance    SEROquel 25 mg oral tablet  -- 1 tab(s) by mouth 2 times a day  -- Indication: For Alzheimer's Dementia with Behavioral Disturbance    amLODIPine 2.5 mg oral tablet  -- 1 tab(s) by mouth once a day  -- Indication: For HTN    Nuedexta  --  by mouth   -- Indication: For Alzheimer's Dementia with Behavioral Disturbance    pantoprazole 40 mg oral delayed release tablet  -- 1 tab(s) by mouth once a day (before a meal)  -- Indication: For gerd

## 2017-03-29 NOTE — DISCHARGE NOTE ADULT - CARE PLAN
Principal Discharge DX:	Pneumonia due to infectious organism, unspecified laterality, unspecified part of lung  Goal:	follow up pmd in 1 week  Instructions for follow-up, activity and diet:	take med as directed  Secondary Diagnosis:	Urinary tract infection without hematuria, site unspecified  Secondary Diagnosis:	Alzheimer's dementia with behavioral disturbance, unspecified timing of dementia onset  Secondary Diagnosis:	COPD (chronic obstructive pulmonary disease) with acute bronchitis

## 2017-03-29 NOTE — PROGRESS NOTE ADULT - SUBJECTIVE AND OBJECTIVE BOX
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   Seroquel may potentiate AV block, necessary and long term treatment in this patient, SBP remains stable, asymptomatic. Will continue observe tele for now.   Will hold Seroquel for a day or two, before starting 25mg daily, response in HR is improved  No PPM needed. May DC plan. Start Seroquel as out patient slowly as needed  DC plan

## 2017-03-29 NOTE — PROGRESS NOTE ADULT - PROVIDER SPECIALTY LIST ADULT
Cardiology
Critical Care
Critical Care
Infectious Disease
Infectious Disease
Internal Medicine
Pulmonology
Cardiology
Infectious Disease

## 2017-03-29 NOTE — DISCHARGE NOTE ADULT - MEDICATION SUMMARY - MEDICATIONS TO STOP TAKING
I will STOP taking the medications listed below when I get home from the hospital:    Seroquel 50 mg oral tablet  -- 1 tab(s) by mouth 2 times a day    levoFLOXacin 500 mg oral tablet  -- 1 tab(s) by mouth every 24 hours

## 2017-03-29 NOTE — DISCHARGE NOTE ADULT - SECONDARY DIAGNOSIS.
Urinary tract infection without hematuria, site unspecified Alzheimer's dementia with behavioral disturbance, unspecified timing of dementia onset COPD (chronic obstructive pulmonary disease) with acute bronchitis

## 2017-03-29 NOTE — PROGRESS NOTE ADULT - SUBJECTIVE AND OBJECTIVE BOX
INTERVAL HPI/OVERNIGHT EVENTS:  89 Y/O female PMHx, PE,HTN, and Alzheimers Dementia, chair bound at home with 24 hour aide BIBEMS for decreasing O2 saturation going from 2LNC to 4LNC at home. While in ED, pt was documented has having increasing CO2 levels while on BIPAP. Admitted to ICU with continuous BIPAP support, transferred to telemetry 03/22/17.  Resting comfortably in bed on nasal O2. No distress observed.    Vital Signs Last 24 Hrs  T(C): 36.9, Max: 37.1 (03-29 @ 05:17)  T(F): 98.4, Max: 98.8 (03-29 @ 05:17)  HR: 91 (71 - 91)  BP: 128/56 (106/53 - 165/110)  BP(mean): --  RR: 18 (18 - 18)  SpO2: 91% (91% - 100%)    PHYSICAL EXAM:  GEN:        Awake, responsive and comfortable.  HEENT:    Normal.    RESP:      no wheezing.  CVS:          Regular rate and rhythm.   ABD:         Soft, non-tender, non-distended;     MEDICATIONS  (STANDING):  heparin  Injectable 5000Unit(s) SubCutaneous every 12 hours  ALBUTerol/ipratropium for Nebulization 3milliLiter(s) Nebulizer every 6 hours  amLODIPine   Tablet 2.5milliGRAM(s) Oral daily  pantoprazole    Tablet 40milliGRAM(s) Oral before breakfast  cefdinir 300milliGRAM(s) Oral two times a day  QUEtiapine 50milliGRAM(s) Oral two times a day    ASSESSMENT AND PLAN:  ·	Acute hypoxic/hypercarbic Respiratory failure.  ·	RLL Pneumonia.  ·	Hypoxia, on home O2.  ·	Anemia.  ·	HTN.    Care management reports that home NIV was not approved.  Pulmonary status close to base line.  For discharge home, is already on home O2.

## 2017-06-29 ENCOUNTER — INPATIENT (INPATIENT)
Facility: HOSPITAL | Age: 82
LOS: 7 days | Discharge: HOME HEALTH | End: 2017-07-07
Attending: INTERNAL MEDICINE | Admitting: INTERNAL MEDICINE
Payer: MEDICARE

## 2017-06-29 VITALS
WEIGHT: 154.32 LBS | RESPIRATION RATE: 12 BRPM | OXYGEN SATURATION: 99 % | DIASTOLIC BLOOD PRESSURE: 90 MMHG | HEART RATE: 87 BPM | SYSTOLIC BLOOD PRESSURE: 189 MMHG | HEIGHT: 60 IN

## 2017-06-29 DIAGNOSIS — K59.00 CONSTIPATION, UNSPECIFIED: ICD-10-CM

## 2017-06-29 DIAGNOSIS — N39.0 URINARY TRACT INFECTION, SITE NOT SPECIFIED: ICD-10-CM

## 2017-06-29 DIAGNOSIS — G30.8 OTHER ALZHEIMER'S DISEASE: ICD-10-CM

## 2017-06-29 DIAGNOSIS — Z95.2 PRESENCE OF PROSTHETIC HEART VALVE: Chronic | ICD-10-CM

## 2017-06-29 DIAGNOSIS — J96.00 ACUTE RESPIRATORY FAILURE, UNSPECIFIED WHETHER WITH HYPOXIA OR HYPERCAPNIA: ICD-10-CM

## 2017-06-29 DIAGNOSIS — I10 ESSENTIAL (PRIMARY) HYPERTENSION: ICD-10-CM

## 2017-06-29 LAB
ALBUMIN SERPL ELPH-MCNC: 3 G/DL — LOW (ref 3.3–5)
ALP SERPL-CCNC: 121 U/L — HIGH (ref 40–120)
ALT FLD-CCNC: 19 U/L — SIGNIFICANT CHANGE UP (ref 12–78)
AMMONIA BLD-MCNC: 15 UMOL/L — SIGNIFICANT CHANGE UP (ref 11–32)
ANION GAP SERPL CALC-SCNC: 6 MMOL/L — SIGNIFICANT CHANGE UP (ref 5–17)
ANISOCYTOSIS BLD QL: SLIGHT — SIGNIFICANT CHANGE UP
APPEARANCE UR: CLEAR — SIGNIFICANT CHANGE UP
APTT BLD: 33.4 SEC — SIGNIFICANT CHANGE UP (ref 27.5–37.4)
AST SERPL-CCNC: 26 U/L — SIGNIFICANT CHANGE UP (ref 15–37)
BASE EXCESS BLDA CALC-SCNC: 2.7 MMOL/L — HIGH (ref -2–2)
BASE EXCESS BLDA CALC-SCNC: 4 MMOL/L — HIGH (ref -2–2)
BILIRUB SERPL-MCNC: 0.4 MG/DL — SIGNIFICANT CHANGE UP (ref 0.2–1.2)
BILIRUB UR-MCNC: NEGATIVE — SIGNIFICANT CHANGE UP
BLD GP AB SCN SERPL QL: SIGNIFICANT CHANGE UP
BLOOD GAS COMMENTS: SIGNIFICANT CHANGE UP
BLOOD GAS SOURCE: SIGNIFICANT CHANGE UP
BLOOD GAS SOURCE: SIGNIFICANT CHANGE UP
BUN SERPL-MCNC: 7 MG/DL — SIGNIFICANT CHANGE UP (ref 7–23)
CA-I BLD-SCNC: 1.15 MMOL/L — SIGNIFICANT CHANGE UP (ref 1.12–1.3)
CALCIUM SERPL-MCNC: 8.6 MG/DL — SIGNIFICANT CHANGE UP (ref 8.5–10.1)
CHLORIDE SERPL-SCNC: 104 MMOL/L — SIGNIFICANT CHANGE UP (ref 96–108)
CK MB CFR SERPL CALC: 0.8 NG/ML — SIGNIFICANT CHANGE UP (ref 0.5–3.6)
CO2 SERPL-SCNC: 34 MMOL/L — HIGH (ref 22–31)
COLOR SPEC: YELLOW — SIGNIFICANT CHANGE UP
CREAT SERPL-MCNC: 0.85 MG/DL — SIGNIFICANT CHANGE UP (ref 0.5–1.3)
DIFF PNL FLD: ABNORMAL
GLUCOSE SERPL-MCNC: 127 MG/DL — HIGH (ref 70–99)
GLUCOSE UR QL: NEGATIVE MG/DL — SIGNIFICANT CHANGE UP
HCO3 BLDA-SCNC: 27 MMOL/L — SIGNIFICANT CHANGE UP (ref 21–29)
HCO3 BLDA-SCNC: 31 MMOL/L — HIGH (ref 21–29)
HCT VFR BLD CALC: 39.6 % — SIGNIFICANT CHANGE UP (ref 34.5–45)
HGB BLD-MCNC: 12.3 G/DL — SIGNIFICANT CHANGE UP (ref 11.5–15.5)
HOROWITZ INDEX BLDA+IHG-RTO: 50 — SIGNIFICANT CHANGE UP
HOROWITZ INDEX BLDA+IHG-RTO: 50 — SIGNIFICANT CHANGE UP
INR BLD: 0.96 RATIO — SIGNIFICANT CHANGE UP (ref 0.88–1.16)
KETONES UR-MCNC: NEGATIVE — SIGNIFICANT CHANGE UP
LACTATE SERPL-SCNC: 4.4 MMOL/L — CRITICAL HIGH (ref 0.7–2)
LACTATE SERPL-SCNC: 6.2 MMOL/L — CRITICAL HIGH (ref 0.7–2)
LEUKOCYTE ESTERASE UR-ACNC: NEGATIVE — SIGNIFICANT CHANGE UP
LIDOCAIN IGE QN: 130 U/L — SIGNIFICANT CHANGE UP (ref 73–393)
LYMPHOCYTES # BLD AUTO: 53 % — HIGH (ref 13–44)
MCHC RBC-ENTMCNC: 27.9 PG — SIGNIFICANT CHANGE UP (ref 27–34)
MCHC RBC-ENTMCNC: 31 GM/DL — LOW (ref 32–36)
MCV RBC AUTO: 90.3 FL — SIGNIFICANT CHANGE UP (ref 80–100)
MONOCYTES NFR BLD AUTO: 1 % — LOW (ref 2–14)
NEUTROPHILS NFR BLD AUTO: 41 % — LOW (ref 43–77)
NITRITE UR-MCNC: NEGATIVE — SIGNIFICANT CHANGE UP
NT-PROBNP SERPL-SCNC: 177 PG/ML — SIGNIFICANT CHANGE UP (ref 0–450)
OVALOCYTES BLD QL SMEAR: SLIGHT — SIGNIFICANT CHANGE UP
PCO2 BLDA: 42 MMHG — SIGNIFICANT CHANGE UP (ref 32–46)
PCO2 BLDA: 69 MMHG — HIGH (ref 32–46)
PH BLD: 7.28 — LOW (ref 7.35–7.45)
PH BLD: 7.42 — SIGNIFICANT CHANGE UP (ref 7.35–7.45)
PH UR: 8 — SIGNIFICANT CHANGE UP (ref 5–8)
PLAT MORPH BLD: NORMAL — SIGNIFICANT CHANGE UP
PLATELET # BLD AUTO: 217 K/UL — SIGNIFICANT CHANGE UP (ref 150–400)
PO2 BLDA: 119 MMHG — HIGH (ref 74–108)
PO2 BLDA: 91 MMHG — SIGNIFICANT CHANGE UP (ref 74–108)
POTASSIUM SERPL-MCNC: 4.3 MMOL/L — SIGNIFICANT CHANGE UP (ref 3.5–5.3)
POTASSIUM SERPL-SCNC: 4.3 MMOL/L — SIGNIFICANT CHANGE UP (ref 3.5–5.3)
PROT SERPL-MCNC: 8.2 GM/DL — SIGNIFICANT CHANGE UP (ref 6–8.3)
PROT UR-MCNC: 30 MG/DL
PROTHROM AB SERPL-ACNC: 10.4 SEC — SIGNIFICANT CHANGE UP (ref 9.8–12.7)
RBC # BLD: 4.39 M/UL — SIGNIFICANT CHANGE UP (ref 3.8–5.2)
RBC # FLD: 17.3 % — HIGH (ref 11–15)
RBC BLD AUTO: SIGNIFICANT CHANGE UP
RBC CASTS # UR COMP ASSIST: ABNORMAL /HPF (ref 0–4)
SAO2 % BLDA: 96 % — SIGNIFICANT CHANGE UP (ref 92–96)
SAO2 % BLDA: 98 % — HIGH (ref 92–96)
SODIUM SERPL-SCNC: 144 MMOL/L — SIGNIFICANT CHANGE UP (ref 135–145)
SP GR SPEC: 1.01 — SIGNIFICANT CHANGE UP (ref 1.01–1.02)
TROPONIN I SERPL-MCNC: <.015 NG/ML — SIGNIFICANT CHANGE UP (ref 0.01–0.04)
TSH SERPL-MCNC: 1.79 UU/ML — SIGNIFICANT CHANGE UP (ref 0.36–3.74)
UROBILINOGEN FLD QL: NEGATIVE MG/DL — SIGNIFICANT CHANGE UP
VARIANT LYMPHS # BLD: 5 % — SIGNIFICANT CHANGE UP (ref 0–6)
WBC # BLD: 11.8 K/UL — HIGH (ref 3.8–10.5)
WBC # FLD AUTO: 11.8 K/UL — HIGH (ref 3.8–10.5)

## 2017-06-29 PROCEDURE — 71010: CPT | Mod: 26,77

## 2017-06-29 PROCEDURE — 99292 CRITICAL CARE ADDL 30 MIN: CPT

## 2017-06-29 PROCEDURE — 71275 CT ANGIOGRAPHY CHEST: CPT | Mod: 26

## 2017-06-29 PROCEDURE — 70450 CT HEAD/BRAIN W/O DYE: CPT | Mod: 26

## 2017-06-29 PROCEDURE — 71010: CPT | Mod: 26

## 2017-06-29 PROCEDURE — 99231 SBSQ HOSP IP/OBS SF/LOW 25: CPT

## 2017-06-29 PROCEDURE — 99291 CRITICAL CARE FIRST HOUR: CPT

## 2017-06-29 RX ORDER — PROPOFOL 10 MG/ML
5 INJECTION, EMULSION INTRAVENOUS
Qty: 1000 | Refills: 0 | Status: DISCONTINUED | OUTPATIENT
Start: 2017-06-29 | End: 2017-06-29

## 2017-06-29 RX ORDER — PIPERACILLIN AND TAZOBACTAM 4; .5 G/20ML; G/20ML
3.38 INJECTION, POWDER, LYOPHILIZED, FOR SOLUTION INTRAVENOUS EVERY 8 HOURS
Qty: 0 | Refills: 0 | Status: DISCONTINUED | OUTPATIENT
Start: 2017-06-29 | End: 2017-07-02

## 2017-06-29 RX ORDER — PANTOPRAZOLE SODIUM 20 MG/1
40 TABLET, DELAYED RELEASE ORAL DAILY
Qty: 0 | Refills: 0 | Status: DISCONTINUED | OUTPATIENT
Start: 2017-06-29 | End: 2017-07-02

## 2017-06-29 RX ORDER — VANCOMYCIN HCL 1 G
500 VIAL (EA) INTRAVENOUS EVERY 12 HOURS
Qty: 0 | Refills: 0 | Status: DISCONTINUED | OUTPATIENT
Start: 2017-06-29 | End: 2017-07-02

## 2017-06-29 RX ORDER — VANCOMYCIN HCL 1 G
1000 VIAL (EA) INTRAVENOUS ONCE
Qty: 0 | Refills: 0 | Status: COMPLETED | OUTPATIENT
Start: 2017-06-29 | End: 2017-06-29

## 2017-06-29 RX ORDER — ACETAMINOPHEN 500 MG
650 TABLET ORAL EVERY 6 HOURS
Qty: 0 | Refills: 0 | Status: DISCONTINUED | OUTPATIENT
Start: 2017-06-29 | End: 2017-07-07

## 2017-06-29 RX ORDER — HEPARIN SODIUM 5000 [USP'U]/ML
5000 INJECTION INTRAVENOUS; SUBCUTANEOUS EVERY 8 HOURS
Qty: 0 | Refills: 0 | Status: DISCONTINUED | OUTPATIENT
Start: 2017-06-29 | End: 2017-07-07

## 2017-06-29 RX ORDER — PROPOFOL 10 MG/ML
5 INJECTION, EMULSION INTRAVENOUS
Qty: 1000 | Refills: 0 | Status: DISCONTINUED | OUTPATIENT
Start: 2017-06-29 | End: 2017-06-30

## 2017-06-29 RX ORDER — AZITHROMYCIN 500 MG/1
500 TABLET, FILM COATED ORAL EVERY 24 HOURS
Qty: 0 | Refills: 0 | Status: DISCONTINUED | OUTPATIENT
Start: 2017-06-29 | End: 2017-07-02

## 2017-06-29 RX ORDER — VANCOMYCIN HCL 1 G
1000 VIAL (EA) INTRAVENOUS EVERY 12 HOURS
Qty: 0 | Refills: 0 | Status: DISCONTINUED | OUTPATIENT
Start: 2017-06-29 | End: 2017-06-29

## 2017-06-29 RX ORDER — ALBUTEROL 90 UG/1
2.5 AEROSOL, METERED ORAL EVERY 6 HOURS
Qty: 0 | Refills: 0 | Status: DISCONTINUED | OUTPATIENT
Start: 2017-06-29 | End: 2017-07-07

## 2017-06-29 RX ORDER — SENNA PLUS 8.6 MG/1
15 TABLET ORAL
Qty: 0 | Refills: 0 | Status: DISCONTINUED | OUTPATIENT
Start: 2017-06-29 | End: 2017-07-02

## 2017-06-29 RX ORDER — SODIUM CHLORIDE 9 MG/ML
1000 INJECTION INTRAMUSCULAR; INTRAVENOUS; SUBCUTANEOUS ONCE
Qty: 0 | Refills: 0 | Status: COMPLETED | OUTPATIENT
Start: 2017-06-29 | End: 2017-06-29

## 2017-06-29 RX ORDER — SODIUM CHLORIDE 9 MG/ML
1500 INJECTION INTRAMUSCULAR; INTRAVENOUS; SUBCUTANEOUS ONCE
Qty: 0 | Refills: 0 | Status: COMPLETED | OUTPATIENT
Start: 2017-06-29 | End: 2017-06-29

## 2017-06-29 RX ORDER — PIPERACILLIN AND TAZOBACTAM 4; .5 G/20ML; G/20ML
3.38 INJECTION, POWDER, LYOPHILIZED, FOR SOLUTION INTRAVENOUS ONCE
Qty: 0 | Refills: 0 | Status: COMPLETED | OUTPATIENT
Start: 2017-06-29 | End: 2017-06-29

## 2017-06-29 RX ORDER — ALBUTEROL 90 UG/1
2.5 AEROSOL, METERED ORAL EVERY 6 HOURS
Qty: 0 | Refills: 0 | Status: DISCONTINUED | OUTPATIENT
Start: 2017-06-29 | End: 2017-06-29

## 2017-06-29 RX ADMIN — PIPERACILLIN AND TAZOBACTAM 25 GRAM(S): 4; .5 INJECTION, POWDER, LYOPHILIZED, FOR SOLUTION INTRAVENOUS at 22:44

## 2017-06-29 RX ADMIN — PROPOFOL 2.1 MICROGRAM(S)/KG/MIN: 10 INJECTION, EMULSION INTRAVENOUS at 13:49

## 2017-06-29 RX ADMIN — Medication 1 ENEMA: at 22:45

## 2017-06-29 RX ADMIN — PANTOPRAZOLE SODIUM 40 MILLIGRAM(S): 20 TABLET, DELAYED RELEASE ORAL at 17:44

## 2017-06-29 RX ADMIN — SODIUM CHLORIDE 1500 MILLILITER(S): 9 INJECTION INTRAMUSCULAR; INTRAVENOUS; SUBCUTANEOUS at 11:20

## 2017-06-29 RX ADMIN — Medication 650 MILLIGRAM(S): at 20:06

## 2017-06-29 RX ADMIN — ALBUTEROL 2.5 MILLIGRAM(S): 90 AEROSOL, METERED ORAL at 23:15

## 2017-06-29 RX ADMIN — ALBUTEROL 2.5 MILLIGRAM(S): 90 AEROSOL, METERED ORAL at 18:23

## 2017-06-29 RX ADMIN — Medication 40 MILLIGRAM(S): at 17:44

## 2017-06-29 RX ADMIN — Medication 100 MILLIGRAM(S): at 22:45

## 2017-06-29 RX ADMIN — Medication 250 MILLIGRAM(S): at 13:48

## 2017-06-29 RX ADMIN — AZITHROMYCIN 255 MILLIGRAM(S): 500 TABLET, FILM COATED ORAL at 17:44

## 2017-06-29 RX ADMIN — HEPARIN SODIUM 5000 UNIT(S): 5000 INJECTION INTRAVENOUS; SUBCUTANEOUS at 22:44

## 2017-06-29 RX ADMIN — SODIUM CHLORIDE 1000 MILLILITER(S): 9 INJECTION INTRAMUSCULAR; INTRAVENOUS; SUBCUTANEOUS at 10:00

## 2017-06-29 RX ADMIN — PIPERACILLIN AND TAZOBACTAM 200 GRAM(S): 4; .5 INJECTION, POWDER, LYOPHILIZED, FOR SOLUTION INTRAVENOUS at 11:20

## 2017-06-29 RX ADMIN — SENNA PLUS 15 MILLILITER(S): 8.6 TABLET ORAL at 22:45

## 2017-06-29 NOTE — ED ADULT NURSE NOTE - OBJECTIVE STATEMENT
patient received, no family at bedside, BIBA intubated in field, found unresponsive at home by aide. unable to gather medical history at this time.

## 2017-06-29 NOTE — ED ADULT NURSE REASSESSMENT NOTE - NS ED NURSE REASSESS COMMENT FT1
during ct scan, patient became esdras in the high and low 30s, difficult to arouse, RRT called. v/s stable at this time HR 71, 02 100%, 12 RR, /81. fluids, antibiotic running

## 2017-06-29 NOTE — H&P ADULT - NSHPREVIEWOFSYSTEMS_GEN_ALL_CORE
REVIEW OF SYSTEMS      General:	cachectic    Skin/Breast: no complaints  	    ENMT:	    Respiratory and Thorax: h/o intubation, cough, productive cough  	  Genitourinary: denies

## 2017-06-29 NOTE — ED PROVIDER NOTE - OBJECTIVE STATEMENT
Pt is a 89 yo lady with a pmhx of dementia, alzheimers, HTN, prior hx of aspiration pna and PE who presents to the ED with AMS and sob. Aide called EMS for tremor like activity and wet and coarse breathing. Pt found to have coarse lung sounds and sputum and was intubated in field for airway protection. No recent complaints of cough, abd pain, dysuria or headache. Pt in ED with breath sounds equal bilaterally, satting 100%. Moving all 4 extremities. No last known normal.

## 2017-06-29 NOTE — ED PROVIDER NOTE - PROGRESS NOTE DETAILS
Pt had CT head for negative acute changes. Pt admitted to ICU who has been updated during her stay in ED.

## 2017-06-29 NOTE — H&P ADULT - NSHPLABSRESULTS_GEN_ALL_CORE
06-29    144  |  104  |  7   ----------------------------<  127<H>  4.3   |  34<H>  |  0.85    Ca    8.6      29 Jun 2017 10:15    TPro  8.2  /  Alb  3.0<L>  /  TBili  0.4  /  DBili  x   /  AST  26  /  ALT  19  /  AlkPhos  121<H>  06-29

## 2017-06-29 NOTE — ED PROVIDER NOTE - PHYSICAL EXAMINATION
Gen: Intubated, not yet sedated on arrival, moving all 4 extremities  HEENT: Normocephalic, atraumatic  Card: RRR, no murmurs rubs or gallops  Resp: Equal breath sounds bilaterally, coarse  GI: Nondistended, no obvious tenderness  Ext: No edema, IO placed from EMS  Neuro: Moving extremities, but intubated.

## 2017-06-29 NOTE — ED ADULT TRIAGE NOTE - CHIEF COMPLAINT QUOTE
unresponsive from home unknown onset. Per EMS pt had trouble breathing at home with gurgling sound, pt intubated with ET 7.0 right leg I/O in placed

## 2017-06-29 NOTE — H&P ADULT - HISTORY OF PRESENT ILLNESS
89 Y/O female PMHx of Dementia, PE, chair bound at home with 24 hour aide BIBEMS for decreasing O2 saturation going from 2LNC to 4LNC at home. While in ED, pt was documented has having increasing CO2 levels while on BIPAP. ICU called for further evaluation. Pt is a 89 yo lady with a pmhx of dementia, alzheimers, chronic COPD, acute respiratory failure HTN, prior hx of aspiration pna and PE who presents to the ED with AMS and sob. Aide called EMS for tremor like activity and wet and coarse breathing. Pt found to have coarse lung sounds and sputum and was intubated in field for airway protection. No recent complaints of cough, abd pain, dysuria or headache. Pt in ED with breath sounds equal bilaterally, O2 sat 100%. Moving all 4 extremities.  Transferred to CCU

## 2017-06-29 NOTE — ED PROVIDER NOTE - CRITICAL CARE PROVIDED
conducted a detailed discussion of DNR status/direct patient care (not related to procedure)/interpretation of diagnostic studies/consultation with other physicians

## 2017-06-29 NOTE — ED ADULT NURSE REASSESSMENT NOTE - NS ED NURSE REASSESS COMMENT FT1
lost iv line during ct scan, patient is very hard stick, unable to get another line at this time, brought back to main treatment room, will try to get access line.

## 2017-06-29 NOTE — ED PROVIDER NOTE - MEDICAL DECISION MAKING DETAILS
Ddx: AMS/ Respiratory distress/ ICH/ Aspiration pna, pe given remote hx  Plan: CT head, labs, fluids, abx, ICU consult and admit

## 2017-06-29 NOTE — CONSULT NOTE ADULT - ASSESSMENT
89 Y/O female PMHx of Dementia, PE, chair bound at home with 24 hour aide BIBEMS for decreasing O2 saturation going from 2LNC to 4LNC at home. While in ED, pt was documented has having increasing CO2, respiratory failure and intubated.

## 2017-06-29 NOTE — H&P ADULT - NSHPPHYSICALEXAM_GEN_ALL_CORE
PHYSICAL EXAM:      Constitutional: sedated      ENMT: orally intubated    Neck: supple      Respiratory: prolonged expiratory phase, bilateral breath sounds    Cardiovascular: s1/S2 HR 65    Gastrointestinal: distended, hypoactive bowel sounds    Genitourinary: wren    Rectal: no lesions

## 2017-06-30 DIAGNOSIS — J18.9 PNEUMONIA, UNSPECIFIED ORGANISM: ICD-10-CM

## 2017-06-30 DIAGNOSIS — J96.01 ACUTE RESPIRATORY FAILURE WITH HYPOXIA: ICD-10-CM

## 2017-06-30 LAB
ALBUMIN SERPL ELPH-MCNC: 2.4 G/DL — LOW (ref 3.3–5)
ALP SERPL-CCNC: 88 U/L — SIGNIFICANT CHANGE UP (ref 40–120)
ALT FLD-CCNC: 21 U/L — SIGNIFICANT CHANGE UP (ref 12–78)
ANION GAP SERPL CALC-SCNC: 10 MMOL/L — SIGNIFICANT CHANGE UP (ref 5–17)
AST SERPL-CCNC: 40 U/L — HIGH (ref 15–37)
BASE EXCESS BLDA CALC-SCNC: 2.7 MMOL/L — HIGH (ref -2–2)
BILIRUB SERPL-MCNC: 0.9 MG/DL — SIGNIFICANT CHANGE UP (ref 0.2–1.2)
BLOOD GAS COMMENTS: SIGNIFICANT CHANGE UP
BLOOD GAS COMMENTS: SIGNIFICANT CHANGE UP
BLOOD GAS SOURCE: SIGNIFICANT CHANGE UP
BUN SERPL-MCNC: 12 MG/DL — SIGNIFICANT CHANGE UP (ref 7–23)
CALCIUM SERPL-MCNC: 7.6 MG/DL — LOW (ref 8.5–10.1)
CHLORIDE SERPL-SCNC: 107 MMOL/L — SIGNIFICANT CHANGE UP (ref 96–108)
CO2 SERPL-SCNC: 25 MMOL/L — SIGNIFICANT CHANGE UP (ref 22–31)
CREAT SERPL-MCNC: 1.09 MG/DL — SIGNIFICANT CHANGE UP (ref 0.5–1.3)
CULTURE RESULTS: NO GROWTH — SIGNIFICANT CHANGE UP
GLUCOSE SERPL-MCNC: 156 MG/DL — HIGH (ref 70–99)
GRAM STN FLD: SIGNIFICANT CHANGE UP
HCO3 BLDA-SCNC: 24 MMOL/L — SIGNIFICANT CHANGE UP (ref 21–29)
HCT VFR BLD CALC: 32.2 % — LOW (ref 34.5–45)
HGB BLD-MCNC: 10.5 G/DL — LOW (ref 11.5–15.5)
HOROWITZ INDEX BLDA+IHG-RTO: 35 — SIGNIFICANT CHANGE UP
LEGIONELLA AG UR QL: NEGATIVE — SIGNIFICANT CHANGE UP
MAGNESIUM SERPL-MCNC: 1.5 MG/DL — LOW (ref 1.6–2.6)
MCHC RBC-ENTMCNC: 28 PG — SIGNIFICANT CHANGE UP (ref 27–34)
MCHC RBC-ENTMCNC: 32.6 GM/DL — SIGNIFICANT CHANGE UP (ref 32–36)
MCV RBC AUTO: 85.9 FL — SIGNIFICANT CHANGE UP (ref 80–100)
PCO2 BLDA: 28 MMHG — LOW (ref 32–46)
PH BLD: 7.55 — HIGH (ref 7.35–7.45)
PHOSPHATE SERPL-MCNC: 1.1 MG/DL — LOW (ref 2.5–4.5)
PLATELET # BLD AUTO: 179 K/UL — SIGNIFICANT CHANGE UP (ref 150–400)
PO2 BLDA: 103 MMHG — SIGNIFICANT CHANGE UP (ref 74–108)
POTASSIUM SERPL-MCNC: 3.7 MMOL/L — SIGNIFICANT CHANGE UP (ref 3.5–5.3)
POTASSIUM SERPL-SCNC: 3.7 MMOL/L — SIGNIFICANT CHANGE UP (ref 3.5–5.3)
PROT SERPL-MCNC: 6.2 GM/DL — SIGNIFICANT CHANGE UP (ref 6–8.3)
RBC # BLD: 3.75 M/UL — LOW (ref 3.8–5.2)
RBC # FLD: 17 % — HIGH (ref 11–15)
SAO2 % BLDA: 98 % — HIGH (ref 92–96)
SODIUM SERPL-SCNC: 142 MMOL/L — SIGNIFICANT CHANGE UP (ref 135–145)
SPECIMEN SOURCE: SIGNIFICANT CHANGE UP
SPECIMEN SOURCE: SIGNIFICANT CHANGE UP
WBC # BLD: 21 K/UL — HIGH (ref 3.8–10.5)
WBC # FLD AUTO: 21 K/UL — HIGH (ref 3.8–10.5)

## 2017-06-30 PROCEDURE — 71010: CPT | Mod: 26

## 2017-06-30 PROCEDURE — 71010: CPT | Mod: 26,77

## 2017-06-30 RX ORDER — ACETAMINOPHEN 500 MG
650 TABLET ORAL ONCE
Qty: 0 | Refills: 0 | Status: COMPLETED | OUTPATIENT
Start: 2017-06-30 | End: 2017-06-30

## 2017-06-30 RX ORDER — FENTANYL CITRATE 50 UG/ML
1 INJECTION INTRAVENOUS
Qty: 2500 | Refills: 0 | Status: DISCONTINUED | OUTPATIENT
Start: 2017-06-30 | End: 2017-07-01

## 2017-06-30 RX ORDER — SODIUM CHLORIDE 9 MG/ML
1000 INJECTION INTRAMUSCULAR; INTRAVENOUS; SUBCUTANEOUS
Qty: 0 | Refills: 0 | Status: DISCONTINUED | OUTPATIENT
Start: 2017-06-30 | End: 2017-07-01

## 2017-06-30 RX ORDER — POTASSIUM PHOSPHATE, MONOBASIC POTASSIUM PHOSPHATE, DIBASIC 236; 224 MG/ML; MG/ML
15 INJECTION, SOLUTION INTRAVENOUS ONCE
Qty: 0 | Refills: 0 | Status: COMPLETED | OUTPATIENT
Start: 2017-06-30 | End: 2017-06-30

## 2017-06-30 RX ORDER — ACETAMINOPHEN 500 MG
650 TABLET ORAL ONCE
Qty: 0 | Refills: 0 | Status: DISCONTINUED | OUTPATIENT
Start: 2017-06-30 | End: 2017-06-30

## 2017-06-30 RX ORDER — MAGNESIUM SULFATE 500 MG/ML
2 VIAL (ML) INJECTION ONCE
Qty: 0 | Refills: 0 | Status: COMPLETED | OUTPATIENT
Start: 2017-06-30 | End: 2017-06-30

## 2017-06-30 RX ADMIN — Medication 40 MILLIGRAM(S): at 17:20

## 2017-06-30 RX ADMIN — HEPARIN SODIUM 5000 UNIT(S): 5000 INJECTION INTRAVENOUS; SUBCUTANEOUS at 21:09

## 2017-06-30 RX ADMIN — SODIUM CHLORIDE 75 MILLILITER(S): 9 INJECTION INTRAMUSCULAR; INTRAVENOUS; SUBCUTANEOUS at 00:27

## 2017-06-30 RX ADMIN — HEPARIN SODIUM 5000 UNIT(S): 5000 INJECTION INTRAVENOUS; SUBCUTANEOUS at 15:05

## 2017-06-30 RX ADMIN — ALBUTEROL 2.5 MILLIGRAM(S): 90 AEROSOL, METERED ORAL at 17:22

## 2017-06-30 RX ADMIN — FENTANYL CITRATE 6.9 MICROGRAM(S)/KG/HR: 50 INJECTION INTRAVENOUS at 17:06

## 2017-06-30 RX ADMIN — PROPOFOL 2.1 MICROGRAM(S)/KG/MIN: 10 INJECTION, EMULSION INTRAVENOUS at 01:08

## 2017-06-30 RX ADMIN — Medication 650 MILLIGRAM(S): at 00:25

## 2017-06-30 RX ADMIN — SENNA PLUS 15 MILLILITER(S): 8.6 TABLET ORAL at 05:11

## 2017-06-30 RX ADMIN — HEPARIN SODIUM 5000 UNIT(S): 5000 INJECTION INTRAVENOUS; SUBCUTANEOUS at 05:11

## 2017-06-30 RX ADMIN — SENNA PLUS 15 MILLILITER(S): 8.6 TABLET ORAL at 17:20

## 2017-06-30 RX ADMIN — Medication 50 GRAM(S): at 05:08

## 2017-06-30 RX ADMIN — Medication 100 MILLIGRAM(S): at 11:16

## 2017-06-30 RX ADMIN — PANTOPRAZOLE SODIUM 40 MILLIGRAM(S): 20 TABLET, DELAYED RELEASE ORAL at 11:27

## 2017-06-30 RX ADMIN — SODIUM CHLORIDE 75 MILLILITER(S): 9 INJECTION INTRAMUSCULAR; INTRAVENOUS; SUBCUTANEOUS at 12:34

## 2017-06-30 RX ADMIN — PROPOFOL 2.1 MICROGRAM(S)/KG/MIN: 10 INJECTION, EMULSION INTRAVENOUS at 10:34

## 2017-06-30 RX ADMIN — AZITHROMYCIN 255 MILLIGRAM(S): 500 TABLET, FILM COATED ORAL at 17:06

## 2017-06-30 RX ADMIN — ALBUTEROL 2.5 MILLIGRAM(S): 90 AEROSOL, METERED ORAL at 11:08

## 2017-06-30 RX ADMIN — PIPERACILLIN AND TAZOBACTAM 25 GRAM(S): 4; .5 INJECTION, POWDER, LYOPHILIZED, FOR SOLUTION INTRAVENOUS at 15:05

## 2017-06-30 RX ADMIN — POTASSIUM PHOSPHATE, MONOBASIC POTASSIUM PHOSPHATE, DIBASIC 63.75 MILLIMOLE(S): 236; 224 INJECTION, SOLUTION INTRAVENOUS at 05:40

## 2017-06-30 RX ADMIN — ALBUTEROL 2.5 MILLIGRAM(S): 90 AEROSOL, METERED ORAL at 05:57

## 2017-06-30 RX ADMIN — Medication 100 MILLIGRAM(S): at 22:38

## 2017-06-30 RX ADMIN — Medication 40 MILLIGRAM(S): at 05:09

## 2017-06-30 RX ADMIN — PIPERACILLIN AND TAZOBACTAM 25 GRAM(S): 4; .5 INJECTION, POWDER, LYOPHILIZED, FOR SOLUTION INTRAVENOUS at 21:09

## 2017-06-30 RX ADMIN — PIPERACILLIN AND TAZOBACTAM 25 GRAM(S): 4; .5 INJECTION, POWDER, LYOPHILIZED, FOR SOLUTION INTRAVENOUS at 05:40

## 2017-06-30 NOTE — PROGRESS NOTE ADULT - PROBLEM SELECTOR PLAN 1
-Patient currently on Full vent support  -titrate settings to maintain SaO2 >90%, or pH >7.25  -consider low titdal volume ventilation strategy w/ goal Tv 4-6 cc/kg of ideal body   weight  -plateu pressure goal <30  -Peridex oral care and VAP prophylaxis with HOB 30 degrees   -aggressive chest PT and suctioning   -daily sedation vacation with spontaneous breathing trial and ABG in [facility] if clinical condition warrants, discuss with respiratory therapy  continue Solumderol 40 Q 12

## 2017-06-30 NOTE — PROGRESS NOTE ADULT - SUBJECTIVE AND OBJECTIVE BOX
Patient is a 89y old  Female who presents with a chief complaint of increasing O2 demands (2017 21:27)      INTERVAL HPI/OVERNIGHT EVENTS:  pt remain intubated  MEDICATIONS  (STANDING):  propofol Infusion 5 MICROgram(s)/kG/Min (2.1 mL/Hr) IV Continuous <Continuous>  methylPREDNISolone sodium succinate Injectable 40 milliGRAM(s) IV Push every 12 hours  piperacillin/tazobactam IVPB. 3.375 Gram(s) IV Intermittent every 8 hours  azithromycin  IVPB 500 milliGRAM(s) IV Intermittent every 24 hours  heparin  Injectable 5000 Unit(s) SubCutaneous every 8 hours  pantoprazole  Injectable 40 milliGRAM(s) IV Push daily  ALBUTerol    0.083% 2.5 milliGRAM(s) Nebulizer every 6 hours  vancomycin  IVPB 500 milliGRAM(s) IV Intermittent every 12 hours  senna Syrup 15 milliLiter(s) Oral two times a day  sodium chloride 0.9%. 1000 milliLiter(s) (75 mL/Hr) IV Continuous <Continuous>    MEDICATIONS  (PRN):  acetaminophen   Tablet 650 milliGRAM(s) Oral every 6 hours PRN For Temp greater than 38 C (100.4 F)      Allergies    Haldol (Unknown)    Intolerances        REVIEW OF SYSTEMS:  CONSTITUTIONAL: No fever, weight loss, or fatigue  EYES: No eye pain, visual disturbances, or discharge  ENMT:  No difficulty hearing, tinnitus, vertigo; No sinus or throat pain  NECK: No pain or stiffness  BREASTS: No pain, masses, or nipple discharge  RESPIRATORY: No cough, wheezing, chills or hemoptysis; No shortness of breath  CARDIOVASCULAR: No chest pain, palpitations, dizziness, or leg swelling  GASTROINTESTINAL: No abdominal or epigastric pain. No nausea, vomiting, or hematemesis; No diarrhea or constipation. No melena or hematochezia.  GENITOURINARY: No dysuria, frequency, hematuria, or incontinence  NEUROLOGICAL: No headaches, memory loss, loss of strength, numbness, or tremors  SKIN: No itching, burning, rashes, or lesions   LYMPH NODES: No enlarged glands  ENDOCRINE: No heat or cold intolerance; No hair loss  MUSCULOSKELETAL: No joint pain or swelling; No muscle, back, or extremity pain  PSYCHIATRIC: No depression, anxiety, mood swings, or difficulty sleeping  HEME/LYMPH: No easy bruising, or bleeding gums  ALLERGY AND IMMUNOLOGIC: No hives or eczema    Vital Signs Last 24 Hrs  T(C): 37.3 (2017 04:00), Max: 38.7 (2017 17:01)  T(F): 99.1 (2017 04:00), Max: 101.6 (2017 17:01)  HR: 65 (2017 11:22) (46 - 109)  BP: 142/71 (2017 11:00) (55/45 - 184/128)  BP(mean): 86 (2017 11:00) (48 - 144)  RR: 13 (2017 11:00) (13 - 31)  SpO2: 99% (2017 11:22) (75% - 100%)    PHYSICAL EXAM:  GENERAL: NAD, well-groomed, well-developed  HEAD:  Atraumatic, Normocephalic  EYES: EOMI, PERRLA, conjunctiva and sclera clear  ENMT: No tonsillar erythema, exudates, or enlargement; Moist mucous membranes, Good dentition, No lesions  NECK: Supple, No JVD, Normal thyroid  NERVOUS SYSTEM:  Alert & Oriented X3, Good concentration; Motor Strength 5/5 B/L upper and lower extremities; DTRs 2+ intact and symmetric  CHEST/LUNG: Clear to percussion bilaterally; No rales, rhonchi, wheezing, or rubs  HEART: Regular rate and rhythm; No murmurs, rubs, or gallops  ABDOMEN: Soft, Nontender, Nondistended; Bowel sounds present  EXTREMITIES:  2+ Peripheral Pulses, No clubbing, cyanosis, or edema  LYMPH: No lymphadenopathy noted  SKIN: No rashes or lesions    LABS:                        10.5   21.0  )-----------( 179      ( 2017 03:51 )             32.2     06-30    142  |  107  |  12  ----------------------------<  156<H>  3.7   |  25  |  1.09    Ca    7.6<L>      2017 03:51  Phos  1.1     06-30  Mg     1.5     06-30    TPro  6.2  /  Alb  2.4<L>  /  TBili  0.9  /  DBili  x   /  AST  40<H>  /  ALT  21  /  AlkPhos  88  06-30    PT/INR - ( 2017 10:15 )   PT: 10.4 sec;   INR: 0.96 ratio         PTT - ( 2017 10:15 )  PTT:33.4 sec  Urinalysis Basic - ( 2017 11:18 )    Color: Yellow / Appearance: Clear / S.010 / pH: x  Gluc: x / Ketone: Negative  / Bili: Negative / Urobili: Negative mg/dL   Blood: x / Protein: 30 mg/dL / Nitrite: Negative   Leuk Esterase: Negative / RBC: 3-5 /HPF / WBC x   Sq Epi: x / Non Sq Epi: x / Bacteria: x      CAPILLARY BLOOD GLUCOSE        CULTURES:    HEMOGLOBIN A1C:    CHOLESTEROL:    ABG - ( 2017 07:52 )  pH: x     /  pCO2: 28    /  pO2: 103   / HCO3: 24    / Base Excess: 2.7   /  SaO2: 98                  RADIOLOGY & ADDITIONAL TESTS:

## 2017-06-30 NOTE — PROGRESS NOTE ADULT - ASSESSMENT
89 f end stage COPD on home o2, pulm fibrosis , Jesse on Bipap at home a/w resp failre and aspiration PNA

## 2017-06-30 NOTE — PROGRESS NOTE ADULT - PROBLEM SELECTOR PLAN 1
C/W isatu Tellez Zithromax, F.U Cx, Solumderol 40 Q 12 H  ALkalemic on board ABG and decrease rate to 14 from 20 and can use fentayl to sedate  HAs been esdras with higher doses of prop  start feeds.

## 2017-06-30 NOTE — PROGRESS NOTE ADULT - ASSESSMENT
89 year old female with acute hypoxic respiratoy failure secondary to right lower lobe pneumonia. Sedation changed from propofol to fentanyl due to bradycardia, will monitor

## 2017-06-30 NOTE — DIETITIAN INITIAL EVALUATION ADULT. - PERTINENT LABORATORY DATA
06-30 Na 142 mmol/L Glu 156 mg/dL<H> K+ 3.7 mmol/L Cr  1.09 mg/dL BUN 12 mg/dL Phos 1.1 mg/dL<L> Alb 2.4 g/dL<L> PAB n/a   Hgb 10.5 g/dL<L> Hct 32.2 %<L>, WBC=21

## 2017-06-30 NOTE — DIETITIAN INITIAL EVALUATION ADULT. - PHYSICAL APPEARANCE
overweight/MI=29.4; +1 generalized edema; Nutrition focus physical exam conducted & pt absent of physical signs of malnutrition

## 2017-06-30 NOTE — PROGRESS NOTE ADULT - SUBJECTIVE AND OBJECTIVE BOX
INTERVAL HPI/OVERNIGHT EVENTS:   HPI:  Pt is a 89 yo lady with a pmhx of dementia, alzheimers, chronic COPD, acute respiratory failure HTN, prior hx of aspiration pna and PE who presents to the ED with AMS and sob. Aide called EMS for tremor like activity and wet and coarse breathing. Pt found to have coarse lung sounds and sputum and was intubated in field for airway protection. No recent complaints of cough, abd pain, dysuria or headache. Pt in ED with breath sounds equal bilaterally, O2 sat 100%. Moving all 4 extremities.  Transferred to CCU (2017 21:27)    Developing RLL inflitrate on CXR, remains intubated           PAST MEDICAL & SURGICAL HISTORY:  UTI (urinary tract infection)  PE (pulmonary thromboembolism)  Ovarian cancer  Alzheimer's Dementia with Behavioral Disturbance  Benign Essential Hypertension  H/O aortic valve replacement  Sigmoidoscopy Exam - Negative  S/P [facility] Biopsy of Cervix with vag laceration repair  S/P D&C, operative hysteroscopy      REVIEW OF SYSTEMS:     fevers, no arrythmia, no       ICU Vital Signs Last 24 Hrs  T(C): 37.3 (2017 04:00), Max: 38.7 (2017 17:01)  T(F): 99.1 (2017 04:00), Max: 101.6 (2017 17:01)  HR: 51 (2017 09:00) (46 - 109)  BP: 120/67 (2017 09:00) (55/45 - 189/90)  BP(mean): 80 (2017 09:00) (48 - 144)  ABP: --  ABP(mean): --  RR: 20 (2017 09:00) (11 - 31)  SpO2: 99% (2017 09:00) (75% - 100%)      ABG - ( 2017 07:52 )  pH: x     /  pCO2: 28    /  pO2: 103   / HCO3: 24    / Base Excess: 2.7   /  SaO2: 98                  I&O's Detail    2017 07:01  -  2017 07:00  --------------------------------------------------------  IN:    IV PiggyBack: 250 mL    propofol Infusion: 132.8 mL    Sodium Chloride 0.9% IV Bolus: 2500 mL    sodium chloride 0.9%.: 600 mL    Solution: 100 mL    Solution: 50 mL    Solution: 200 mL    Solution: 250 mL  Total IN: 4082.8 mL    OUT:    Indwelling Catheter - Urethral: 695 mL  Total OUT: 695 mL    Total NET: 3387.8 mL      2017 07:01  -  2017 09:44  --------------------------------------------------------  IN:    propofol Infusion: 16.6 mL    sodium chloride 0.9%.: 150 mL  Total IN: 166.6 mL    OUT:    Indwelling Catheter - Urethral: 150 mL  Total OUT: 150 mL    Total NET: 16.6 mL          Mode: AC/ CMV (Assist Control/ Continuous Mandatory Ventilation)  RR (machine): 14  TV (machine): 450  FiO2: 35  PEEP: 5  ITime: 1  MAP: 15  PIP: 40    CAPILLARY BLOOD GLUCOSE  128 (2017 10:17)        PHYSICAL EXAM:     Gen intubated/sedated  Lungs Scattered rhinchi  CVS S1, S2 RRR  Abd NT/ND  EXt edema    LABS:                        10.5   21.0  )-----------( 179      ( 2017 03:51 )             32.2      06-30    142  |  107  |  12  ----------------------------<  156<H>  3.7   |  25  |  1.09    Ca    7.6<L>      2017 03:51  Phos  1.1     06-30  Mg     1.5     06-30    TPro  6.2  /  Alb  2.4<L>  /  TBili  0.9  /  DBili  x   /  AST  40<H>  /  ALT  21  /  AlkPhos  88  06-30    PT/INR - ( 2017 10:15 )   PT: 10.4 sec;   INR: 0.96 ratio         PTT - ( 2017 10:15 )  PTT:33.4 sec  Urinalysis Basic - ( 2017 11:18 )    Color: Yellow / Appearance: Clear / S.010 / pH: x  Gluc: x / Ketone: Negative  / Bili: Negative / Urobili: Negative mg/dL   Blood: x / Protein: 30 mg/dL / Nitrite: Negative   Leuk Esterase: Negative / RBC: 3-5 /HPF / WBC x   Sq Epi: x / Non Sq Epi: x / Bacteria: x          RADIOLOGY & ADDITIONAL STUDIES:  IMPRESSION:     Nondiagnostic for evaluation of pulmonary arteries.  Emphysema.      CT head negative   CXR ETT ok   RLL infiltrate      Assessment and Plan:    CRITICAL CARE TIME SPENT:

## 2017-06-30 NOTE — DIETITIAN INITIAL EVALUATION ADULT. - OTHER INFO
Pt seen for RN consult 6/29 for failure to thrive & CCU admission. Pt sedated on mech vent & unable to obtain wt & diet hx. Last BM x 1(6/30); hypoactive bowels. Pt receiving Propofol=146kcal /24hr.

## 2017-06-30 NOTE — PROGRESS NOTE ADULT - SUBJECTIVE AND OBJECTIVE BOX
89y  Female  Haldol (Unknown)    Patient is a 89y old  Female who presented with a chief complaint hypoxia     HPI:  Pt is a 89 y/o female pmhx of dementia, alzheimers, chronic COPD on home oxygen, pulmonary fibrosis , obstructive sleep apnea on Bipap, acute respiratory failure from aspiration pna UTI and PE who presented to the ED with AMS and sob after being intubated in field for airway protection has been admitted for RLL pneumonia is currently intubated on mecahanical ventilation with daily weaning trials and being treated with antibiotics.   Tonight the patients ET tube had migrated to right mainstem bronchi by chest xray and was readjusted to 24 cm at the lip and 1-2 cm above the mathew on repeat xray     PAST MEDICAL & SURGICAL HISTORY:  UTI (urinary tract infection)  PE (pulmonary thromboembolism)  Ovarian cancer  Alzheimer's Dementia with Behavioral Disturbance  Benign Essential Hypertension  H/O aortic valve replacement  Sigmoidoscopy Exam - Negative  S/P Cone Biopsy of Cervix with vag laceration repair  S/P D&C, operative hysteroscopy    FAMILY HISTORY:  No pertinent family history in first degree relatives      Vitals   Vital Signs Last 24 Hrs  T(C): 37 (2017 20:30), Max: 38.5 (2017 23:47)  T(F): 98.6 (2017 20:30), Max: 101.3 (2017 23:47)  HR: 80 (2017 22:30) (42 - 109)  BP: 156/80 (2017 22:00) (87/53 - 178/129)  BP(mean): 95 (2017 22:00) (60 - 141)  RR: 15 (2017 22:30) (13 - 23)  SpO2: 97% (2017 22:30) (76% - 100%)  ABG - ( 2017 07:52 )  pH: x     /  pCO2: 28    /  pO2: 103   / HCO3: 24    / Base Excess: 2.7   /  SaO2: 98                I&O's Summary    2017 07:01  -  2017 07:00  --------------------------------------------------------  IN: 4082.8 mL / OUT: 695 mL / NET: 3387.8 mL    2017 07:01  -  2017 22:40  --------------------------------------------------------  IN: 1423.6 mL / OUT: 1610 mL / NET: -186.4 mL        LABS      142  |  107  |  12  ----------------------------<  156<H>  3.7   |  25  |  1.09    Ca    7.6<L>      2017 03:51  Phos  1.1       Mg     1.5         TPro  6.2  /  Alb  2.4<L>  /  TBili  0.9  /  DBili  x   /  AST  40<H>  /  ALT  21  /  AlkPhos  88                            10.5   21.0  )-----------( 179      ( 2017 03:51 )             32.2     PT/INR - ( 2017 10:15 )   PT: 10.4 sec;   INR: 0.96 ratio         PTT - ( 2017 10:15 )  PTT:33.4 sec  CARDIAC MARKERS ( 2017 10:15 )  <.015 ng/mL / x     / x     / x     / 0.8 ng/mL      LIVER FUNCTIONS - ( 2017 03:51 )  Alb: 2.4 g/dL / Pro: 6.2 gm/dL / ALK PHOS: 88 U/L / ALT: 21 U/L / AST: 40 U/L / GGT: x           CAPILLARY BLOOD GLUCOSE  128 (2017 10:17)        Urinalysis Basic - ( 2017 11:18 )    Color: Yellow / Appearance: Clear / S.010 / pH: x  Gluc: x / Ketone: Negative  / Bili: Negative / Urobili: Negative mg/dL   Blood: x / Protein: 30 mg/dL / Nitrite: Negative   Leuk Esterase: Negative / RBC: 3-5 /HPF / WBC x   Sq Epi: x / Non Sq Epi: x / Bacteria: x        VENT SETTINGS   Mode: AC/ CMV (Assist Control/ Continuous Mandatory Ventilation)  RR (machine): 14  TV (machine): 450  FiO2: 35  PEEP: 5  ITime: 0.9  MAP: 11  PIP: 30      Meds  MEDICATIONS  (STANDING):  methylPREDNISolone sodium succinate Injectable 40 milliGRAM(s) IV Push every 12 hours  piperacillin/tazobactam IVPB. 3.375 Gram(s) IV Intermittent every 8 hours  azithromycin  IVPB 500 milliGRAM(s) IV Intermittent every 24 hours  heparin  Injectable 5000 Unit(s) SubCutaneous every 8 hours  pantoprazole  Injectable 40 milliGRAM(s) IV Push daily  ALBUTerol    0.083% 2.5 milliGRAM(s) Nebulizer every 6 hours  vancomycin  IVPB 500 milliGRAM(s) IV Intermittent every 12 hours  senna Syrup 15 milliLiter(s) Oral two times a day  sodium chloride 0.9%. 1000 milliLiter(s) (75 mL/Hr) IV Continuous <Continuous>  fentaNYL   Infusion 1 MICROgram(s)/kG/Hr (6.9 mL/Hr) IV Continuous <Continuous>      REVIEW OF SYSTEMS:    unable to obtain full review of systems due to sedation and mechanical ventilation       Physicial Exam:     Constitutional: intubated and lightly sedated with fentynal  HEENT: PERRLA, no drainage or redness  Neck: No bruits; no thyromegaly or nodules,  No JVD  Respiratory: Breath Sounds equal & show fine crakels to  auscultation in right middle and lower lobes , no accessory muscle use  Cardiovascular: Regular rate & rhythm, normal S1, S2; no murmurs, gallops or rubs; no S3, S4  Gastrointestinal: Soft, non-tender, non distended no hepatosplenomegaly, normal bowel sounds  Extremities: No peripheral edema, No cyanosis, clubbing   Vascular: Equal and normal pulses: 2+ peripheral pulses throughout  Neurological: arousable on sedation, opens eyes and responds to pain no purposeful movent not following commands , normal reflexes  Psychiatric: Normal mood, normal affect  Musculoskeletal: No joint pain, swelling or deformity; no limitation of movement  Skin: No rashes

## 2017-07-01 LAB
ANION GAP SERPL CALC-SCNC: 8 MMOL/L — SIGNIFICANT CHANGE UP (ref 5–17)
BUN SERPL-MCNC: 16 MG/DL — SIGNIFICANT CHANGE UP (ref 7–23)
CALCIUM SERPL-MCNC: 7.7 MG/DL — LOW (ref 8.5–10.1)
CHLORIDE SERPL-SCNC: 107 MMOL/L — SIGNIFICANT CHANGE UP (ref 96–108)
CHOLEST SERPL-MCNC: 212 MG/DL — HIGH (ref 10–199)
CO2 SERPL-SCNC: 28 MMOL/L — SIGNIFICANT CHANGE UP (ref 22–31)
CREAT SERPL-MCNC: 0.84 MG/DL — SIGNIFICANT CHANGE UP (ref 0.5–1.3)
GLUCOSE SERPL-MCNC: 133 MG/DL — HIGH (ref 70–99)
HCT VFR BLD CALC: 29.1 % — LOW (ref 34.5–45)
HDLC SERPL-MCNC: 55 MG/DL — SIGNIFICANT CHANGE UP (ref 40–125)
HGB BLD-MCNC: 9.8 G/DL — LOW (ref 11.5–15.5)
LIPID PNL WITH DIRECT LDL SERPL: 137 MG/DL — HIGH
MAGNESIUM SERPL-MCNC: 2.3 MG/DL — SIGNIFICANT CHANGE UP (ref 1.6–2.6)
MCHC RBC-ENTMCNC: 29 PG — SIGNIFICANT CHANGE UP (ref 27–34)
MCHC RBC-ENTMCNC: 33.7 GM/DL — SIGNIFICANT CHANGE UP (ref 32–36)
MCV RBC AUTO: 85.9 FL — SIGNIFICANT CHANGE UP (ref 80–100)
PHOSPHATE SERPL-MCNC: 2.7 MG/DL — SIGNIFICANT CHANGE UP (ref 2.5–4.5)
PLATELET # BLD AUTO: 193 K/UL — SIGNIFICANT CHANGE UP (ref 150–400)
POTASSIUM SERPL-MCNC: 4.2 MMOL/L — SIGNIFICANT CHANGE UP (ref 3.5–5.3)
POTASSIUM SERPL-SCNC: 4.2 MMOL/L — SIGNIFICANT CHANGE UP (ref 3.5–5.3)
RBC # BLD: 3.39 M/UL — LOW (ref 3.8–5.2)
RBC # FLD: 17.5 % — HIGH (ref 11–15)
SODIUM SERPL-SCNC: 143 MMOL/L — SIGNIFICANT CHANGE UP (ref 135–145)
TOTAL CHOLESTEROL/HDL RATIO MEASUREMENT: 3.9 RATIO — SIGNIFICANT CHANGE UP (ref 3.3–7.1)
TRIGL SERPL-MCNC: 99 MG/DL — SIGNIFICANT CHANGE UP (ref 10–149)
VANCOMYCIN TROUGH SERPL-MCNC: 11.8 UG/ML — SIGNIFICANT CHANGE UP (ref 10–20)
WBC # BLD: 17.9 K/UL — HIGH (ref 3.8–10.5)
WBC # FLD AUTO: 17.9 K/UL — HIGH (ref 3.8–10.5)

## 2017-07-01 PROCEDURE — 71010: CPT | Mod: 26

## 2017-07-01 RX ORDER — QUETIAPINE FUMARATE 200 MG/1
12.5 TABLET, FILM COATED ORAL ONCE
Qty: 0 | Refills: 0 | Status: COMPLETED | OUTPATIENT
Start: 2017-07-01 | End: 2017-07-01

## 2017-07-01 RX ORDER — CHLORHEXIDINE GLUCONATE 213 G/1000ML
1 SOLUTION TOPICAL DAILY
Qty: 0 | Refills: 0 | Status: DISCONTINUED | OUTPATIENT
Start: 2017-07-01 | End: 2017-07-03

## 2017-07-01 RX ADMIN — PIPERACILLIN AND TAZOBACTAM 25 GRAM(S): 4; .5 INJECTION, POWDER, LYOPHILIZED, FOR SOLUTION INTRAVENOUS at 05:20

## 2017-07-01 RX ADMIN — PIPERACILLIN AND TAZOBACTAM 25 GRAM(S): 4; .5 INJECTION, POWDER, LYOPHILIZED, FOR SOLUTION INTRAVENOUS at 14:04

## 2017-07-01 RX ADMIN — HEPARIN SODIUM 5000 UNIT(S): 5000 INJECTION INTRAVENOUS; SUBCUTANEOUS at 14:03

## 2017-07-01 RX ADMIN — ALBUTEROL 2.5 MILLIGRAM(S): 90 AEROSOL, METERED ORAL at 00:32

## 2017-07-01 RX ADMIN — Medication 40 MILLIGRAM(S): at 17:07

## 2017-07-01 RX ADMIN — SENNA PLUS 15 MILLILITER(S): 8.6 TABLET ORAL at 17:08

## 2017-07-01 RX ADMIN — SODIUM CHLORIDE 75 MILLILITER(S): 9 INJECTION INTRAMUSCULAR; INTRAVENOUS; SUBCUTANEOUS at 03:00

## 2017-07-01 RX ADMIN — ALBUTEROL 2.5 MILLIGRAM(S): 90 AEROSOL, METERED ORAL at 05:34

## 2017-07-01 RX ADMIN — ALBUTEROL 2.5 MILLIGRAM(S): 90 AEROSOL, METERED ORAL at 17:12

## 2017-07-01 RX ADMIN — QUETIAPINE FUMARATE 12.5 MILLIGRAM(S): 200 TABLET, FILM COATED ORAL at 22:03

## 2017-07-01 RX ADMIN — HEPARIN SODIUM 5000 UNIT(S): 5000 INJECTION INTRAVENOUS; SUBCUTANEOUS at 05:20

## 2017-07-01 RX ADMIN — HEPARIN SODIUM 5000 UNIT(S): 5000 INJECTION INTRAVENOUS; SUBCUTANEOUS at 21:36

## 2017-07-01 RX ADMIN — AZITHROMYCIN 255 MILLIGRAM(S): 500 TABLET, FILM COATED ORAL at 17:07

## 2017-07-01 RX ADMIN — PIPERACILLIN AND TAZOBACTAM 25 GRAM(S): 4; .5 INJECTION, POWDER, LYOPHILIZED, FOR SOLUTION INTRAVENOUS at 21:36

## 2017-07-01 RX ADMIN — PANTOPRAZOLE SODIUM 40 MILLIGRAM(S): 20 TABLET, DELAYED RELEASE ORAL at 11:04

## 2017-07-01 RX ADMIN — Medication 40 MILLIGRAM(S): at 05:25

## 2017-07-01 RX ADMIN — Medication 100 MILLIGRAM(S): at 11:03

## 2017-07-01 RX ADMIN — Medication 100 MILLIGRAM(S): at 21:36

## 2017-07-01 RX ADMIN — CHLORHEXIDINE GLUCONATE 1 APPLICATION(S): 213 SOLUTION TOPICAL at 14:05

## 2017-07-01 RX ADMIN — SENNA PLUS 15 MILLILITER(S): 8.6 TABLET ORAL at 06:58

## 2017-07-01 RX ADMIN — ALBUTEROL 2.5 MILLIGRAM(S): 90 AEROSOL, METERED ORAL at 11:48

## 2017-07-01 NOTE — PROGRESS NOTE ADULT - SUBJECTIVE AND OBJECTIVE BOX
Patient is a 89y old  Female who presents with a chief complaint of increasing O2 demands (29 Jun 2017 21:27)      INTERVAL HPI/OVERNIGHT EVENTS:  pt remain intubated  MEDICATIONS  (STANDING):  methylPREDNISolone sodium succinate Injectable 40 milliGRAM(s) IV Push every 12 hours  piperacillin/tazobactam IVPB. 3.375 Gram(s) IV Intermittent every 8 hours  azithromycin  IVPB 500 milliGRAM(s) IV Intermittent every 24 hours  heparin  Injectable 5000 Unit(s) SubCutaneous every 8 hours  pantoprazole  Injectable 40 milliGRAM(s) IV Push daily  ALBUTerol    0.083% 2.5 milliGRAM(s) Nebulizer every 6 hours  vancomycin  IVPB 500 milliGRAM(s) IV Intermittent every 12 hours  senna Syrup 15 milliLiter(s) Oral two times a day  sodium chloride 0.9%. 1000 milliLiter(s) (75 mL/Hr) IV Continuous <Continuous>  chlorhexidine 4% Liquid 1 Application(s) Topical daily    MEDICATIONS  (PRN):  acetaminophen   Tablet 650 milliGRAM(s) Oral every 6 hours PRN For Temp greater than 38 C (100.4 F)      Allergies    Haldol (Unknown)    Intolerances        REVIEW OF SYSTEMS:  CONSTITUTIONAL: No fever, weight loss, or fatigue  EYES: No eye pain, visual disturbances, or discharge  ENMT:  No difficulty hearing, tinnitus, vertigo; No sinus or throat pain  NECK: No pain or stiffness  BREASTS: No pain, masses, or nipple discharge  RESPIRATORY: No cough, wheezing, chills or hemoptysis; No shortness of breath  CARDIOVASCULAR: No chest pain, palpitations, dizziness, or leg swelling  GASTROINTESTINAL: No abdominal or epigastric pain. No nausea, vomiting, or hematemesis; No diarrhea or constipation. No melena or hematochezia.  GENITOURINARY: No dysuria, frequency, hematuria, or incontinence  NEUROLOGICAL: No headaches, memory loss, loss of strength, numbness, or tremors  SKIN: No itching, burning, rashes, or lesions   LYMPH NODES: No enlarged glands  ENDOCRINE: No heat or cold intolerance; No hair loss  MUSCULOSKELETAL: No joint pain or swelling; No muscle, back, or extremity pain  PSYCHIATRIC: No depression, anxiety, mood swings, or difficulty sleeping  HEME/LYMPH: No easy bruising, or bleeding gums  ALLERGY AND IMMUNOLOGIC: No hives or eczema    Vital Signs Last 24 Hrs  T(C): 36.9 (01 Jul 2017 15:22), Max: 37 (30 Jun 2017 20:30)  T(F): 98.5 (01 Jul 2017 15:22), Max: 98.6 (30 Jun 2017 20:30)  HR: 57 (01 Jul 2017 14:00) (44 - 110)  BP: 153/82 (01 Jul 2017 14:00) (102/62 - 180/92)  BP(mean): 97 (01 Jul 2017 14:00) (60 - 116)  RR: 16 (01 Jul 2017 14:00) (11 - 22)  SpO2: 98% (01 Jul 2017 14:00) (89% - 100%)    PHYSICAL EXAM:  GENERAL: NAD, well-groomed, well-developed  HEAD:  Atraumatic, Normocephalic  EYES: EOMI, PERRLA, conjunctiva and sclera clear  ENMT: No tonsillar erythema, exudates, or enlargement; Moist mucous membranes, Good dentition, No lesions  NECK: Supple, No JVD, Normal thyroid  NERVOUS SYSTEM:  Alert & Oriented X3, Good concentration; Motor Strength 5/5 B/L upper and lower extremities; DTRs 2+ intact and symmetric  CHEST/LUNG: Clear to percussion bilaterally; No rales, rhonchi, wheezing, or rubs  HEART: Regular rate and rhythm; No murmurs, rubs, or gallops  ABDOMEN: Soft, Nontender, Nondistended; Bowel sounds present  EXTREMITIES:  2+ Peripheral Pulses, No clubbing, cyanosis, or edema  LYMPH: No lymphadenopathy noted  SKIN: No rashes or lesions    LABS:                        9.8    17.9  )-----------( 193      ( 01 Jul 2017 04:56 )             29.1     07-01    143  |  107  |  16  ----------------------------<  133<H>  4.2   |  28  |  0.84    Ca    7.7<L>      01 Jul 2017 04:56  Phos  2.7     07-01  Mg     2.3     07-01    TPro  6.2  /  Alb  2.4<L>  /  TBili  0.9  /  DBili  x   /  AST  40<H>  /  ALT  21  /  AlkPhos  88  06-30        CAPILLARY BLOOD GLUCOSE        CULTURES:    HEMOGLOBIN A1C:    CHOLESTEROL:  Cholesterol, Serum: 212 mg/dL (07-01 @ 09:23)  HDL Cholesterol, Serum: 55 mg/dL (07-01 @ 09:23)  Triglycerides, Serum: 99 mg/dL (07-01 @ 09:23)    ABG - ( 30 Jun 2017 07:52 )  pH: x     /  pCO2: 28    /  pO2: 103   / HCO3: 24    / Base Excess: 2.7   /  SaO2: 98                  RADIOLOGY & ADDITIONAL TESTS:

## 2017-07-01 NOTE — PROGRESS NOTE ADULT - SUBJECTIVE AND OBJECTIVE BOX
# CC: Increasing O2 demands    ## HPI:  9F PMH of dementia / (LASHELL), chronic COPD, HTN, prior hx of aspiration pna and PE who presents to the ED with AMS and sob. Aide called EMS for tremor like activity and wet and coarse breathing. Pt found to have coarse lung sounds and sputum and was intubated in field for airway protection. No recent complaints of cough, abd pain, dysuria or headache. Pt in ED with breath sounds equal bilaterally, O2 sat 100%. Moving all 4 extremities.  Transferred to CCU on full vent.    O/N:  No acute events, remained intubated.  Tolerating SAT / SBT today on rounds. No significant secretions.    ## ROS: Unobtainable due to intubation and baseline mental status.    ## Labs:  CBC:             9.8    17.9  )-----------( 193      ( 01 Jul 2017 04:56 )             29.1     Chem:  07-01    143  |  107  |  16  ----------------------------<  133<H>  4.2   |  28  |  0.84    Ca    7.7<L>      01 Jul 2017 04:56  Phos  2.7     07-01  Mg     2.3     07-01    TPro  6.2  /  Alb  2.4<L>  /  TBili  0.9  /  DBili  x   /  AST  40<H>  /  ALT  21  /  AlkPhos  88  06-30    ## Imaging:  CXR  A follow-up portable radiograph is compared to 6/30/2017. There is no   interval change. Support devices in situ.    ## Medications:  piperacillin/tazobactam IVPB. 3.375 Gram(s) IV Intermittent every 8 hours  azithromycin  IVPB 500 milliGRAM(s) IV Intermittent every 24 hours  vancomycin  IVPB 500 milliGRAM(s) IV Intermittent every 12 hours    ALBUTerol    0.083% 2.5 milliGRAM(s) Nebulizer every 6 hours    acetaminophen   Tablet 650 milliGRAM(s) Oral every 6 hours PRN  heparin  Injectable 5000 Unit(s) SubCutaneous every 8 hours    pantoprazole  Injectable 40 milliGRAM(s) IV Push daily  senna Syrup 15 milliLiter(s) Oral two times a day  methylPREDNISolone sodium succinate Injectable 40 milliGRAM(s) IV Push every 12 hours    ## O/E:  T(F): 98.5 (01 Jul 2017 15:22), Max: 98.6 (30 Jun 2017 20:30)  HR: 103 (01 Jul 2017 17:13) (44 - 110)  BP: 165/74 (01 Jul 2017 17:00) (102/62 - 180/92)  BP(mean): 98 (01 Jul 2017 17:00) (60 - 116)  RR: 19 (01 Jul 2017 17:00) (11 - 22)  SpO2: 100% (01 Jul 2017 17:13) (89% - 100%)  IN: 2701 mL / OUT: 1865 mL / NET: 835.9 mL    Gen: orotracheally intubated on pressure support no distress  HEENT: PERRL  Resp: mechanical breath sounds  CVS: S1S2 no m/r/g  Abd: soft NT/ND +BS  Ext: no c/c, trace B/L LE edema  Neuro: opens eyes to voice but does not follow commands    ## Daily Issues  - Analgesia: fentanyl  - Sedation: N/A  - HOB elevation: Y  - GI ppx (ulcers): PPI  - DVT ppx: Hep SC  - Nutrition: tube feeds  - Central line: N  - Shepherd: N    ## Assessment / Plan:  89F with dyspnea, possible aspiration  - This AM on rounds, doing well on SAT / SBT with RR 20s VT 400s. Minimal oxygen requirements; per nursing, minimal secretions from ETT.  - Successfully extubated. Per family, mental status is largely at baseline given H/O LASHELL  - Oxygen as needed to maintain spO2 >90%  - Empiric antibiotics with vanco / Zosyn / azithromycin day 2; no cultures positive, leukocytosis improving and afebrile. Can continue up to 5 days total of empiric antibiotics if no cultures positive.  - c/w steroids for now, start taper tomorrow.  - Possible aspiration risk given history of present illness and poor baseline mental status. Will get speech / swallow evaluation.    ## Code status:  Goals of care discussion: [x] yes [ ] no  [x] full code  [ ] DNR  [ ] DNI  [ ] MOLST    Critical care time: 35 minutes

## 2017-07-02 DIAGNOSIS — G30.8 OTHER ALZHEIMER'S DISEASE: ICD-10-CM

## 2017-07-02 PROCEDURE — 99223 1ST HOSP IP/OBS HIGH 75: CPT

## 2017-07-02 PROCEDURE — 71010: CPT | Mod: 26

## 2017-07-02 RX ORDER — CLONAZEPAM 1 MG
0.25 TABLET ORAL
Qty: 0 | Refills: 0 | Status: DISCONTINUED | OUTPATIENT
Start: 2017-07-02 | End: 2017-07-07

## 2017-07-02 RX ORDER — QUETIAPINE FUMARATE 200 MG/1
12.5 TABLET, FILM COATED ORAL AT BEDTIME
Qty: 0 | Refills: 0 | Status: DISCONTINUED | OUTPATIENT
Start: 2017-07-02 | End: 2017-07-02

## 2017-07-02 RX ORDER — QUETIAPINE FUMARATE 200 MG/1
12.5 TABLET, FILM COATED ORAL AT BEDTIME
Qty: 0 | Refills: 0 | Status: DISCONTINUED | OUTPATIENT
Start: 2017-07-02 | End: 2017-07-07

## 2017-07-02 RX ORDER — SENNA PLUS 8.6 MG/1
2 TABLET ORAL DAILY
Qty: 0 | Refills: 0 | Status: DISCONTINUED | OUTPATIENT
Start: 2017-07-02 | End: 2017-07-07

## 2017-07-02 RX ORDER — QUETIAPINE FUMARATE 200 MG/1
25 TABLET, FILM COATED ORAL
Qty: 0 | Refills: 0 | Status: DISCONTINUED | OUTPATIENT
Start: 2017-07-02 | End: 2017-07-02

## 2017-07-02 RX ADMIN — HEPARIN SODIUM 5000 UNIT(S): 5000 INJECTION INTRAVENOUS; SUBCUTANEOUS at 21:58

## 2017-07-02 RX ADMIN — Medication 40 MILLIGRAM(S): at 17:18

## 2017-07-02 RX ADMIN — SENNA PLUS 15 MILLILITER(S): 8.6 TABLET ORAL at 05:29

## 2017-07-02 RX ADMIN — PIPERACILLIN AND TAZOBACTAM 25 GRAM(S): 4; .5 INJECTION, POWDER, LYOPHILIZED, FOR SOLUTION INTRAVENOUS at 05:28

## 2017-07-02 RX ADMIN — ALBUTEROL 2.5 MILLIGRAM(S): 90 AEROSOL, METERED ORAL at 17:42

## 2017-07-02 RX ADMIN — ALBUTEROL 2.5 MILLIGRAM(S): 90 AEROSOL, METERED ORAL at 11:58

## 2017-07-02 RX ADMIN — ALBUTEROL 2.5 MILLIGRAM(S): 90 AEROSOL, METERED ORAL at 00:22

## 2017-07-02 RX ADMIN — CHLORHEXIDINE GLUCONATE 1 APPLICATION(S): 213 SOLUTION TOPICAL at 11:41

## 2017-07-02 RX ADMIN — ALBUTEROL 2.5 MILLIGRAM(S): 90 AEROSOL, METERED ORAL at 05:24

## 2017-07-02 RX ADMIN — HEPARIN SODIUM 5000 UNIT(S): 5000 INJECTION INTRAVENOUS; SUBCUTANEOUS at 13:54

## 2017-07-02 RX ADMIN — Medication 40 MILLIGRAM(S): at 05:29

## 2017-07-02 RX ADMIN — SENNA PLUS 2 TABLET(S): 8.6 TABLET ORAL at 17:18

## 2017-07-02 RX ADMIN — QUETIAPINE FUMARATE 12.5 MILLIGRAM(S): 200 TABLET, FILM COATED ORAL at 21:58

## 2017-07-02 RX ADMIN — HEPARIN SODIUM 5000 UNIT(S): 5000 INJECTION INTRAVENOUS; SUBCUTANEOUS at 05:29

## 2017-07-02 RX ADMIN — PANTOPRAZOLE SODIUM 40 MILLIGRAM(S): 20 TABLET, DELAYED RELEASE ORAL at 11:41

## 2017-07-02 RX ADMIN — Medication 0.25 MILLIGRAM(S): at 17:19

## 2017-07-02 NOTE — CHART NOTE - NSCHARTNOTEFT_GEN_A_CORE
Pt is a 89 yo lady with a pmhx of dementia, alzheimers, chronic COPD, acute respiratory failure HTN, prior hx of aspiration pna and PE who presents to the ED with AMS and sob. Aide called EMS for tremor like activity and wet and coarse breathing. Pt found to have coarse lung sounds and sputum and was intubated in field for airway protection.  Pt was extubated successfully 7/1/17 and placed on BiPAP.  Pt HD stable, VSS, respiratory failure has improved and pt is read to be transferred to floor.  Due to 2nd Degree type 2 HB patient can be transferred to Telemetry at this time. Pt is a 89 yo lady with a pmhx of dementia, alzheimers, chronic COPD, acute respiratory failure HTN, prior hx of aspiration pna and PE who presents to the ED with AMS and sob. Aide called EMS for tremor like activity and wet and coarse breathing. Pt found to have coarse lung sounds and sputum and was intubated in field for airway protection.  Pt was extubated successfully 7/1/17 and placed on BiPAP.  Pt HD stable, VSS, respiratory failure has improved and pt is read to be transferred to floor.  Due to 2nd Degree type 2 HB patient can be transferred to Telemetry at this time. Pt signed out to Dr. Leigh

## 2017-07-02 NOTE — PROGRESS NOTE ADULT - ASSESSMENT
adv age w multiple comobidities   w KPS<30  V POOR PPS  prognosis is poor   still full code   GOC and adv dier discussion is in progress

## 2017-07-02 NOTE — PROGRESS NOTE ADULT - SUBJECTIVE AND OBJECTIVE BOX
# CC: Increasing O2 demands    ## HPI:  9F PMH of dementia / (LASHELL), chronic COPD, HTN, prior hx of aspiration pna and PE who presents to the ED with AMS and sob. Aide called EMS for tremor like activity and wet and coarse breathing. Pt found to have coarse lung sounds and sputum and was intubated in field for airway protection. No recent complaints of cough, abd pain, dysuria or headache. Pt in ED with breath sounds equal bilaterally, O2 sat 100%. Moving all 4 extremities.  Transferred to CCU on full vent.    O/N: No events overnight    ## ROS: Unobtainable due to baseline mental status.    ## Labs:             9.8    17.9  )-----------( 193      ( 01 Jul 2017 04:56 )             29.1     143  |  107  |  16  ----------------------------<  133<H>  4.2   |  28  |  0.84    Ca    7.7<L>      01 Jul 2017 04:56  Phos  2.7     07-01  Mg     2.3     07-01    ## Imaging:  CXR:  Interval extubation.  Small bilateral pleural effusions. Overlying atelectasis and/or infiltrate cannot be excluded.      ## Medications:  ALBUTerol    0.083% 2.5 milliGRAM(s) Nebulizer every 6 hours    acetaminophen   Tablet 650 milliGRAM(s) Oral every 6 hours PRN  clonazePAM Tablet 0.25 milliGRAM(s) Oral two times a day  QUEtiapine 12.5 milliGRAM(s) Oral at bedtime    heparin  Injectable 5000 Unit(s) SubCutaneous every 8 hours  pantoprazole  Injectable 40 milliGRAM(s) IV Push daily    senna Syrup 15 milliLiter(s) Oral two times a day  methylPREDNISolone sodium succinate Injectable 40 milliGRAM(s) IV Push every 12 hours    ## O/E:  T(F): 99.3 (07-02-17 @ 12:47), Max: 99.3 (07-02-17 @ 12:47)  HR: 98 (07-02-17 @ 13:00) (41 - 113)  BP: 156/83 (07-02-17 @ 13:00) (100/42 - 192/118)  RR: 18 (07-02-17 @ 13:00) (11 - 31)  SpO2: 95% (07-02-17 @ 13:00) (85% - 100%)  IN: 2560 mL / OUT: 1964 mL / NET: 596 mL    Gen: lying in bed in NAD on nasal cannula  HEENT: PERRL, NGT in place  Resp: CTA B/L no c/r/w  CVS: S1S2 no m/r/g  Abd: soft NT/ND +BS  Ext: no c/c, trace B/L LE edema  Neuro: muttering to herself in non-understandable language    ## Daily Issues  - Analgesia: N/A  - Sedation: N/A  - HOB elevation: Y  - GI ppx (ulcers): N/A  - DVT ppx: Hep SC  - Nutrition: tube feeds  - Central line: N  - Shepherd: N    ## Assessment / Plan:  89F with dyspnea, possible aspiration  - Extubated yesterday, on NIPPV overnight. Episodes of desaturation and bradycardia when sleeping. Cardiology consulted  - Passed bedside swallow, now on puree / nectar thick. Will remove NGT  - Mental status is largely at baseline given H/O LASHELL  - Empiric antibiotics with vanco / Zosyn / azithromycin day 3; no cultures positive, leukocytosis improving and afebrile. CT showed B/L upper lobe opacities with some evidence of chronic bronchitis. Will D/C today.  - c/w steroids for now, tapering to Q12h  - Palliative care consult for goals of care discussion.    ## Code status:  Goals of care discussion: [x] yes [ ] no  [x] full code  [ ] DNR  [ ] DNI  [ ] ELEAZAR

## 2017-07-02 NOTE — PROGRESS NOTE ADULT - SUBJECTIVE AND OBJECTIVE BOX
Patient is a 89y old  Female who presents with a chief complaint of increasing O2 demands (29 Jun 2017 21:27)      INTERVAL HPI/OVERNIGHT EVENTS:  pt extubated  MEDICATIONS  (STANDING):  methylPREDNISolone sodium succinate Injectable 40 milliGRAM(s) IV Push every 12 hours  heparin  Injectable 5000 Unit(s) SubCutaneous every 8 hours  pantoprazole  Injectable 40 milliGRAM(s) IV Push daily  ALBUTerol    0.083% 2.5 milliGRAM(s) Nebulizer every 6 hours  senna Syrup 15 milliLiter(s) Oral two times a day  chlorhexidine 4% Liquid 1 Application(s) Topical daily  clonazePAM Tablet 0.25 milliGRAM(s) Oral two times a day  QUEtiapine 12.5 milliGRAM(s) Oral at bedtime    MEDICATIONS  (PRN):  acetaminophen   Tablet 650 milliGRAM(s) Oral every 6 hours PRN For Temp greater than 38 C (100.4 F)      Allergies    Haldol (Unknown)    Intolerances        REVIEW OF SYSTEMS:  CONSTITUTIONAL: No fever, weight loss, or fatigue  EYES: No eye pain, visual disturbances, or discharge  ENMT:  No difficulty hearing, tinnitus, vertigo; No sinus or throat pain  NECK: No pain or stiffness  BREASTS: No pain, masses, or nipple discharge  RESPIRATORY: No cough, wheezing, chills or hemoptysis; No shortness of breath  CARDIOVASCULAR: No chest pain, palpitations, dizziness, or leg swelling  GASTROINTESTINAL: No abdominal or epigastric pain. No nausea, vomiting, or hematemesis; No diarrhea or constipation. No melena or hematochezia.  GENITOURINARY: No dysuria, frequency, hematuria, or incontinence  NEUROLOGICAL: No headaches, memory loss, loss of strength, numbness, or tremors  SKIN: No itching, burning, rashes, or lesions   LYMPH NODES: No enlarged glands  ENDOCRINE: No heat or cold intolerance; No hair loss  MUSCULOSKELETAL: No joint pain or swelling; No muscle, back, or extremity pain  PSYCHIATRIC: No depression, anxiety, mood swings, or difficulty sleeping  HEME/LYMPH: No easy bruising, or bleeding gums  ALLERGY AND IMMUNOLOGIC: No hives or eczema    Vital Signs Last 24 Hrs  T(C): 36.6 (02 Jul 2017 07:15), Max: 37.1 (01 Jul 2017 23:57)  T(F): 97.8 (02 Jul 2017 07:15), Max: 98.8 (01 Jul 2017 23:57)  HR: 51 (02 Jul 2017 09:00) (41 - 113)  BP: 119/44 (02 Jul 2017 09:00) (100/42 - 192/105)  BP(mean): 65 (02 Jul 2017 09:00) (57 - 125)  RR: 13 (02 Jul 2017 09:00) (11 - 31)  SpO2: 90% (02 Jul 2017 09:00) (85% - 100%)    PHYSICAL EXAM:  GENERAL: NAD, well-groomed, well-developed  HEAD:  Atraumatic, Normocephalic  EYES: EOMI, PERRLA, conjunctiva and sclera clear  ENMT: No tonsillar erythema, exudates, or enlargement; Moist mucous membranes, Good dentition, No lesions  NECK: Supple, No JVD, Normal thyroid  NERVOUS SYSTEM:  Alert & Oriented X3, Good concentration; Motor Strength 5/5 B/L upper and lower extremities; DTRs 2+ intact and symmetric  CHEST/LUNG: Clear to percussion bilaterally; No rales, rhonchi, wheezing, or rubs  HEART: Regular rate and rhythm; No murmurs, rubs, or gallops  ABDOMEN: Soft, Nontender, Nondistended; Bowel sounds present  EXTREMITIES:  2+ Peripheral Pulses, No clubbing, cyanosis, or edema  LYMPH: No lymphadenopathy noted  SKIN: No rashes or lesions    LABS:                        9.8    17.9  )-----------( 193      ( 01 Jul 2017 04:56 )             29.1     07-01    143  |  107  |  16  ----------------------------<  133<H>  4.2   |  28  |  0.84    Ca    7.7<L>      01 Jul 2017 04:56  Phos  2.7     07-01  Mg     2.3     07-01          CAPILLARY BLOOD GLUCOSE        CULTURES:    HEMOGLOBIN A1C:    CHOLESTEROL:  Cholesterol, Serum: 212 mg/dL (07-01 @ 09:23)  HDL Cholesterol, Serum: 55 mg/dL (07-01 @ 09:23)  Triglycerides, Serum: 99 mg/dL (07-01 @ 09:23)        RADIOLOGY & ADDITIONAL TESTS:

## 2017-07-02 NOTE — CONSULT NOTE ADULT - SUBJECTIVE AND OBJECTIVE BOX
Chief Complaint:  Patient is a 89y old  Female who presents with a chief complaint of increasing O2 demands (2017 21:27)      HPI:   Pt is a 89 yo lady with a pmhx of dementia, alzheimers, chronic COPD, acute respiratory failure HTN, prior hx of aspiration pna and PE who presents to the ED with AMS and sob. Aide called EMS for tremor like activity and wet and coarse breathing. Pt found to have coarse lung sounds and sputum and was intubated in field for airway protection. No recent complaints of cough, abd pain, dysuria or headache. Pt in ED with breath sounds equal bilaterally, O2 sat 100%. Moving all 4 extremities.  Transferred to CCU; hypoxic and bradycardic.    Allergies and Intolerances:        Allergies:  	Haldol: Drug, Unknown    Home Medications:   · 	pulse oximeter:   · 	SEROquel 25 mg oral tablet: 1 tab(s) orally 2 times a day  · 	pantoprazole 40 mg oral delayed release tablet: 1 tab(s) orally once a day (before a meal)  · 	Nuedexta:  orally   · 	amLODIPine 2.5 mg oral tablet: 1 tab(s) orally once a day  · 	clonazePAM 0.25 mg oral tablet: 0.5 milligram(s) orally , As Needed    Past Medical History:  Alzheimer's Dementia with Behavioral Disturbance    Benign Essential Hypertension    Ovarian cancer    PE (pulmonary thromboembolism)    UTI (urinary tract infection).    Past Surgical History:  S/P [facility] Biopsy of Cervix with vag laceration repair    S/P D&C, operative hysteroscopy    Sigmoidoscopy Exam - Negative.    Family History:  No pertinent family history in first degree relatives.    Social History:  Social History (marital status, living situation, occupation, tobacco use, alcohol and drug use, and sexual history): , bed bound. Unknown tobacco and alcohol	    Review of Systems:  Unable do to dementia/confusion.    Physical Exam:  Vital Signs:  Vital Signs Last 24 Hrs  T(C): 37.7 (2017 15:00), Max: 37.7 (2017 15:00)  T(F): 99.8 (2017 15:00), Max: 99.8 (2017 15:00)  HR: 84 (2017 15:00) (41 - 113)  BP: 142/75 (2017 15:00) (100/42 - 192/118)  BP(mean): 88 (2017 15:00) (57 - 138)  RR: 22 (2017 15:00) (11 - 31)  SpO2: 98% (2017 15:00) (85% - 100%)  Daily     Daily Weight in k.8 (2017 06:00)  I&O's Summary    2017 07:  -  2017 07:00  --------------------------------------------------------  IN: 2560 mL / OUT: 1964 mL / NET: 596 mL    2017 07:  -  2017 15:47  --------------------------------------------------------  IN: 510 mL / OUT: 0 mL / NET: 510 mL      Tele:  General:  Appears stated age, well-groomed, well-nourished, no distress  HEENT:  NC/AT, patent nares w/ pink mucosa, OP clear w/o lesions, conjunctivae clear, no thyromegaly, nodules, adenopathy, no JVD  Chest:  Full & symmetric excursion, no increased effort, breath sounds clear  Cardiovascular:  Regular rhythm, S1, S2, no murmur/rub/S3/S4, no carotid/femoral/abdominal bruit, radial/pedal pulses 2+  Abdomen:  Soft, non-tender, non-distended, normoactive bowel sounds  Extremities: no edema.  Skin:  No rash/erythema/ecchymoses/petechiae/wounds/abscess/warm/dry  Musculoskeletal:  N/A.  Neuro/Psych:  Confused.     Laboratory:                            9.8    17.9  )-----------( 193      ( 2017 04:56 )             29.1     07-    143  |  107  |  16  ----------------------------<  133<H>  4.2   |  28  |  0.84    Ca    7.7<L>      2017 04:56  Phos  2.7       Mg     2.3           Imaging:  < from: Xray Chest 1 View AP/PA. (17 @ 07:00) >    EXAM:  CHEST SINGLE VIEW                            PROCEDURE DATE:  2017        INTERPRETATION:  History: Wheezing, COPD, chest extubated.    Findings: Frontal chest.    Comparison: 2017.    Interval extubation. NG tube again noted. Multiple EKG clips and wires as   well as oxygen facemask and tubing overlie the chest.    Heart size may be exaggerated by AP technique. Calcified aorta. Hazy   opacity at the lung bases, greater on the left; effusion, atelectasis   and/or infiltrate. Scoliosis.    Impression:    Interval extubation.    Small bilateral pleural effusions. Overlying atelectasis and/or   infiltrate cannot be excluded.    < end of copied text >      < from: CT Angio Chest w/ IV Cont (17 @ 13:46) >    EXAM:  CT ANGIO CHEST (W)AW IC                            PROCEDURE DATE:  2017        INTERPRETATION:  CLINICAL INFORMATION: Shortness of breath    COMPARISON: 3/20/2017    PROCEDURE:     Serial axial sections through the chest were obtainedfollowing   administration of nonionic intravenous contrast utilizing pulmonary   embolism protocol. Sagittal and coronal reformats were then generated   from the axial images. 3-D maximal intensity projection reconstructions   were performed on an independent workstation.    100 mls of Omnipaque 350 was administered intravenously without   complication and 0 mls were discarded.    FINDINGS:     There is an endotracheal tube in place with tip above the mathew.    PULMONARY ARTERIES: The bolus is of suboptimal quality for diagnosis of   pulmonary embolism. The study is nondiagnostic for evaluation of the   pulmonary arteries.    LUNG AND LARGE AIRWAYS: Emphysema and bronchiectasis. Stable bilateral   upper lobe pleural parenchymal thickening and right upper lobe cavity   (1.1 cm). Basilar atelectasis.  PLEURA: Trace bilateral pleural effusions.  VESSELS: Atherosclerotic changes of the aorta and coronary arteries. No   thoracic aortic aneurysm or dissection.  HEART: Borderline enlarged. No pericardial effusion.  MEDIASTINUM AND KHANH: Mild mediastinal adenopathy, stable.  CHEST WALL AND LOWER NECK: Within normal limits.    UPPER ABDOMEN: Small hiatal hernia. Adrenal thickening. Renal cortical   scarring. Nonspecific perinephric fluid.    BONES: Shoulder and spine degenerative changes. Convex right thoracic   scoliosis.    IMPRESSION:     Nondiagnostic for evaluation of pulmonary arteries.  Emphysema.    < end of copied text >    Assessment:  Pt is a 89 yo lady with a pmhx of dementia, alzheimers, chronic COPD, acute respiratory failure HTN, prior hx of aspiration pna and PE who presents to the ED with AMS and sob. Aide called EMS for tremor like activity and wet and coarse breathing. Pt found to have coarse lung sounds and sputum and was intubated in field for airway protection. No recent complaints of cough, abd pain, dysuria or headache. Pt in ED with breath sounds equal bilaterally, O2 sat 100%. Moving all 4 extremities.  Transferred to CCU; hypoxic and bradycardic.    Plan:     CCU care management.    Follow up labs.    Check echocardiogram.    Con't with:    MEDICATIONS  (STANDING):  methylPREDNISolone sodium succinate Injectable 40 milliGRAM(s) IV Push every 12 hours  heparin  Injectable 5000 Unit(s) SubCutaneous every 8 hours  ALBUTerol    0.083% 2.5 milliGRAM(s) Nebulizer every 6 hours  chlorhexidine 4% Liquid 1 Application(s) Topical daily  clonazePAM Tablet 0.25 milliGRAM(s) Oral two times a day  QUEtiapine 12.5 milliGRAM(s) Oral at bedtime  senna 2 Tablet(s) Oral daily    If bradycardia worsens, given her comorbidities, she does not represent a good pacemaker candidate.    Conservative care/palliative care management.    Sharad Fournier MD, FACC, JAY RIOJAS, FACP  Director, Heart Failure Services  [facility]  , Department of Cardiology  [employer]
HPI:  89 Y/O female PMHx of Dementia, PE, chair bound at home with 24 hour aide BIBEMS for decreasing O2 saturation going from 2LNC to 4LNC at home. While in ED, pt was documented has having increasing CO2 levels while on BIPAP. ICU called for further evaluation. (2017 21:27)      PAST MEDICAL & SURGICAL HISTORY:  UTI (urinary tract infection)  PE (pulmonary thromboembolism)  Ovarian cancer  Alzheimer's Dementia with Behavioral Disturbance  Benign Essential Hypertension  H/O aortic valve replacement  Sigmoidoscopy Exam - Negative  S/P [facility] Biopsy of Cervix with vag laceration repair  S/P D&C, operative hysteroscopy      REVIEW OF SYSTEMS:    CONSTITUTIONAL: No fever, weight loss, or fatigue  EYES: No eye pain, visual disturbances, or discharge  ENMT:  No difficulty hearing, tinnitus, vertigo; No sinus or throat pain  NECK: No pain or stiffness  BREASTS: No pain, masses, or nipple discharge  RESPIRATORY: No cough, wheezing, chills or hemoptysis; No shortness of breath  CARDIOVASCULAR: No chest pain, palpitations, dizziness, or leg swelling  GASTROINTESTINAL: No abdominal or epigastric pain. No nausea, vomiting, or hematemesis; No diarrhea or constipation. No melena or hematochezia.  GENITOURINARY: No dysuria, frequency, hematuria, or incontinence  NEUROLOGICAL: No headaches, memory loss, loss of strength, numbness, or tremors  SKIN: No itching, burning, rashes, or lesions   LYMPH NODES: No enlarged glands  ENDOCRINE: No heat or cold intolerance; No hair loss  MUSCULOSKELETAL: No joint pain or swelling; No muscle, back, or extremity pain  PSYCHIATRIC: No depression, anxiety, mood swings, or difficulty sleeping  HEME/LYMPH: No easy bruising, or bleeding gums  ALLERGY AND IMMUNOLOGIC: No hives or eczema      MEDICATIONS  (STANDING):  propofol Infusion 5 MICROgram(s)/kG/Min (2.1 mL/Hr) IV Continuous <Continuous>  methylPREDNISolone sodium succinate Injectable 40 milliGRAM(s) IV Push every 12 hours  piperacillin/tazobactam IVPB. 3.375 Gram(s) IV Intermittent every 8 hours  azithromycin  IVPB 500 milliGRAM(s) IV Intermittent every 24 hours  heparin  Injectable 5000 Unit(s) SubCutaneous every 8 hours  pantoprazole  Injectable 40 milliGRAM(s) IV Push daily  ALBUTerol    0.083% 2.5 milliGRAM(s) Nebulizer every 6 hours  vancomycin  IVPB 500 milliGRAM(s) IV Intermittent every 12 hours  senna Syrup 15 milliLiter(s) Oral two times a day    MEDICATIONS  (PRN):  acetaminophen   Tablet 650 milliGRAM(s) Oral every 6 hours PRN For Temp greater than 38 C (100.4 F)      Allergies    Haldol (Unknown)    Intolerances        SOCIAL HISTORY:    FAMILY HISTORY:  No pertinent family history in first degree relatives      Vital Signs Last 24 Hrs  T(C): 38.6 (2017 19:40), Max: 38.7 (2017 17:01)  T(F): 101.4 (2017 19:40), Max: 101.6 (2017 17:01)  HR: 66 (2017 19:40) (57 - 106)  BP: 103/48 (2017 19:40) (55/45 - 189/90)  BP(mean): 58 (2017 19:40) (48 - 144)  RR: 20 (2017 19:40) (11 - 31)  SpO2: 98% (2017 19:40) (75% - 99%)    PHYSICAL EXAM:    GENERAL: NAD, well-groomed, well-developed  HEAD:  Atraumatic, Normocephalic  EYES: EOMI, PERRLA, conjunctiva and sclera clear  ENMT: No tonsillar erythema, exudates, or enlargement; Moist mucous membranes, Good dentition, No lesions  NECK: Supple, No JVD, Normal thyroid  NERVOUS SYSTEM:  Alert & Oriented X3, Good concentration; Motor Strength 5/5 B/L upper and lower extremities; DTRs 2+ intact and symmetric  CHEST/LUNG: Clear to percussion bilaterally; No rales, rhonchi, wheezing, or rubs  HEART: Regular rate and rhythm; No murmurs, rubs, or gallops  ABDOMEN: Soft, Nontender, Nondistended; Bowel sounds present  EXTREMITIES:  2+ Peripheral Pulses, No clubbing, cyanosis, or edema  LYMPH: No lymphadenopathy notedRECTAL: No masses, prostate normal size and smooth, Guiac negative   BREAST: No palpatble masses, skin no lesions no retractions, no discharages. adenexa no palpable masses noted   GYN: uterus normal size, adenexa no palpable masses, no CMT, no uterine discharge   SKIN: No rashes or lesions      LABS:                        12.3   11.8  )-----------( 217      ( 2017 10:16 )             39.6         144  |  104  |  7   ----------------------------<  127<H>  4.3   |  34<H>  |  0.85    Ca    8.6      2017 10:15    TPro  8.2  /  Alb  3.0<L>  /  TBili  0.4  /  DBili  x   /  AST  26  /  ALT  19  /  AlkPhos  121<H>      PT/INR - ( 2017 10:15 )   PT: 10.4 sec;   INR: 0.96 ratio         PTT - ( 2017 10:15 )  PTT:33.4 sec  Urinalysis Basic - ( 2017 11:18 )    Color: Yellow / Appearance: Clear / S.010 / pH: x  Gluc: x / Ketone: Negative  / Bili: Negative / Urobili: Negative mg/dL   Blood: x / Protein: 30 mg/dL / Nitrite: Negative   Leuk Esterase: Negative / RBC: 3-5 /HPF / WBC x   Sq Epi: x / Non Sq Epi: x / Bacteria: x        RADIOLOGY & ADDITIONAL STUDIES:

## 2017-07-02 NOTE — PROGRESS NOTE ADULT - SUBJECTIVE AND OBJECTIVE BOX
· Subjective and Objective: 	  # CC: Increasing O2 demands    ## HPI:  9F PMH of dementia / (LASHELL), chronic COPD, HTN, prior hx of aspiration pna and PE who presents to the ED with AMS and sob. Aide called EMS for tremor like activity and wet and coarse breathing. Pt found to have coarse lung sounds and sputum and was intubated in field for airway protection. No recent complaints of cough, abd pain, dysuria or headache. Pt in ED with breath sounds equal bilaterally, O2 sat 100%. Moving all 4 extremities.  Transferred to CCU on full vent.    O/N:  No acute events, remained intubated.  Tolerating SAT / SBT today on rounds. No significant secretions.    ## ROS: Unobtainable due to intubation and baseline mental status.    ## Labs:  CBC:             9.8    17.9  )-----------( 193      ( 01 Jul 2017 04:56 )             29.1     Chem:  07-01    143  |  107  |  16  ----------------------------<  133<H>  4.2   |  28  |  0.84    Ca    7.7<L>      01 Jul 2017 04:56  Phos  2.7     07-01  Mg     2.3     07-01    TPro  6.2  /  Alb  2.4<L>  /  TBili  0.9  /  DBili  x   /  AST  40<H>  /  ALT  21  /  AlkPhos  88  06-30    ## Imaging:  CXR  A follow-up portable radiograph is compared to 6/30/2017. There is no   interval change. Support devices in situ.    ## Medications:  piperacillin/tazobactam IVPB. 3.375 Gram(s) IV Intermittent every 8 hours  azithromycin  IVPB 500 milliGRAM(s) IV Intermittent every 24 hours  vancomycin  IVPB 500 milliGRAM(s) IV Intermittent every 12 hours    ALBUTerol    0.083% 2.5 milliGRAM(s) Nebulizer every 6 hours    acetaminophen   Tablet 650 milliGRAM(s) Oral every 6 hours PRN  heparin  Injectable 5000 Unit(s) SubCutaneous every 8 hours    pantoprazole  Injectable 40 milliGRAM(s) IV Push daily  senna Syrup 15 milliLiter(s) Oral two times a day  methylPREDNISolone sodium succinate Injectable 40 milliGRAM(s) IV Push every 12 hours    ## O/E:  T(F): 98.5 (01 Jul 2017 15:22), Max: 98.6 (30 Jun 2017 20:30)  HR: 103 (01 Jul 2017 17:13) (44 - 110)  BP: 165/74 (01 Jul 2017 17:00) (102/62 - 180/92)  BP(mean): 98 (01 Jul 2017 17:00) (60 - 116)  RR: 19 (01 Jul 2017 17:00) (11 - 22)  SpO2: 100% (01 Jul 2017 17:13) (89% - 100%)  IN: 2701 mL / OUT: 1865 mL / NET: 835.9 mL    Gen: orotracheally intubated on pressure support no distress  HEENT: PERRL  Resp: mechanical breath sounds  CVS: S1S2 no m/r/g  Abd: soft NT/ND +BS  Ext: no c/c, trace B/L LE edema  Neuro: opens eyes to voice but does not follow commands    ## Daily Issues  - Analgesia: fentanyl  - Sedation: N/A  - HOB elevation: Y  - GI ppx (ulcers): PPI  - DVT ppx: Hep SC  - Nutrition: tube feeds  - Central line: N  - Shepherd: N    ## Assessment / Plan:  89F with dyspnea, possible aspiration  - This AM on rounds, doing well on SAT / SBT with RR 20s VT 400s. Minimal oxygen requirements; per nursing, minimal secretions from ETT.  - Successfully extubated. Per family, mental status is largely at baseline given H/O LASHELL  - Oxygen as needed to maintain spO2 >90%  - Empiric antibiotics with vanco / Zosyn / azithromycin day 2; no cultures positive, leukocytosis improving and afebrile. Can continue up to 5 days total of empiric antibiotics if no cultures positive.  - c/w steroids for now, start taper tomorrow.  - Possible aspiration risk given history of present illness and poor baseline mental status. Will get speech / swallow evaluation.    ## Code status:  Goals of care discussion: [x] yes [ ] no  [x] full code  [ ] DNR  [ ] DNI  [ ] MOLST    Critical care time: 35 minutes    Electronic Signatures:

## 2017-07-03 DIAGNOSIS — J15.6 PNEUMONIA DUE TO OTHER GRAM-NEGATIVE BACTERIA: ICD-10-CM

## 2017-07-03 LAB
-  AMIKACIN: SIGNIFICANT CHANGE UP
-  AZTREONAM: SIGNIFICANT CHANGE UP
-  CEFEPIME: SIGNIFICANT CHANGE UP
-  CEFTAZIDIME: SIGNIFICANT CHANGE UP
-  CIPROFLOXACIN: SIGNIFICANT CHANGE UP
-  GENTAMICIN: SIGNIFICANT CHANGE UP
-  IMIPENEM: SIGNIFICANT CHANGE UP
-  LEVOFLOXACIN: SIGNIFICANT CHANGE UP
-  MEROPENEM: SIGNIFICANT CHANGE UP
-  PIPERACILLIN/TAZOBACTAM: SIGNIFICANT CHANGE UP
-  TOBRAMYCIN: SIGNIFICANT CHANGE UP
ANION GAP SERPL CALC-SCNC: 7 MMOL/L — SIGNIFICANT CHANGE UP (ref 5–17)
BUN SERPL-MCNC: 17 MG/DL — SIGNIFICANT CHANGE UP (ref 7–23)
CALCIUM SERPL-MCNC: 9.1 MG/DL — SIGNIFICANT CHANGE UP (ref 8.5–10.1)
CHLORIDE SERPL-SCNC: 102 MMOL/L — SIGNIFICANT CHANGE UP (ref 96–108)
CO2 SERPL-SCNC: 32 MMOL/L — HIGH (ref 22–31)
CREAT SERPL-MCNC: 0.76 MG/DL — SIGNIFICANT CHANGE UP (ref 0.5–1.3)
CULTURE RESULTS: SIGNIFICANT CHANGE UP
GLUCOSE SERPL-MCNC: 105 MG/DL — HIGH (ref 70–99)
GRAM STN FLD: SIGNIFICANT CHANGE UP
HCT VFR BLD CALC: 36.3 % — SIGNIFICANT CHANGE UP (ref 34.5–45)
HGB BLD-MCNC: 11.6 G/DL — SIGNIFICANT CHANGE UP (ref 11.5–15.5)
MCHC RBC-ENTMCNC: 27.4 PG — SIGNIFICANT CHANGE UP (ref 27–34)
MCHC RBC-ENTMCNC: 32 GM/DL — SIGNIFICANT CHANGE UP (ref 32–36)
MCV RBC AUTO: 85.6 FL — SIGNIFICANT CHANGE UP (ref 80–100)
METHOD TYPE: SIGNIFICANT CHANGE UP
ORGANISM # SPEC MICROSCOPIC CNT: SIGNIFICANT CHANGE UP
ORGANISM # SPEC MICROSCOPIC CNT: SIGNIFICANT CHANGE UP
PLATELET # BLD AUTO: 171 K/UL — SIGNIFICANT CHANGE UP (ref 150–400)
POTASSIUM SERPL-MCNC: 4.1 MMOL/L — SIGNIFICANT CHANGE UP (ref 3.5–5.3)
POTASSIUM SERPL-SCNC: 4.1 MMOL/L — SIGNIFICANT CHANGE UP (ref 3.5–5.3)
RBC # BLD: 4.24 M/UL — SIGNIFICANT CHANGE UP (ref 3.8–5.2)
RBC # FLD: 17.3 % — HIGH (ref 11–15)
SODIUM SERPL-SCNC: 141 MMOL/L — SIGNIFICANT CHANGE UP (ref 135–145)
SPECIMEN SOURCE: SIGNIFICANT CHANGE UP
WBC # BLD: 9 K/UL — SIGNIFICANT CHANGE UP (ref 3.8–10.5)
WBC # FLD AUTO: 9 K/UL — SIGNIFICANT CHANGE UP (ref 3.8–10.5)

## 2017-07-03 PROCEDURE — 99497 ADVNCD CARE PLAN 30 MIN: CPT

## 2017-07-03 PROCEDURE — 93010 ELECTROCARDIOGRAM REPORT: CPT

## 2017-07-03 PROCEDURE — 99232 SBSQ HOSP IP/OBS MODERATE 35: CPT

## 2017-07-03 PROCEDURE — 99498 ADVNCD CARE PLAN ADDL 30 MIN: CPT

## 2017-07-03 RX ORDER — HYDRALAZINE HCL 50 MG
5 TABLET ORAL ONCE
Qty: 0 | Refills: 0 | Status: COMPLETED | OUTPATIENT
Start: 2017-07-03 | End: 2017-07-03

## 2017-07-03 RX ORDER — AMLODIPINE BESYLATE 2.5 MG/1
2.5 TABLET ORAL DAILY
Qty: 0 | Refills: 0 | Status: DISCONTINUED | OUTPATIENT
Start: 2017-07-04 | End: 2017-07-07

## 2017-07-03 RX ADMIN — HEPARIN SODIUM 5000 UNIT(S): 5000 INJECTION INTRAVENOUS; SUBCUTANEOUS at 13:09

## 2017-07-03 RX ADMIN — ALBUTEROL 2.5 MILLIGRAM(S): 90 AEROSOL, METERED ORAL at 11:29

## 2017-07-03 RX ADMIN — SENNA PLUS 2 TABLET(S): 8.6 TABLET ORAL at 13:09

## 2017-07-03 RX ADMIN — ALBUTEROL 2.5 MILLIGRAM(S): 90 AEROSOL, METERED ORAL at 00:00

## 2017-07-03 RX ADMIN — Medication 40 MILLIGRAM(S): at 17:26

## 2017-07-03 RX ADMIN — ALBUTEROL 2.5 MILLIGRAM(S): 90 AEROSOL, METERED ORAL at 05:33

## 2017-07-03 RX ADMIN — Medication 0.25 MILLIGRAM(S): at 05:06

## 2017-07-03 RX ADMIN — ALBUTEROL 2.5 MILLIGRAM(S): 90 AEROSOL, METERED ORAL at 17:14

## 2017-07-03 RX ADMIN — HEPARIN SODIUM 5000 UNIT(S): 5000 INJECTION INTRAVENOUS; SUBCUTANEOUS at 22:13

## 2017-07-03 RX ADMIN — Medication 5 MILLIGRAM(S): at 17:26

## 2017-07-03 RX ADMIN — QUETIAPINE FUMARATE 12.5 MILLIGRAM(S): 200 TABLET, FILM COATED ORAL at 22:13

## 2017-07-03 RX ADMIN — HEPARIN SODIUM 5000 UNIT(S): 5000 INJECTION INTRAVENOUS; SUBCUTANEOUS at 05:06

## 2017-07-03 RX ADMIN — Medication 0.25 MILLIGRAM(S): at 17:26

## 2017-07-03 RX ADMIN — Medication 40 MILLIGRAM(S): at 05:06

## 2017-07-03 NOTE — SWALLOW BEDSIDE ASSESSMENT ADULT - SPECIFY REASON(S)
Ivan stated "NPO until further recommendations made   additional information: on tube feeding" Ivan stated "NPO until further recommendations made   additional information: on tube feeding"                Pt extubated on 7/01/2017.

## 2017-07-03 NOTE — SWALLOW BEDSIDE ASSESSMENT ADULT - ASR SWALLOW ASPIRATION MONITOR
upper respiratory infection/pneumonia/cough/throat clearing/change of breathing pattern/gurgly voice

## 2017-07-03 NOTE — SWALLOW BEDSIDE ASSESSMENT ADULT - PHARYNGEAL PHASE
Decreased laryngeal elevation/Delayed pharyngeal swallow/Within functional limits Delayed pharyngeal swallow/Within functional limits/Inconsistent delayed cough noted ?baseline cough./Decreased laryngeal elevation Inconsistent delayed cough noted ?baseline cough./Decreased laryngeal elevation/Within functional limits/Delayed pharyngeal swallow Decreased laryngeal elevation/Wet vocal quality post oral intake/Delayed cough post oral intake/Delayed pharyngeal swallow Delayed pharyngeal swallow/Decreased laryngeal elevation Decreased laryngeal elevation/Delayed pharyngeal swallow/Inconsistent delayed cough noted maybe 2/2 baseline cough. Inconsistent delayed cough noted maybe 2/2 baseline cough./Delayed pharyngeal swallow/Decreased laryngeal elevation

## 2017-07-03 NOTE — PROGRESS NOTE ADULT - SUBJECTIVE AND OBJECTIVE BOX
Chief Complaint:  Patient is a 89y old  Female who presents with a chief complaint of increasing O2 demands (29 Jun 2017 21:27)      HPI:   Pt is a 89 yo lady with a pmhx of dementia, alzheimers, chronic COPD, acute respiratory failure HTN, prior hx of aspiration pna and PE who presents to the ED with AMS and sob. Aide called EMS for tremor like activity and wet and coarse breathing. Pt found to have coarse lung sounds and sputum and was intubated in field for airway protection. No recent complaints of cough, abd pain, dysuria or headache. Pt in ED with breath sounds equal bilaterally, O2 sat 100%. Moving all 4 extremities. Seems to be doing better today.    Review of Systems:  Unable do to dementia/confusion.    Physical Exam:  Vital Signs Last 24 Hrs  T(C): 37.1 (03 Jul 2017 10:50), Max: 37.7 (02 Jul 2017 21:01)  T(F): 98.8 (03 Jul 2017 10:50), Max: 99.9 (02 Jul 2017 21:01)  HR: 102 (03 Jul 2017 13:01) (77 - 112)  BP: 164/98 (03 Jul 2017 13:01) (123/92 - 193/113)  BP(mean): 104 (02 Jul 2017 19:26) (96 - 105)  RR: 18 (03 Jul 2017 12:03) (15 - 21)  SpO2: 100% (03 Jul 2017 13:01) (93% - 100%)    General:  Appears stated age, well-groomed, well-nourished, no distress  HEENT:  NC/AT, patent nares w/ pink mucosa, OP clear w/o lesions, conjunctivae clear, no thyromegaly, nodules, adenopathy, no JVD  Chest:  Full & symmetric excursion, no increased effort, breath sounds clear  Cardiovascular:  Regular rhythm, S1, S2, no murmur/rub/S3/S4, no carotid/femoral/abdominal bruit, radial/pedal pulses 2+  Abdomen:  Soft, non-tender, non-distended, normoactive bowel sounds  Extremities: no edema.  Skin:  No rash/erythema/ecchymoses/petechiae/wounds/abscess/warm/dry  Musculoskeletal:  N/A.  Neuro/Psych:  Confused.     Laboratory:                            11.6   9.0   )-----------( 171      ( 03 Jul 2017 14:28 )             36.3           Imaging:    < from: Xray Chest 1 View AP/PA. (07.02.17 @ 07:00) >    EXAM:  CHEST SINGLE VIEW                            PROCEDURE DATE:  07/02/2017        INTERPRETATION:  History: Wheezing, COPD, chest extubated.    Findings: Frontal chest.    Comparison: 7/1/2017.    Interval extubation. NG tube again noted. Multiple EKG clips and wires as   well as oxygen facemask and tubing overlie the chest.    Heart size may be exaggerated by AP technique. Calcified aorta. Hazy   opacity at the lung bases, greater on the left; effusion, atelectasis   and/or infiltrate. Scoliosis.    Impression:    Interval extubation.    Small bilateral pleural effusions. Overlying atelectasis and/or   infiltrate cannot be excluded.    < end of copied text >      < from: CT Angio Chest w/ IV Cont (06.29.17 @ 13:46) >    EXAM:  CT ANGIO CHEST (W)AW IC                            PROCEDURE DATE:  06/29/2017        INTERPRETATION:  CLINICAL INFORMATION: Shortness of breath    COMPARISON: 3/20/2017    PROCEDURE:     Serial axial sections through the chest were obtainedfollowing   administration of nonionic intravenous contrast utilizing pulmonary   embolism protocol. Sagittal and coronal reformats were then generated   from the axial images. 3-D maximal intensity projection reconstructions   were performed on an independent workstation.    100 mls of Omnipaque 350 was administered intravenously without   complication and 0 mls were discarded.    FINDINGS:     There is an endotracheal tube in place with tip above the mathew.    PULMONARY ARTERIES: The bolus is of suboptimal quality for diagnosis of   pulmonary embolism. The study is nondiagnostic for evaluation of the   pulmonary arteries.    LUNG AND LARGE AIRWAYS: Emphysema and bronchiectasis. Stable bilateral   upper lobe pleural parenchymal thickening and right upper lobe cavity   (1.1 cm). Basilar atelectasis.  PLEURA: Trace bilateral pleural effusions.  VESSELS: Atherosclerotic changes of the aorta and coronary arteries. No   thoracic aortic aneurysm or dissection.  HEART: Borderline enlarged. No pericardial effusion.  MEDIASTINUM AND KHANH: Mild mediastinal adenopathy, stable.  CHEST WALL AND LOWER NECK: Within normal limits.    UPPER ABDOMEN: Small hiatal hernia. Adrenal thickening. Renal cortical   scarring. Nonspecific perinephric fluid.    BONES: Shoulder and spine degenerative changes. Convex right thoracic   scoliosis.    IMPRESSION:     Nondiagnostic for evaluation of pulmonary arteries.  Emphysema.    < end of copied text >    Assessment:  Pt is a 89 yo lady with a pmhx of dementia, alzheimers, chronic COPD, acute respiratory failure HTN, prior hx of aspiration pna and PE who presents to the ED with AMS and sob. Aide called EMS for tremor like activity and wet and coarse breathing. Pt found to have coarse lung sounds and sputum and was intubated in field for airway protection. No recent complaints of cough, abd pain, dysuria or headache. Pt in ED with breath sounds equal bilaterally, O2 sat 100%. Moving all 4 extremities.  Transferred to CCU; hypoxic and bradycardic.    Plan:       Check echocardiogram.    Con't with:    MEDICATIONS  (STANDING):  methylPREDNISolone sodium succinate Injectable 40 milliGRAM(s) IV Push every 12 hours  heparin  Injectable 5000 Unit(s) SubCutaneous every 8 hours  ALBUTerol    0.083% 2.5 milliGRAM(s) Nebulizer every 6 hours  clonazePAM Tablet 0.25 milliGRAM(s) Oral two times a day  QUEtiapine 12.5 milliGRAM(s) Oral at bedtime  senna 2 Tablet(s) Oral daily    If bradycardia worsens, given her comorbidities, she does not represent a good pacemaker candidate.    Conservative care/palliative care management.    Sharad Fournier MD, FACC, BEULAH, JAY, FACP  Director, Heart Failure Services  [facility]  , Department of Cardiology  [employer] [facility] [employer] Chief Complaint:  Patient is a 89y old  Female who presents with a chief complaint of increasing O2 demands (29 Jun 2017 21:27)      HPI:   Pt is a 89 yo lady with a pmhx of dementia, alzheimers, chronic COPD, acute respiratory failure HTN, prior hx of aspiration pna and PE who presents to the ED with AMS and sob. Aide called EMS for tremor like activity and wet and coarse breathing. Pt found to have coarse lung sounds and sputum and was intubated in field for airway protection. No recent complaints of cough, abd pain, dysuria or headache. Pt in ED with breath sounds equal bilaterally, O2 sat 100%. Moving all 4 extremities. Seems to be doing better today. Resting now.    Review of Systems:  Unable do to dementia/confusion.    Physical Exam:  Vital Signs Last 24 Hrs  T(C): 37.1 (03 Jul 2017 10:50), Max: 37.7 (02 Jul 2017 21:01)  T(F): 98.8 (03 Jul 2017 10:50), Max: 99.9 (02 Jul 2017 21:01)  HR: 102 (03 Jul 2017 13:01) (77 - 112)  BP: 164/98 (03 Jul 2017 13:01) (123/92 - 193/113)  BP(mean): 104 (02 Jul 2017 19:26) (96 - 105)  RR: 18 (03 Jul 2017 12:03) (15 - 21)  SpO2: 100% (03 Jul 2017 13:01) (93% - 100%)    Tele: SR, periods of second degree AVB Mobitz type one  General:  Appears stated age, well-groomed, well-nourished, no distress  HEENT:  NC/AT, patent nares w/ pink mucosa, OP clear w/o lesions, conjunctivae clear, no thyromegaly, nodules, adenopathy, no JVD  Chest:  Full & symmetric excursion, no increased effort, breath sounds clear  Cardiovascular:  Regular rhythm, S1, S2, no murmur/rub/S3/S4, no carotid/femoral/abdominal bruit, radial/pedal pulses 2+  Abdomen:  Soft, non-tender, non-distended, normoactive bowel sounds  Extremities: no edema.  Skin:  No rash/erythema/ecchymoses/petechiae/wounds/abscess/warm/dry  Musculoskeletal:  N/A.  Neuro/Psych:  Confused.     Laboratory:                            11.6   9.0   )-----------( 171      ( 03 Jul 2017 14:28 )             36.3           Imaging:    < from: Xray Chest 1 View AP/PA. (07.02.17 @ 07:00) >    EXAM:  CHEST SINGLE VIEW                            PROCEDURE DATE:  07/02/2017        INTERPRETATION:  History: Wheezing, COPD, chest extubated.    Findings: Frontal chest.    Comparison: 7/1/2017.    Interval extubation. NG tube again noted. Multiple EKG clips and wires as   well as oxygen facemask and tubing overlie the chest.    Heart size may be exaggerated by AP technique. Calcified aorta. Hazy   opacity at the lung bases, greater on the left; effusion, atelectasis   and/or infiltrate. Scoliosis.    Impression:    Interval extubation.    Small bilateral pleural effusions. Overlying atelectasis and/or   infiltrate cannot be excluded.    < end of copied text >      < from: CT Angio Chest w/ IV Cont (06.29.17 @ 13:46) >    EXAM:  CT ANGIO CHEST (W)AW IC                            PROCEDURE DATE:  06/29/2017        INTERPRETATION:  CLINICAL INFORMATION: Shortness of breath    COMPARISON: 3/20/2017    PROCEDURE:     Serial axial sections through the chest were obtainedfollowing   administration of nonionic intravenous contrast utilizing pulmonary   embolism protocol. Sagittal and coronal reformats were then generated   from the axial images. 3-D maximal intensity projection reconstructions   were performed on an independent workstation.    100 mls of Omnipaque 350 was administered intravenously without   complication and 0 mls were discarded.    FINDINGS:     There is an endotracheal tube in place with tip above the mathew.    PULMONARY ARTERIES: The bolus is of suboptimal quality for diagnosis of   pulmonary embolism. The study is nondiagnostic for evaluation of the   pulmonary arteries.    LUNG AND LARGE AIRWAYS: Emphysema and bronchiectasis. Stable bilateral   upper lobe pleural parenchymal thickening and right upper lobe cavity   (1.1 cm). Basilar atelectasis.  PLEURA: Trace bilateral pleural effusions.  VESSELS: Atherosclerotic changes of the aorta and coronary arteries. No   thoracic aortic aneurysm or dissection.  HEART: Borderline enlarged. No pericardial effusion.  MEDIASTINUM AND KHANH: Mild mediastinal adenopathy, stable.  CHEST WALL AND LOWER NECK: Within normal limits.    UPPER ABDOMEN: Small hiatal hernia. Adrenal thickening. Renal cortical   scarring. Nonspecific perinephric fluid.    BONES: Shoulder and spine degenerative changes. Convex right thoracic   scoliosis.    IMPRESSION:     Nondiagnostic for evaluation of pulmonary arteries.  Emphysema.    < end of copied text >    Assessment:  Pt is a 89 yo lady with a pmhx of dementia, alzheimers, chronic COPD, acute respiratory failure HTN, prior hx of aspiration pna and PE who presents to the ED with AMS and sob. Aide called EMS for tremor like activity and wet and coarse breathing. Pt found to have coarse lung sounds and sputum and was intubated in field for airway protection. No recent complaints of cough, abd pain, dysuria or headache. Pt in ED with breath sounds equal bilaterally, O2 sat 100%. Moving all 4 extremities.  Transferred to CCU; hypoxic and bradycardic.    Plan:       Check echocardiogram.    Con't with:    MEDICATIONS  (STANDING):  methylPREDNISolone sodium succinate Injectable 40 milliGRAM(s) IV Push every 12 hours  heparin  Injectable 5000 Unit(s) SubCutaneous every 8 hours  ALBUTerol    0.083% 2.5 milliGRAM(s) Nebulizer every 6 hours  clonazePAM Tablet 0.25 milliGRAM(s) Oral two times a day  QUEtiapine 12.5 milliGRAM(s) Oral at bedtime  senna 2 Tablet(s) Oral daily    If bradycardia worsens, given her comorbidities, she does not represent a good pacemaker candidate.    Conservative care/palliative care management.    Sharad Fournier MD, FACC, JAY RIOJAS, FACP  Director, Heart Failure Services  [facility]  , Department of Cardiology  [facility]

## 2017-07-03 NOTE — SWALLOW BEDSIDE ASSESSMENT ADULT - H & P REVIEW
yes/Patient is a 89y old  Female who presents with a chief complaint of increasing O2 demands (29 Jun 2017 21:27)

## 2017-07-03 NOTE — OCCUPATIONAL THERAPY INITIAL EVALUATION ADULT - RANGE OF MOTION EXAMINATION, LOWER EXTREMITY
Right LE Passive ROM was WFL  (within functional limits)/Right LE Active Assistive ROM was WFL  (within functional limits)/Left LE Passive ROM was WFL (w/i functional limits)

## 2017-07-03 NOTE — PROGRESS NOTE ADULT - ASSESSMENT
Pt is a 89 yo lady with a pmhx of dementia, Alzheimer's chronic COPD, acute respiratory failure HTN, prior hx of aspiration pna and PE who presents to the ED with AMS and sob. Aide called EMS for tremor like activity and wet and coarse breathing. Pt found to have coarse lung sounds and sputum and was intubated in field for airway protection. No recent complaints of cough, abd pain, dysuria or headache. Pt in ED with breath sounds equal bilaterally, O2 sat 100%. Moving all 4 extremities.    PROBLEM/RECOMMENDATION: 1) Problem : Goals of care, counseling/ discussion.  Recommendation: Meet with patient's family and discussed GOC & Advanced care planning                                    Palliative care information /counseling given                                        Advanced Directives addressed   PROBLEM/ RECOMMENDATION:2)Problem :Resuscitation/ DNR/ DNI,   Recommendation: DNR/ DNI    PROBLEM/ RECOMMENDATION :3)Problem : Medical order for life sustaning treatment  Recommendation : MOLST Form discussed.    PROBLEM/RECOMMENDATION :4)Problem : Advanced care planning  Recommendation : Family meeting on:     PLAN:  REFERRALS  Hospice: YES [  ] NO [ x ]                         Palliative Med : YES [  ] NO [ x ]                         Unit SW/Case Mgmt: YES [ x ] NO [  ]                         : YES [  ] NO [x  ]                         Speech/Swallow: YES [  ] NO [ x ]                         Pain Management: YES [  ] NO [ x ]                         Nutrition: YES [ x ] NO [  ]                         Ethics: YES [  ] NO [x  ]                         PT/OT: YES [x  ] NO [  ]  Symptom Management Recommendations:   Family should consider on advanced care planning ( DNR/ DNI).  Home care with palliative care.

## 2017-07-03 NOTE — SWALLOW BEDSIDE ASSESSMENT ADULT - ORAL PHASE
Delayed oral transit time/Within functional limits Within functional limits/Delayed oral transit time Within functional limits Delayed oral transit time

## 2017-07-03 NOTE — OCCUPATIONAL THERAPY INITIAL EVALUATION ADULT - BALANCE DISTURBANCE, IDENTIFIED IMPAIRMENT CONTRIBUTE, REHAB EVAL
abnormal muscle tone/pain/decreased strength/decreased sensation/impaired motor control/impaired postural control/decreased ROM/impaired coordination/impaired sensory feedback

## 2017-07-03 NOTE — OCCUPATIONAL THERAPY INITIAL EVALUATION ADULT - IMPAIRMENTS CONTRIBUTING IMPAIRED BED MOBILITY, REHAB EVAL
impaired balance/decreased strength/impaired postural control/impaired motor control/scissoring/cognition/impaired sensory feedback/pain/decreased flexibility/decreased sensation/impaired coordination/decreased ROM/narrow base of support

## 2017-07-03 NOTE — SWALLOW BEDSIDE ASSESSMENT ADULT - SWALLOW EVAL: DIAGNOSIS
Pt presented with oropharyngeal phases of swallow marked by reduced cognition therefore limiting the evaluation however noted baseline cough, consistent acceptance of the bolus, good labial seal, slow oral transit time, and suspect delay in swallow trigger with reduced laryngeal elevation. Cough and wet vocal quality post thin liquid demonstrate possible signs of aspiration.

## 2017-07-03 NOTE — GOALS OF CARE CONVERSATION - PERSONAL ADVANCE DIRECTIVE - WHAT MATTERS MOST
Called  Tania no response. Awaiting call back.  Full code for now. Called  Tania no response. Spoke to Dominic and she stated that she is not DNR,  full code, and continue aggressive measures. Plan is to discharge home with 24 hour home health aid.

## 2017-07-03 NOTE — PROGRESS NOTE ADULT - SUBJECTIVE AND OBJECTIVE BOX
Initial Consultation Note  Requested by Name: Dr. Leigh  Date/ Time: 7/3/17  Reason for referral/ Consultation: Goals of care conversation    HPI:  Pt is a 89 yo lady with a pmhx of dementia, Alzheimer's chronic COPD, acute respiratory failure HTN, prior hx of aspiration pna and PE who presents to the ED with AMS and sob. Aide called EMS for tremor like activity and wet and coarse breathing. Pt found to have coarse lung sounds and sputum and was intubated in field for airway protection. No recent complaints of cough, abd pain, dysuria or headache. Pt in ED with breath sounds equal bilaterally, O2 sat 100%. Moving all 4 extremities.      PAST MEDICAL & SURGICAL HISTORY:  UTI (urinary tract infection)  PE (pulmonary thromboembolism)  Ovarian cancer  Alzheimer's Dementia with Behavioral Disturbance  Benign Essential Hypertension  H/O aortic valve replacement  Sigmoidoscopy Exam - Negative  S/P [facility] Biopsy of Cervix with vag laceration repair  S/P D&C, operative hysteroscopy      SOCIAL HISTORY: Admitted from: home [ ] SNF [ ] [facility] [ ] AL [ ]                                                                smoker [ ] past smoker [ ] non smoker[ ]                                                              no alcohol [ ] social alcohol [ ] alcohol [ ]  Surrogate/ HCP/ Guardian: [ ] YES [ ] NO. Name/ Phone#: Dominic (grand daughter) [phone]  Significant other/partner:                     Children:                         Protestant/Spirituality:    FAMILY HISTORY:  No pertinent family history in first degree relatives    Baseline ADLs (Prior to admission)  Independent:  Moderately [ ] fully [ ]   Dependent: moderately [ ] fully [x ]    ADVANCE DIRECTIVES:  [x ] YES [ ] NO   DNR: YES [ ] NO [ x ]  Completed on:                     MOLST: YES [ ] NO [x  ]  Completed on:  Living Will: YES [ ] NO [ x ]  Completed on:    Allergies  Haldol (Unknown)    Intolerances    MEDICATIONS  (STANDING):  methylPREDNISolone sodium succinate Injectable 40 milliGRAM(s) IV Push every 12 hours  heparin  Injectable 5000 Unit(s) SubCutaneous every 8 hours  ALBUTerol    0.083% 2.5 milliGRAM(s) Nebulizer every 6 hours  clonazePAM Tablet 0.25 milliGRAM(s) Oral two times a day  QUEtiapine 12.5 milliGRAM(s) Oral at bedtime  senna 2 Tablet(s) Oral daily    MEDICATIONS  (PRN):  acetaminophen   Tablet 650 milliGRAM(s) Oral every 6 hours PRN For Temp greater than 38 C (100.4 F)    OPIATE NAIVE: (Y/N)  I STOP REVIEWED: (Y/N) : (#-------------------)   Review of Systems:     PAIN : (Y/N) (0-10)  PAIN SITE : generalized     QUALITY/QUANTITY OF PAIN : Dull    PAIN RADIATING : no  SEVERITY :            FREQUENCY :  IMPACT ON ADLs :   Total care   DYSPNEA: Yes [  ] No [ x ] Mild [ ] Moderate [ x] Severe [ ]  NAUSEA/VOMITING: Yes [  ] No [x  ]  EXCESSIVE SECRETIONS: YES [  ] NO [x  ]  DEPRESSION: Yes [x  ] No [  ] Mild [ ]Moderate [ ] Severe [ ]  ANXIETY: Yes [  ] No [ x ]  AGITATION: Yes [  ] No [ x ]  CONSTIPATION : Yes [  ] No [x  ]  DIARRHEA : Yes [  ] No [x  ]  FRAILTY SYNDROME: Yes [ x ] No [  ]  FAILURE TO THRIVE: Yes [  ] No [ x ]  DEBILITY Yes [x] No [  ]  OTHER SYMPTOMS :  UNABLE TO OBTAIN DUE TO POOR MENTATION [ x ]    PHYSICAL EXAM:  T(F): 98.8 (07-03-17 @ 10:50), Max: 99.9 (07-02-17 @ 21:01)  HR: 102 (07-03-17 @ 13:01) (77 - 112)  BP: 164/98 (07-03-17 @ 13:01) (123/92 - 193/113)  RR: 18 (07-03-17 @ 12:03) (15 - 22)  SpO2: 100% (07-03-17 @ 13:01) (93% - 100%)  Wt(kg): --   WEIGHT :                      BMI:  I&O's Summary    02 Jul 2017 07:01  -  03 Jul 2017 07:00  --------------------------------------------------------  IN: 810 mL / OUT: 500 mL / NET: 310 mL    CAPILLARY BLOOD GLUCOSE    GENERAL: alert: Yes [ ] No [x  ]oriented x confused,  lethargic [x ] agitated [ ] cachexia [ ] nonverbal [ ] coma [ ]  HEENT: normal [x ] dry mouth [ ] Bipap [  ] ET tube/trach [ ]Vent [  ] excessive secretions [ ]  LUNGS: Comfortable [ x] tachypnea/ labored breathing[ ]   CV :  normal [ ] tachycardia [x ]bradycardia [  ]   GI : normal [x ] distended [ ] tender [ ] no BS [ ]  PEG /NG tube [ ]   : normal [ ] incontinent [x ] oliguria/anuria [ ] wren [ ]  MSK : normal [ ] weakness [ ] edema [ ] ambulatory [ ] bedbound /wheelchair bound [ x]   SKIN : normal [x ] pressure ulcer: Yes [  ] No [  ] Stage ______________ rash: Yes [  ] No [  ]    Functional Assessment:   Karnofsky Performance Score :<30%  Palliative Performance Status Version 2:         %    LABS:    I&O's Detail    02 Jul 2017 07:01  -  03 Jul 2017 07:00  --------------------------------------------------------  IN:    Jevity: 150 mL    Oral Fluid: 660 mL  Total IN: 810 mL    OUT:    Voided: 500 mL  Total OUT: 500 mL    Total NET: 310 mL    Assessment:   89y Female admitted with ACUTE RESPIRATORY FAILURE UNSPECIFIED WHETHER HYPOXIA OR    PROBLEM LIST :  PROBLEM/RECOMMENDATION: 1) Problem : Goals of care, counseling/ discussion.  Recommendation: Meet with patient's family and discussed GOC & Advanced care planning                                    Palliative care information /counseling given                                        Advanced Directives addressed   PROBLEM/ RECOMMENDATION:2)Problem :Resuscitation/ DNR/ DNI,   Recommendation: DNR/ DNI    PROBLEM/ RECOMMENDATION :3)Problem : Medical order for life sustaning treatment  Recommendation : MOLST Form discussed.    PROBLEM/RECOMMENDATION :4)Problem : Advanced care planning  Recommendation : Family meeting on:     PLAN:  REFERRALS  Hospice: YES [  ] NO [ x ]                         Palliative Med : YES [  ] NO [ x ]                         Unit SW/Case Mgmt: YES [ x ] NO [  ]                         : YES [  ] NO [x  ]                         Speech/Swallow: YES [  ] NO [ x ]                         Pain Management: YES [  ] NO [ x ]                         Nutrition: YES [ x ] NO [  ]                         Ethics: YES [  ] NO [x  ]                         PT/OT: YES [x  ] NO [  ]  Symptom Management Recommendations:   Family should consider on advanced care planning ( DNR/ DNI).  Home care with palliative care.

## 2017-07-03 NOTE — OCCUPATIONAL THERAPY INITIAL EVALUATION ADULT - PERTINENT HX OF CURRENT PROBLEM, REHAB EVAL
Pt presented to ER  and was DX with acute respiratory failure and hypoxia .  CXR on 7/2/17_ results confirm  small bilateral pleural effusion Pt presented to ER  and was DX with acute respiratory failure and hypoxia . CXR on 7/2/17_ results confirm  small bilateral pleural effusion

## 2017-07-03 NOTE — SWALLOW BEDSIDE ASSESSMENT ADULT - COMMENTS
CXR 7/2/2017: Impression: Interval extubation. Small bilateral pleural effusions. Overlying atelectasis and/or infiltrate cannot be excluded.    CT HEAD 6/29/2017: IMPRESSION: No acute intracranial hemorrhage, mass effect, or evidence of acute vascular territorial infarction. Cerebral volume loss and chronic microvascular disease as on 10/20/2016. If clinical symptoms persist or worsen more sensitive evaluation with brain MRI may be obtained, if no contraindications exist. CXR 7/2/2017: Impression: Interval extubation. Small bilateral pleural effusions. Overlying atelectasis and/or infiltrate cannot be excluded.    CT HEAD 6/29/2017: IMPRESSION: No acute intracranial hemorrhage, mass effect, or evidence of acute vascular territorial infarction. Cerebral volume loss and chronic microvascular disease as on 10/20/2016.

## 2017-07-03 NOTE — OCCUPATIONAL THERAPY INITIAL EVALUATION ADULT - ADDITIONAL COMMENTS
Pt was non ambulatory prior to admission and required total care . The scale below depicts a picture of the pt's current level of functioning. Barthel Index: Feeding Score__0____, Bathing Score___0___, Grooming Score___0__, Dressing Score___0__, Bowel Score___0__, Bladder Score____0__, Toilet Score___0__, Transfer Score___0___, Mobility Score____0_, Stairs Score____0_, Total Score_____.

## 2017-07-03 NOTE — OCCUPATIONAL THERAPY INITIAL EVALUATION ADULT - RANGE OF MOTION EXAMINATION, UPPER EXTREMITY
Right UE Active ROM was WFL (within functional limits)/Right UE Active Assistive ROM was WNL (within normal limits)/Left UE Passive ROM was WFL  (within functional limits)/Right UE Active Assistive ROM was WFL  (within functional limits)

## 2017-07-03 NOTE — OCCUPATIONAL THERAPY INITIAL EVALUATION ADULT - GENERAL OBSERVATIONS, REHAB EVAL
Pt was seen for initial OT encountered supine in bed, AA&Oxto self , confused and unable to follow commands. Pt presents with impaired balance , coordination, muscle tone,   muscle weakness , ADL and functional mobility skills;; left UE/LE are non functional ;pt is able to make needs & wants known Pt was seen for initial OT encountered supine in bed, AA&Oxto self, confused and unable to follow commands. Pt presents with impaired balance, coordination, muscle tone, muscle weakness, ADL and functional mobility skills;; left UE/LE are non functional ;pt is able to make needs & wants known

## 2017-07-03 NOTE — OCCUPATIONAL THERAPY INITIAL EVALUATION ADULT - PRECAUTIONS/LIMITATIONS, REHAB EVAL
cardiac precautions/Pt needs to be turned and positioned every 2 hours due to lack of mobility to prevent skin breakdown/fall precautions

## 2017-07-03 NOTE — GOALS OF CARE CONVERSATION - PERSONAL ADVANCE DIRECTIVE - CONVERSATION DETAILS
Pt is a 89 yo lady with a pmhx of dementia, Alzheimer's, chronic COPD, acute respiratory failure HTN, prior hx of aspiration pna and PE who presents to the ED with AMS and sob. Aide called EMS for tremor like activity and wet and coarse breathing. Pt found to have coarse lung sounds and sputum and was intubated in field for airway protection. Spoke to grand daughter Dominic and initiated goals of care conversation and she stated that she have HCP (Tania) to discuses goals of care.

## 2017-07-03 NOTE — SWALLOW BEDSIDE ASSESSMENT ADULT - SLP GENERAL OBSERVATIONS
Pt was seen bedside, alert and accepted po trials. Pt vocalizing however unable to answer autobiographical questions. She did not follow directions. Noted fleeting eye contact. Pt was seen bedside, alert and accepted po trials. Pt producing (meaningless) vocalizations however unable to answer autobiographical questions. She did not follow directions. Noted fleeting eye contact. Pt was seen bedside, alert and accepted po trials. Pt producing (meaningless) vocalizations and unable to answer autobiographical questions. She did not follow directions. Noted fleeting eye contact. Pt was seen bedside, alert and accepted po trials. Pt produced (meaningless) vocalizations and unable to answer autobiographical questions. She did not follow directions and noted fleeting eye contact.

## 2017-07-03 NOTE — SWALLOW BEDSIDE ASSESSMENT ADULT - SWALLOW EVAL: RECOMMENDED FEEDING/EATING TECHNIQUES
Check oral cavity for residue/pocketing prior to additional intake/small sips/bites/position upright (90 degrees)/allow for swallow between intakes/maintain upright posture during/after eating for 30 mins

## 2017-07-03 NOTE — PROGRESS NOTE ADULT - SUBJECTIVE AND OBJECTIVE BOX
Patient is a 89y old  Female who presents with a chief complaint of increasing O2 demands (29 Jun 2017 21:27)      INTERVAL HPI/OVERNIGHT EVENTS:    MEDICATIONS  (STANDING):  methylPREDNISolone sodium succinate Injectable 40 milliGRAM(s) IV Push every 12 hours  heparin  Injectable 5000 Unit(s) SubCutaneous every 8 hours  ALBUTerol    0.083% 2.5 milliGRAM(s) Nebulizer every 6 hours  chlorhexidine 4% Liquid 1 Application(s) Topical daily  clonazePAM Tablet 0.25 milliGRAM(s) Oral two times a day  QUEtiapine 12.5 milliGRAM(s) Oral at bedtime  senna 2 Tablet(s) Oral daily    MEDICATIONS  (PRN):  acetaminophen   Tablet 650 milliGRAM(s) Oral every 6 hours PRN For Temp greater than 38 C (100.4 F)      Allergies    Haldol (Unknown)    Intolerances        REVIEW OF SYSTEMS:  CONSTITUTIONAL: No fever, weight loss, or fatigue  EYES: No eye pain, visual disturbances, or discharge  ENMT:  No difficulty hearing, tinnitus, vertigo; No sinus or throat pain  NECK: No pain or stiffness  BREASTS: No pain, masses, or nipple discharge  RESPIRATORY: No cough, wheezing, chills or hemoptysis; No shortness of breath  CARDIOVASCULAR: No chest pain, palpitations, dizziness, or leg swelling  GASTROINTESTINAL: No abdominal or epigastric pain. No nausea, vomiting, or hematemesis; No diarrhea or constipation. No melena or hematochezia.  GENITOURINARY: No dysuria, frequency, hematuria, or incontinence  NEUROLOGICAL: No headaches, memory loss, loss of strength, numbness, or tremors  SKIN: No itching, burning, rashes, or lesions   LYMPH NODES: No enlarged glands  ENDOCRINE: No heat or cold intolerance; No hair loss  MUSCULOSKELETAL: No joint pain or swelling; No muscle, back, or extremity pain  PSYCHIATRIC: No depression, anxiety, mood swings, or difficulty sleeping  HEME/LYMPH: No easy bruising, or bleeding gums  ALLERGY AND IMMUNOLOGIC: No hives or eczema    Vital Signs Last 24 Hrs  T(C): 36.7 (03 Jul 2017 05:07), Max: 37.7 (02 Jul 2017 15:00)  T(F): 98 (03 Jul 2017 05:07), Max: 99.9 (02 Jul 2017 21:01)  HR: 96 (03 Jul 2017 08:20) (47 - 107)  BP: 134/98 (03 Jul 2017 05:07) (119/47 - 162/78)  BP(mean): 104 (02 Jul 2017 19:26) (65 - 106)  RR: 19 (03 Jul 2017 05:07) (15 - 23)  SpO2: 94% (03 Jul 2017 08:20) (93% - 100%)    PHYSICAL EXAM:  GENERAL: NAD, well-groomed, well-developed  HEAD:  Atraumatic, Normocephalic  EYES: EOMI, PERRLA, conjunctiva and sclera clear  ENMT: No tonsillar erythema, exudates, or enlargement; Moist mucous membranes, Good dentition, No lesions  NECK: Supple, No JVD, Normal thyroid  NERVOUS SYSTEM:  Alert & Oriented X3, Good concentration; Motor Strength 5/5 B/L upper and lower extremities; DTRs 2+ intact and symmetric  CHEST/LUNG: Clear to percussion bilaterally; No rales, rhonchi, wheezing, or rubs  HEART: Regular rate and rhythm; No murmurs, rubs, or gallops  ABDOMEN: Soft, Nontender, Nondistended; Bowel sounds present  EXTREMITIES:  2+ Peripheral Pulses, No clubbing, cyanosis, or edema  LYMPH: No lymphadenopathy noted  SKIN: No rashes or lesions    LABS:              CAPILLARY BLOOD GLUCOSE        CULTURES:    HEMOGLOBIN A1C:    CHOLESTEROL:  Cholesterol, Serum: 212 mg/dL (07-01 @ 09:23)  HDL Cholesterol, Serum: 55 mg/dL (07-01 @ 09:23)  Triglycerides, Serum: 99 mg/dL (07-01 @ 09:23)        RADIOLOGY & ADDITIONAL TESTS:

## 2017-07-04 LAB
ANION GAP SERPL CALC-SCNC: 10 MMOL/L — SIGNIFICANT CHANGE UP (ref 5–17)
BUN SERPL-MCNC: 22 MG/DL — SIGNIFICANT CHANGE UP (ref 7–23)
CALCIUM SERPL-MCNC: 9 MG/DL — SIGNIFICANT CHANGE UP (ref 8.5–10.1)
CHLORIDE SERPL-SCNC: 101 MMOL/L — SIGNIFICANT CHANGE UP (ref 96–108)
CO2 SERPL-SCNC: 35 MMOL/L — HIGH (ref 22–31)
CREAT SERPL-MCNC: 0.8 MG/DL — SIGNIFICANT CHANGE UP (ref 0.5–1.3)
CULTURE RESULTS: SIGNIFICANT CHANGE UP
CULTURE RESULTS: SIGNIFICANT CHANGE UP
GLUCOSE SERPL-MCNC: 101 MG/DL — HIGH (ref 70–99)
HCT VFR BLD CALC: 36.8 % — SIGNIFICANT CHANGE UP (ref 34.5–45)
HGB BLD-MCNC: 11.9 G/DL — SIGNIFICANT CHANGE UP (ref 11.5–15.5)
MCHC RBC-ENTMCNC: 27.8 PG — SIGNIFICANT CHANGE UP (ref 27–34)
MCHC RBC-ENTMCNC: 32.2 GM/DL — SIGNIFICANT CHANGE UP (ref 32–36)
MCV RBC AUTO: 86.4 FL — SIGNIFICANT CHANGE UP (ref 80–100)
PLATELET # BLD AUTO: 215 K/UL — SIGNIFICANT CHANGE UP (ref 150–400)
POTASSIUM SERPL-MCNC: 3.9 MMOL/L — SIGNIFICANT CHANGE UP (ref 3.5–5.3)
POTASSIUM SERPL-SCNC: 3.9 MMOL/L — SIGNIFICANT CHANGE UP (ref 3.5–5.3)
RBC # BLD: 4.27 M/UL — SIGNIFICANT CHANGE UP (ref 3.8–5.2)
RBC # FLD: 17.2 % — HIGH (ref 11–15)
SODIUM SERPL-SCNC: 146 MMOL/L — HIGH (ref 135–145)
SPECIMEN SOURCE: SIGNIFICANT CHANGE UP
SPECIMEN SOURCE: SIGNIFICANT CHANGE UP
WBC # BLD: 7.9 K/UL — SIGNIFICANT CHANGE UP (ref 3.8–10.5)
WBC # FLD AUTO: 7.9 K/UL — SIGNIFICANT CHANGE UP (ref 3.8–10.5)

## 2017-07-04 PROCEDURE — 99232 SBSQ HOSP IP/OBS MODERATE 35: CPT

## 2017-07-04 RX ADMIN — Medication 0.25 MILLIGRAM(S): at 06:10

## 2017-07-04 RX ADMIN — HEPARIN SODIUM 5000 UNIT(S): 5000 INJECTION INTRAVENOUS; SUBCUTANEOUS at 13:11

## 2017-07-04 RX ADMIN — Medication 40 MILLIGRAM(S): at 18:04

## 2017-07-04 RX ADMIN — ALBUTEROL 2.5 MILLIGRAM(S): 90 AEROSOL, METERED ORAL at 17:06

## 2017-07-04 RX ADMIN — HEPARIN SODIUM 5000 UNIT(S): 5000 INJECTION INTRAVENOUS; SUBCUTANEOUS at 06:11

## 2017-07-04 RX ADMIN — HEPARIN SODIUM 5000 UNIT(S): 5000 INJECTION INTRAVENOUS; SUBCUTANEOUS at 21:24

## 2017-07-04 RX ADMIN — AMLODIPINE BESYLATE 2.5 MILLIGRAM(S): 2.5 TABLET ORAL at 06:10

## 2017-07-04 RX ADMIN — SENNA PLUS 2 TABLET(S): 8.6 TABLET ORAL at 13:11

## 2017-07-04 RX ADMIN — Medication 40 MILLIGRAM(S): at 06:10

## 2017-07-04 RX ADMIN — ALBUTEROL 2.5 MILLIGRAM(S): 90 AEROSOL, METERED ORAL at 05:20

## 2017-07-04 RX ADMIN — ALBUTEROL 2.5 MILLIGRAM(S): 90 AEROSOL, METERED ORAL at 11:02

## 2017-07-04 RX ADMIN — QUETIAPINE FUMARATE 12.5 MILLIGRAM(S): 200 TABLET, FILM COATED ORAL at 21:24

## 2017-07-04 RX ADMIN — ALBUTEROL 2.5 MILLIGRAM(S): 90 AEROSOL, METERED ORAL at 00:00

## 2017-07-04 NOTE — PROGRESS NOTE ADULT - SUBJECTIVE AND OBJECTIVE BOX
Chief Complaint:  Patient is a 89y old  Female who presents with a chief complaint of increasing O2 demands (29 Jun 2017 21:27)      HPI:   Pt is a 89 yo lady with a pmhx of dementia, alzheimers, chronic COPD, acute respiratory failure HTN, prior hx of aspiration pna and PE who presents to the ED with AMS and sob. Aide called EMS for tremor like activity and wet and coarse breathing. Pt found to have coarse lung sounds and sputum and was intubated in field for airway protection. No recent complaints of cough, abd pain, dysuria or headache. Pt in ED with breath sounds equal bilaterally, O2 sat 100%. Moving all 4 extremities. Seems to be doing better today. Resting now.    Review of Systems:  Unable do to dementia/confusion.    Physical Exam:  Vital Signs Last 24 Hrs  T(C): 36.9 (04 Jul 2017 11:23), Max: 37.3 (03 Jul 2017 16:50)  T(F): 98.4 (04 Jul 2017 11:23), Max: 99.2 (03 Jul 2017 16:50)  HR: 91 (04 Jul 2017 11:23) (61 - 102)  BP: 140/83 (04 Jul 2017 11:23) (140/83 - 195/109)  RR: 17 (04 Jul 2017 11:23) (17 - 17)  SpO2: 96% (04 Jul 2017 11:23) (95% - 100%)    Tele: SR, Second degree AVB Mobitz type one  General:  Appears stated age, well-groomed, well-nourished, no distress  HEENT:  NC/AT, patent nares w/ pink mucosa, OP clear w/o lesions, conjunctivae clear, no thyromegaly, nodules, adenopathy, no JVD  Chest:  Full & symmetric excursion, no increased effort, breath sounds clear  Cardiovascular:  Regular rhythm, S1, S2, no murmur/rub/S3/S4, no carotid/femoral/abdominal bruit, radial/pedal pulses 2+  Abdomen:  Soft, non-tender, non-distended, normoactive bowel sounds  Extremities: no edema.  Skin:  No rash/erythema/ecchymoses/petechiae/wounds/abscess/warm/dry  Musculoskeletal:  N/A.  Neuro/Psych:  Confused.     Laboratory:                        11.9   7.9   )-----------( 215      ( 04 Jul 2017 07:09 )             36.8     07-04    146<H>  |  101  |  22  ----------------------------<  101<H>  3.9   |  35<H>  |  0.80    Ca    9.0      04 Jul 2017 07:09      Imaging:    < from: Xray Chest 1 View AP/PA. (07.02.17 @ 07:00) >    EXAM:  CHEST SINGLE VIEW                            PROCEDURE DATE:  07/02/2017        INTERPRETATION:  History: Wheezing, COPD, chest extubated.    Findings: Frontal chest.    Comparison: 7/1/2017.    Interval extubation. NG tube again noted. Multiple EKG clips and wires as   well as oxygen facemask and tubing overlie the chest.    Heart size may be exaggerated by AP technique. Calcified aorta. Hazy   opacity at the lung bases, greater on the left; effusion, atelectasis   and/or infiltrate. Scoliosis.    Impression:    Interval extubation.    Small bilateral pleural effusions. Overlying atelectasis and/or   infiltrate cannot be excluded.    < end of copied text >      < from: CT Angio Chest w/ IV Cont (06.29.17 @ 13:46) >    EXAM:  CT ANGIO CHEST (W)AW IC                            PROCEDURE DATE:  06/29/2017        INTERPRETATION:  CLINICAL INFORMATION: Shortness of breath    COMPARISON: 3/20/2017    PROCEDURE:     Serial axial sections through the chest were obtainedfollowing   administration of nonionic intravenous contrast utilizing pulmonary   embolism protocol. Sagittal and coronal reformats were then generated   from the axial images. 3-D maximal intensity projection reconstructions   were performed on an independent workstation.    100 mls of Omnipaque 350 was administered intravenously without   complication and 0 mls were discarded.    FINDINGS:     There is an endotracheal tube in place with tip above the mathew.    PULMONARY ARTERIES: The bolus is of suboptimal quality for diagnosis of   pulmonary embolism. The study is nondiagnostic for evaluation of the   pulmonary arteries.    LUNG AND LARGE AIRWAYS: Emphysema and bronchiectasis. Stable bilateral   upper lobe pleural parenchymal thickening and right upper lobe cavity   (1.1 cm). Basilar atelectasis.  PLEURA: Trace bilateral pleural effusions.  VESSELS: Atherosclerotic changes of the aorta and coronary arteries. No   thoracic aortic aneurysm or dissection.  HEART: Borderline enlarged. No pericardial effusion.  MEDIASTINUM AND KHANH: Mild mediastinal adenopathy, stable.  CHEST WALL AND LOWER NECK: Within normal limits.    UPPER ABDOMEN: Small hiatal hernia. Adrenal thickening. Renal cortical   scarring. Nonspecific perinephric fluid.    BONES: Shoulder and spine degenerative changes. Convex right thoracic   scoliosis.    IMPRESSION:     Nondiagnostic for evaluation of pulmonary arteries.  Emphysema.    < end of copied text >    Assessment:  Pt is a 89 yo lady with a pmhx of dementia, alzheimers, chronic COPD, acute respiratory failure HTN, prior hx of aspiration pna and PE who presents to the ED with AMS and sob. Aide called EMS for tremor like activity and wet and coarse breathing. Pt found to have coarse lung sounds and sputum and was intubated in field for airway protection. No recent complaints of cough, abd pain, dysuria or headache. Pt in ED with breath sounds equal bilaterally, O2 sat 100%. Moving all 4 extremities.  Transferred to CCU; hypoxic and bradycardic.    Plan:       Echocardiogram pending.    Con't with:    methylPREDNISolone sodium succinate Injectable 40 milliGRAM(s) IV Push every 12 hours  heparin  Injectable 5000 Unit(s) SubCutaneous every 8 hours  ALBUTerol    0.083% 2.5 milliGRAM(s) Nebulizer every 6 hours  clonazePAM Tablet 0.25 milliGRAM(s) Oral two times a day  QUEtiapine 12.5 milliGRAM(s) Oral at bedtime  senna 2 Tablet(s) Oral daily  amLODIPine   Tablet 2.5 milliGRAM(s) Oral daily    Again, she is not a good pacemaker candidate.    Conservative care/palliative care management.    Sharad Fournier MD, FACC, BEULAH, JAY, FACP  Director, Heart Failure Services  [facility]  , Department of Cardiology  [facility]

## 2017-07-04 NOTE — PROGRESS NOTE ADULT - SUBJECTIVE AND OBJECTIVE BOX
Patient is a 89y old  Female who presents with a chief complaint of increasing O2 demands (29 Jun 2017 21:27)      INTERVAL HPI/OVERNIGHT EVENTS:  pt on BIPAP  MEDICATIONS  (STANDING):  methylPREDNISolone sodium succinate Injectable 40 milliGRAM(s) IV Push every 12 hours  heparin  Injectable 5000 Unit(s) SubCutaneous every 8 hours  ALBUTerol    0.083% 2.5 milliGRAM(s) Nebulizer every 6 hours  clonazePAM Tablet 0.25 milliGRAM(s) Oral two times a day  QUEtiapine 12.5 milliGRAM(s) Oral at bedtime  senna 2 Tablet(s) Oral daily  amLODIPine   Tablet 2.5 milliGRAM(s) Oral daily    MEDICATIONS  (PRN):  acetaminophen   Tablet 650 milliGRAM(s) Oral every 6 hours PRN For Temp greater than 38 C (100.4 F)      Allergies    Haldol (Unknown)    Intolerances        REVIEW OF SYSTEMS:  CONSTITUTIONAL: No fever, weight loss, or fatigue  EYES: No eye pain, visual disturbances, or discharge  ENMT:  No difficulty hearing, tinnitus, vertigo; No sinus or throat pain  NECK: No pain or stiffness  BREASTS: No pain, masses, or nipple discharge  RESPIRATORY: No cough, wheezing, chills or hemoptysis; No shortness of breath  CARDIOVASCULAR: No chest pain, palpitations, dizziness, or leg swelling  GASTROINTESTINAL: No abdominal or epigastric pain. No nausea, vomiting, or hematemesis; No diarrhea or constipation. No melena or hematochezia.  GENITOURINARY: No dysuria, frequency, hematuria, or incontinence  NEUROLOGICAL: No headaches, memory loss, loss of strength, numbness, or tremors  SKIN: No itching, burning, rashes, or lesions   LYMPH NODES: No enlarged glands  ENDOCRINE: No heat or cold intolerance; No hair loss  MUSCULOSKELETAL: No joint pain or swelling; No muscle, back, or extremity pain  PSYCHIATRIC: No depression, anxiety, mood swings, or difficulty sleeping  HEME/LYMPH: No easy bruising, or bleeding gums  ALLERGY AND IMMUNOLOGIC: No hives or eczema    Vital Signs Last 24 Hrs  T(C): 36.3 (04 Jul 2017 05:43), Max: 37.3 (03 Jul 2017 16:50)  T(F): 97.4 (04 Jul 2017 05:43), Max: 99.2 (03 Jul 2017 16:50)  HR: 61 (04 Jul 2017 08:30) (61 - 112)  BP: 157/114 (04 Jul 2017 05:43) (146/104 - 195/109)  BP(mean): --  RR: 17 (04 Jul 2017 05:43) (17 - 18)  SpO2: 96% (04 Jul 2017 08:30) (95% - 100%)    PHYSICAL EXAM:  GENERAL: NAD, well-groomed, well-developed  HEAD:  Atraumatic, Normocephalic  EYES: EOMI, PERRLA, conjunctiva and sclera clear  ENMT: No tonsillar erythema, exudates, or enlargement; Moist mucous membranes, Good dentition, No lesions  NECK: Supple, No JVD, Normal thyroid  NERVOUS SYSTEM:  Alert & Oriented X3, Good concentration; Motor Strength 5/5 B/L upper and lower extremities; DTRs 2+ intact and symmetric  CHEST/LUNG: Clear to percussion bilaterally; No rales, rhonchi, wheezing, or rubs  HEART: Regular rate and rhythm; No murmurs, rubs, or gallops  ABDOMEN: Soft, Nontender, Nondistended; Bowel sounds present  EXTREMITIES:  2+ Peripheral Pulses, No clubbing, cyanosis, or edema  LYMPH: No lymphadenopathy noted  SKIN: No rashes or lesions    LABS:                        11.9   7.9   )-----------( 215      ( 04 Jul 2017 07:09 )             36.8     07-04    146<H>  |  101  |  22  ----------------------------<  101<H>  3.9   |  35<H>  |  0.80    Ca    9.0      04 Jul 2017 07:09          CAPILLARY BLOOD GLUCOSE        CULTURES:    HEMOGLOBIN A1C:    CHOLESTEROL:        RADIOLOGY & ADDITIONAL TESTS:

## 2017-07-05 LAB
ANION GAP SERPL CALC-SCNC: 7 MMOL/L — SIGNIFICANT CHANGE UP (ref 5–17)
BUN SERPL-MCNC: 28 MG/DL — HIGH (ref 7–23)
CALCIUM SERPL-MCNC: 8.6 MG/DL — SIGNIFICANT CHANGE UP (ref 8.5–10.1)
CHLORIDE SERPL-SCNC: 102 MMOL/L — SIGNIFICANT CHANGE UP (ref 96–108)
CO2 SERPL-SCNC: 37 MMOL/L — HIGH (ref 22–31)
CREAT SERPL-MCNC: 0.72 MG/DL — SIGNIFICANT CHANGE UP (ref 0.5–1.3)
GLUCOSE SERPL-MCNC: 103 MG/DL — HIGH (ref 70–99)
HCT VFR BLD CALC: 35.2 % — SIGNIFICANT CHANGE UP (ref 34.5–45)
HGB BLD-MCNC: 11.3 G/DL — LOW (ref 11.5–15.5)
MCHC RBC-ENTMCNC: 27.9 PG — SIGNIFICANT CHANGE UP (ref 27–34)
MCHC RBC-ENTMCNC: 32.1 GM/DL — SIGNIFICANT CHANGE UP (ref 32–36)
MCV RBC AUTO: 87 FL — SIGNIFICANT CHANGE UP (ref 80–100)
PLATELET # BLD AUTO: 232 K/UL — SIGNIFICANT CHANGE UP (ref 150–400)
POTASSIUM SERPL-MCNC: 4.1 MMOL/L — SIGNIFICANT CHANGE UP (ref 3.5–5.3)
POTASSIUM SERPL-SCNC: 4.1 MMOL/L — SIGNIFICANT CHANGE UP (ref 3.5–5.3)
RBC # BLD: 4.05 M/UL — SIGNIFICANT CHANGE UP (ref 3.8–5.2)
RBC # FLD: 17.8 % — HIGH (ref 11–15)
SODIUM SERPL-SCNC: 146 MMOL/L — HIGH (ref 135–145)
WBC # BLD: 8.1 K/UL — SIGNIFICANT CHANGE UP (ref 3.8–10.5)
WBC # FLD AUTO: 8.1 K/UL — SIGNIFICANT CHANGE UP (ref 3.8–10.5)

## 2017-07-05 PROCEDURE — 99232 SBSQ HOSP IP/OBS MODERATE 35: CPT

## 2017-07-05 RX ORDER — SODIUM CHLORIDE 9 MG/ML
1000 INJECTION, SOLUTION INTRAVENOUS
Qty: 0 | Refills: 0 | Status: DISCONTINUED | OUTPATIENT
Start: 2017-07-05 | End: 2017-07-07

## 2017-07-05 RX ADMIN — SODIUM CHLORIDE 50 MILLILITER(S): 9 INJECTION, SOLUTION INTRAVENOUS at 12:51

## 2017-07-05 RX ADMIN — HEPARIN SODIUM 5000 UNIT(S): 5000 INJECTION INTRAVENOUS; SUBCUTANEOUS at 06:04

## 2017-07-05 RX ADMIN — HEPARIN SODIUM 5000 UNIT(S): 5000 INJECTION INTRAVENOUS; SUBCUTANEOUS at 21:33

## 2017-07-05 RX ADMIN — AMLODIPINE BESYLATE 2.5 MILLIGRAM(S): 2.5 TABLET ORAL at 06:03

## 2017-07-05 RX ADMIN — ALBUTEROL 2.5 MILLIGRAM(S): 90 AEROSOL, METERED ORAL at 17:11

## 2017-07-05 RX ADMIN — ALBUTEROL 2.5 MILLIGRAM(S): 90 AEROSOL, METERED ORAL at 00:07

## 2017-07-05 RX ADMIN — ALBUTEROL 2.5 MILLIGRAM(S): 90 AEROSOL, METERED ORAL at 06:08

## 2017-07-05 RX ADMIN — QUETIAPINE FUMARATE 12.5 MILLIGRAM(S): 200 TABLET, FILM COATED ORAL at 21:33

## 2017-07-05 RX ADMIN — Medication 0.25 MILLIGRAM(S): at 06:03

## 2017-07-05 RX ADMIN — Medication 40 MILLIGRAM(S): at 06:03

## 2017-07-05 RX ADMIN — ALBUTEROL 2.5 MILLIGRAM(S): 90 AEROSOL, METERED ORAL at 11:13

## 2017-07-05 RX ADMIN — SENNA PLUS 2 TABLET(S): 8.6 TABLET ORAL at 11:43

## 2017-07-05 RX ADMIN — Medication 0.25 MILLIGRAM(S): at 17:24

## 2017-07-05 RX ADMIN — HEPARIN SODIUM 5000 UNIT(S): 5000 INJECTION INTRAVENOUS; SUBCUTANEOUS at 13:00

## 2017-07-05 RX ADMIN — Medication 40 MILLIGRAM(S): at 17:26

## 2017-07-05 NOTE — PROGRESS NOTE ADULT - SUBJECTIVE AND OBJECTIVE BOX
Patient is a 89y old  Female who presents with a chief complaint of increasing O2 demands (29 Jun 2017 21:27)  HPI:   Pt is a 89 yo lady with a pmhx of dementia, alzheimers, chronic COPD, acute respiratory failure HTN, prior hx of aspiration pna and PE who presents to the ED with AMS and sob. Aide called EMS for tremor like activity and wet and coarse breathing. Pt found to have coarse lung sounds and sputum and was intubated in field for airway protection. No recent complaints of cough, abd pain, dysuria or headache. Pt in ED with breath sounds equal bilaterally, O2 sat 100%. Moving all 4 extremities. Seems to be doing better today. Resting now.    PAST MEDICAL & SURGICAL HISTORY:  UTI (urinary tract infection)  PE (pulmonary thromboembolism)  Ovarian cancer  Alzheimer's Dementia with Behavioral Disturbance  Benign Essential Hypertension  H/O aortic valve replacement  Sigmoidoscopy Exam - Negative  S/P [facility] Biopsy of Cervix with vag laceration repair  S/P D&C, operative hysteroscopy      INTERVAL HISTORY:  	  MEDICATIONS:  MEDICATIONS  (STANDING):  methylPREDNISolone sodium succinate Injectable 40 milliGRAM(s) IV Push every 12 hours  heparin  Injectable 5000 Unit(s) SubCutaneous every 8 hours  ALBUTerol    0.083% 2.5 milliGRAM(s) Nebulizer every 6 hours  clonazePAM Tablet 0.25 milliGRAM(s) Oral two times a day  QUEtiapine 12.5 milliGRAM(s) Oral at bedtime  senna 2 Tablet(s) Oral daily  amLODIPine   Tablet 2.5 milliGRAM(s) Oral daily  dextrose 5%. 1000 milliLiter(s) (50 mL/Hr) IV Continuous <Continuous>    MEDICATIONS  (PRN):  acetaminophen   Tablet 650 milliGRAM(s) Oral every 6 hours PRN For Temp greater than 38 C (100.4 F)      Vitals:  T(F): 98 (07-05-17 @ 05:27), Max: 99.4 (07-04-17 @ 17:02)  HR: 64 (07-05-17 @ 09:19) (63 - 91)  BP: 138/87 (07-05-17 @ 06:05) (138/84 - 174/86)  RR: 18 (07-05-17 @ 05:27) (16 - 18)  SpO2: 97% (07-05-17 @ 09:19) (96% - 100%)  Wt(kg): --    07-04 @ 07:01  -  07-05 @ 07:00  --------------------------------------------------------  IN:    Oral Fluid: 240 mL  Total IN: 240 mL    OUT:  Total OUT: 0 mL    Total NET: 240 mL      07-05 @ 07:01 - 07-05 @ 10:57  --------------------------------------------------------  IN:    Oral Fluid: 120 mL  Total IN: 120 mL    OUT:  Total OUT: 0 mL    Total NET: 120 mL        PHYSICAL EXAM:  Neuro: Awake, responsive  CV: S1 S2 RRR  Lungs: CTABL  GI: Soft, BS +, ND, NT  Extremities: No edema    TELEMETRY:  SB 1st degree HB occ progressing to 2nd degree, no pauses.  	  	    RADIOLOGY:   < from: Xray Chest 1 View AP/PA. (07.02.17 @ 07:00) >  EXAM:  CHEST SINGLE VIEW                            PROCEDURE DATE:  07/02/2017        INTERPRETATION:  History: Wheezing, COPD, chest extubated.    Findings: Frontal chest.    Comparison: 7/1/2017.    Interval extubation. NG tube again noted. Multiple EKG clips and wires as   well as oxygen facemask and tubing overlie the chest.    Heart size may be exaggerated by AP technique. Calcified aorta. Hazy   opacity at the lung bases, greater on the left; effusion, atelectasis   and/or infiltrate. Scoliosis.    Impression:    Interval extubation.    Small bilateral pleural effusions. Overlying atelectasis and/or   infiltrate cannot be excluded.    < end of copied text >      DIAGNOSTIC TESTING:    [ ] Echocardiogram: just completed.    LABS:	 	                          11.3   8.1   )-----------( 232      ( 05 Jul 2017 07:46 )             35.2 ,                       11.9   7.9   )-----------( 215      ( 04 Jul 2017 07:09 )             36.8 ,                       11.6   9.0   )-----------( 171      ( 03 Jul 2017 14:28 )             36.3   07-05    146<H>  |  102  |  28<H>  ----------------------------<  103<H>  4.1   |  37<H>  |  0.72    Ca    8.6      05 Jul 2017 07:46 Patient is a 89y old  Female who presents with a chief complaint of increasing O2 demands (2017 21:27)  HPI:   Pt is a 89 yo lady with a pmhx of dementia, alzheimers, chronic COPD, acute respiratory failure HTN, prior hx of aspiration pna and PE who presents to the ED with AMS and sob. Aide called EMS for tremor like activity and wet and coarse breathing. Pt found to have coarse lung sounds and sputum and was intubated in field for airway protection. No recent complaints of cough, abd pain, dysuria or headache. Pt in ED with breath sounds equal bilaterally, O2 sat 100%. Moving all 4 extremities. Seems to be doing better today. Resting now.    PAST MEDICAL & SURGICAL HISTORY:  UTI (urinary tract infection)  PE (pulmonary thromboembolism)  Ovarian cancer  Alzheimer's Dementia with Behavioral Disturbance  Benign Essential Hypertension  H/O aortic valve replacement  Sigmoidoscopy Exam - Negative  S/P [facility] Biopsy of Cervix with vag laceration repair  S/P D&C, operative hysteroscopy      INTERVAL HISTORY:  	  MEDICATIONS:  MEDICATIONS  (STANDING):  methylPREDNISolone sodium succinate Injectable 40 milliGRAM(s) IV Push every 12 hours  heparin  Injectable 5000 Unit(s) SubCutaneous every 8 hours  ALBUTerol    0.083% 2.5 milliGRAM(s) Nebulizer every 6 hours  clonazePAM Tablet 0.25 milliGRAM(s) Oral two times a day  QUEtiapine 12.5 milliGRAM(s) Oral at bedtime  senna 2 Tablet(s) Oral daily  amLODIPine   Tablet 2.5 milliGRAM(s) Oral daily  dextrose 5%. 1000 milliLiter(s) (50 mL/Hr) IV Continuous <Continuous>    MEDICATIONS  (PRN):  acetaminophen   Tablet 650 milliGRAM(s) Oral every 6 hours PRN For Temp greater than 38 C (100.4 F)      Vitals:  T(F): 98 (17 @ 05:27), Max: 99.4 (17 @ 17:02)  HR: 64 (17 @ 09:19) (63 - 91)  BP: 138/87 (17 @ 06:05) (138/84 - 174/86)  RR: 18 (17 @ 05:27) (16 - 18)  SpO2: 97% (17 @ 09:19) (96% - 100%)  Wt(kg): --     @ 07:  -   @ 07:00  --------------------------------------------------------  IN:    Oral Fluid: 240 mL  Total IN: 240 mL    OUT:  Total OUT: 0 mL    Total NET: 240 mL       @ 07:  -   @ 10:57  --------------------------------------------------------  IN:    Oral Fluid: 120 mL  Total IN: 120 mL    OUT:  Total OUT: 0 mL    Total NET: 120 mL        PHYSICAL EXAM:  Neuro: Awake, responsive  CV: S1 S2 RRR  Lungs: CTABL  GI: Soft, BS +, ND, NT  Extremities: No edema    TELEMETRY:  SB 1st degree HB occ progressing to 2nd degree, no pauses.  	  	    RADIOLOGY:   < from: Xray Chest 1 View AP/PA. (17 @ 07:00) >  EXAM:  CHEST SINGLE VIEW                            PROCEDURE DATE:  2017        INTERPRETATION:  History: Wheezing, COPD, chest extubated.    Findings: Frontal chest.    Comparison: 2017.    Interval extubation. NG tube again noted. Multiple EKG clips and wires as   well as oxygen facemask and tubing overlie the chest.    Heart size may be exaggerated by AP technique. Calcified aorta. Hazy   opacity at the lung bases, greater on the left; effusion, atelectasis   and/or infiltrate. Scoliosis.    Impression:    Interval extubation.    Small bilateral pleural effusions. Overlying atelectasis and/or   infiltrate cannot be excluded.    < end of copied text >      DIAGNOSTIC TESTING:        LABS:	 	                          11.3   8.1   )-----------( 232      ( 2017 07:46 )             35.2 ,                       11.9   7.9   )-----------( 215      ( 2017 07:09 )             36.8 ,                       11.6   9.0   )-----------( 171      ( 2017 14:28 )             36.3   07-05    146<H>  |  102  |  28<H>  ----------------------------<  103<H>  4.1   |  37<H>  |  0.72    Ca    8.6      2017 07:46      < from: TTE Echo Doppler w/o Cont (17 @ 09:59) >     EXAM:  TTE WO CON COMPLETE W DOPPL         PROCEDURE DATE:  2017        INTERPRETATION:  REPORT:    TRANSTHORACIC ECHOCARDIOGRAM REPORT           Patient Name:   KIN BREWSTER Patient Location: [facility] Rec #:  [id number]           Accession #:      [id number]  Account #:                           Height:           60.0 in 152.4 cm  YOB: 1926            Weight:           152.1 lb 69.00 kg  Patient Age:    89 years             BSA:              1.66 m²  Patient Gender: F                    BP:               138/87 mmHg        Date of Exam: 2017 9:59:47 AM  Sonographer:  GAVINO     Procedure:   2D Echo/Doppler/Color Doppler Complete.  Indications: Dyspnea, unspecified - R06.00; Acute respiratory failure,       unspecified whether with hypoxia or hypercapnia - J96.00  Diagnosis:   Nonrheumatic aortic (valve) insufficiency - I35.1           2D AND M-MODE MEASUREMENTS (normal ranges within parentheses):  Left Ventricle:                 Normal    Aorta/Left Atrium:           Normal  IVSd (2D):              1.06 cm (0.7-1.1) Aortic Root (2D):  3.39 cm   (2.4-3.7)  LVPWd (2D):             0.90 cm (0.7-1.1) Left Atrium (2D):  3.07 cm   (1.9-4.0)  LVIDd (2D):             3.10 cm (3.4-5.7) Right Ventricle:  LVIDs (2D):             2.02 cm           TAPSE:           1.91 cm  LV FS (2D):             35.0 %  (>25%)  Relative Wall Thickness 0.58    (<0.42)     LV DIASTOLIC FUNCTION:  MV Peak E: 0.55 m/s Decel Time: 268 msec  MV Peak A: 0.77 m/s  E/A Ratio:0.72     SPECTRAL DOPPLER ANALYSIS (where applicable):  Mitral Valve:  MV P1/2 Time: 77.73 msec  MV Area, PHT: 2.83 cm²     Aortic Valve: AoV Max Pedro: 1.23 m/s AoV Peak P.1 mmHg AoV Mean PG:   3.3 mmHg     LVOT Vmax: 1.19 m/s LVOT VTI: 0.257 m LVOT Diameter:     Tricuspid Valve and PA/RV Systolic Pressure: TR Max Velocity: 2.42 m/s   RA Pressure: 5 mmHg RVSP/PASP: 28.4 mmHg        PHYSICIAN INTERPRETATION:  Left Ventricle: Normal left ventricular size and wall thicknesses, with   normal systolic and diastolic function. The left ventricular internal   cavity size is normal. Left ventricular wall thickness is normal.  Global LV systolic function was normal. Left ventricular ejection   fraction, by visual estimation, is 60 to 65%. Spectral Doppler shows   normal pattern of LV diastolic filling. Normal LV filling pressures.  Right Ventricle: Normal right ventricular size and function.  Left Atrium: The left atrium is normal in size.  Right Atrium: The right atrium is normal in size. The right atrial   pressure is normal. The right atrium is normal in size.  Pericardium: There is no evidence of pericardial effusion.  Mitral Valve: The mitral valve is normal in structure. Mitral leaflet   mobility is normal. No evidence of mitral valve regurgitation is seen.  Tricuspid Valve: Structurally normal tricuspid valve, with normal leaflet   excursion. The tricuspid valve is normal in structure. Trivial tricuspid   regurgitation is visualized.  Aortic Valve: The aortic valve is normal. Sclerotic aortic valve with   normal opening. Trivial aortic valve regurgitation is seen.  Pulmonic Valve: Structurally normal pulmonic valve, with normal leaflet   excursion. No indication of pulmonic valve regurgitation.  Aorta: The aortic root is normal in size and structure.  Pulmonary Artery: The main pulmonary artery is normal in size. Pulmonary   hypertension is absent.        Summary:   1. Left ventricular ejection fraction, by visual estimation, is 60 to   65%.   2. Normal global left ventricular systolic function.   3. Normal left ventricular internal cavity size.   4. Normal left ventricular size and wall thicknesses, with normal   systolic and diastolic function.   5. Normal right ventricular size and function.   6. The left atrium is normal in size.   7. The right atrium is normal in size.   8. There is no evidence of pericardial effusion.   9. No evidence of mitral valve regurgitation.  10. Structurally normal tricuspid valve, with normal leaflet excursion.  11. Sclerotic aortic valve with normal opening.  12. Structurally normal pulmonic valve, with normal leaflet excursion.  13. Pulmonary hypertension is absent.  14. The main pulmonary artery is normal in size.     [id number] Gaye Prado MD  Electronically signed on 2017 at 11:19:45 AM          *** Final ***                    GAYE OROURKE M.D.; Attending Cardiologist  This document has been electronically signed. 2017  9:59AM                < end of copied text >

## 2017-07-05 NOTE — PROGRESS NOTE ADULT - SUBJECTIVE AND OBJECTIVE BOX
Patient is a 89y old  Female who presents with a chief complaint of increasing O2 demands (29 Jun 2017 21:27)      INTERVAL HPI/OVERNIGHT EVENTS:  pt is doing better  MEDICATIONS  (STANDING):  methylPREDNISolone sodium succinate Injectable 40 milliGRAM(s) IV Push every 12 hours  heparin  Injectable 5000 Unit(s) SubCutaneous every 8 hours  ALBUTerol    0.083% 2.5 milliGRAM(s) Nebulizer every 6 hours  clonazePAM Tablet 0.25 milliGRAM(s) Oral two times a day  QUEtiapine 12.5 milliGRAM(s) Oral at bedtime  senna 2 Tablet(s) Oral daily  amLODIPine   Tablet 2.5 milliGRAM(s) Oral daily    MEDICATIONS  (PRN):  acetaminophen   Tablet 650 milliGRAM(s) Oral every 6 hours PRN For Temp greater than 38 C (100.4 F)      Allergies    Haldol (Unknown)    Intolerances        REVIEW OF SYSTEMS:  CONSTITUTIONAL: No fever, weight loss, or fatigue  EYES: No eye pain, visual disturbances, or discharge  ENMT:  No difficulty hearing, tinnitus, vertigo; No sinus or throat pain  NECK: No pain or stiffness  BREASTS: No pain, masses, or nipple discharge  RESPIRATORY: No cough, wheezing, chills or hemoptysis; No shortness of breath  CARDIOVASCULAR: No chest pain, palpitations, dizziness, or leg swelling  GASTROINTESTINAL: No abdominal or epigastric pain. No nausea, vomiting, or hematemesis; No diarrhea or constipation. No melena or hematochezia.  GENITOURINARY: No dysuria, frequency, hematuria, or incontinence  NEUROLOGICAL: No headaches, memory loss, loss of strength, numbness, or tremors  SKIN: No itching, burning, rashes, or lesions   LYMPH NODES: No enlarged glands  ENDOCRINE: No heat or cold intolerance; No hair loss  MUSCULOSKELETAL: No joint pain or swelling; No muscle, back, or extremity pain  PSYCHIATRIC: No depression, anxiety, mood swings, or difficulty sleeping  HEME/LYMPH: No easy bruising, or bleeding gums  ALLERGY AND IMMUNOLOGIC: No hives or eczema    Vital Signs Last 24 Hrs  T(C): 36.7 (05 Jul 2017 05:27), Max: 37.4 (04 Jul 2017 17:02)  T(F): 98 (05 Jul 2017 05:27), Max: 99.4 (04 Jul 2017 17:02)  HR: 64 (05 Jul 2017 09:19) (63 - 91)  BP: 138/87 (05 Jul 2017 06:05) (138/84 - 174/86)  BP(mean): --  RR: 18 (05 Jul 2017 05:27) (16 - 18)  SpO2: 97% (05 Jul 2017 09:19) (96% - 100%)    PHYSICAL EXAM:  GENERAL: NAD, well-groomed, well-developed  HEAD:  Atraumatic, Normocephalic  EYES: EOMI, PERRLA, conjunctiva and sclera clear  ENMT: No tonsillar erythema, exudates, or enlargement; Moist mucous membranes, Good dentition, No lesions  NECK: Supple, No JVD, Normal thyroid  NERVOUS SYSTEM:  Alert & Oriented X3, Good concentration; Motor Strength 5/5 B/L upper and lower extremities; DTRs 2+ intact and symmetric  CHEST/LUNG: Clear to percussion bilaterally; No rales, rhonchi, wheezing, or rubs  HEART: Regular rate and rhythm; No murmurs, rubs, or gallops  ABDOMEN: Soft, Nontender, Nondistended; Bowel sounds present  EXTREMITIES:  2+ Peripheral Pulses, No clubbing, cyanosis, or edema  LYMPH: No lymphadenopathy noted  SKIN: No rashes or lesions    LABS:                        11.3   8.1   )-----------( 232      ( 05 Jul 2017 07:46 )             35.2     07-05    146<H>  |  102  |  28<H>  ----------------------------<  103<H>  4.1   |  37<H>  |  0.72    Ca    8.6      05 Jul 2017 07:46          CAPILLARY BLOOD GLUCOSE        CULTURES:    HEMOGLOBIN A1C:    CHOLESTEROL:        RADIOLOGY & ADDITIONAL TESTS:

## 2017-07-05 NOTE — PROGRESS NOTE ADULT - ASSESSMENT
Overall resp status is improving. Bipap prn. Taper steroids. Now off Abx.  ECHO just completed.  HR on Tele has been stable without progression to 2-3 degree HB. She is not a good pacemaker candidate.    Conservative care/palliative care management. Overall resp status is improving. Bipap prn. Taper steroids. Now off Abx.  ECHO grossly normal.  HR on Tele has been stable without progression to 2-3 degree HB. She is not a good pacemaker candidate.    Conservative care/palliative care management.

## 2017-07-06 LAB
ANION GAP SERPL CALC-SCNC: 8 MMOL/L — SIGNIFICANT CHANGE UP (ref 5–17)
BUN SERPL-MCNC: 21 MG/DL — SIGNIFICANT CHANGE UP (ref 7–23)
CALCIUM SERPL-MCNC: 8.9 MG/DL — SIGNIFICANT CHANGE UP (ref 8.5–10.1)
CHLORIDE SERPL-SCNC: 97 MMOL/L — SIGNIFICANT CHANGE UP (ref 96–108)
CO2 SERPL-SCNC: 32 MMOL/L — HIGH (ref 22–31)
CREAT SERPL-MCNC: 0.77 MG/DL — SIGNIFICANT CHANGE UP (ref 0.5–1.3)
GLUCOSE SERPL-MCNC: 89 MG/DL — SIGNIFICANT CHANGE UP (ref 70–99)
HCT VFR BLD CALC: 39.7 % — SIGNIFICANT CHANGE UP (ref 34.5–45)
HGB BLD-MCNC: 13 G/DL — SIGNIFICANT CHANGE UP (ref 11.5–15.5)
MCHC RBC-ENTMCNC: 27.9 PG — SIGNIFICANT CHANGE UP (ref 27–34)
MCHC RBC-ENTMCNC: 32.8 GM/DL — SIGNIFICANT CHANGE UP (ref 32–36)
MCV RBC AUTO: 85.1 FL — SIGNIFICANT CHANGE UP (ref 80–100)
PLATELET # BLD AUTO: 244 K/UL — SIGNIFICANT CHANGE UP (ref 150–400)
POTASSIUM SERPL-MCNC: 3.9 MMOL/L — SIGNIFICANT CHANGE UP (ref 3.5–5.3)
POTASSIUM SERPL-SCNC: 3.9 MMOL/L — SIGNIFICANT CHANGE UP (ref 3.5–5.3)
RBC # BLD: 4.66 M/UL — SIGNIFICANT CHANGE UP (ref 3.8–5.2)
RBC # FLD: 17.5 % — HIGH (ref 11–15)
SODIUM SERPL-SCNC: 137 MMOL/L — SIGNIFICANT CHANGE UP (ref 135–145)
WBC # BLD: 12.1 K/UL — HIGH (ref 3.8–10.5)
WBC # FLD AUTO: 12.1 K/UL — HIGH (ref 3.8–10.5)

## 2017-07-06 RX ADMIN — HEPARIN SODIUM 5000 UNIT(S): 5000 INJECTION INTRAVENOUS; SUBCUTANEOUS at 14:22

## 2017-07-06 RX ADMIN — ALBUTEROL 2.5 MILLIGRAM(S): 90 AEROSOL, METERED ORAL at 00:15

## 2017-07-06 RX ADMIN — Medication 0.25 MILLIGRAM(S): at 18:24

## 2017-07-06 RX ADMIN — AMLODIPINE BESYLATE 2.5 MILLIGRAM(S): 2.5 TABLET ORAL at 06:49

## 2017-07-06 RX ADMIN — ALBUTEROL 2.5 MILLIGRAM(S): 90 AEROSOL, METERED ORAL at 12:00

## 2017-07-06 RX ADMIN — SENNA PLUS 2 TABLET(S): 8.6 TABLET ORAL at 12:30

## 2017-07-06 RX ADMIN — HEPARIN SODIUM 5000 UNIT(S): 5000 INJECTION INTRAVENOUS; SUBCUTANEOUS at 21:38

## 2017-07-06 RX ADMIN — Medication 0.25 MILLIGRAM(S): at 06:32

## 2017-07-06 RX ADMIN — QUETIAPINE FUMARATE 12.5 MILLIGRAM(S): 200 TABLET, FILM COATED ORAL at 21:38

## 2017-07-06 RX ADMIN — ALBUTEROL 2.5 MILLIGRAM(S): 90 AEROSOL, METERED ORAL at 06:01

## 2017-07-06 RX ADMIN — HEPARIN SODIUM 5000 UNIT(S): 5000 INJECTION INTRAVENOUS; SUBCUTANEOUS at 06:32

## 2017-07-06 RX ADMIN — SODIUM CHLORIDE 50 MILLILITER(S): 9 INJECTION, SOLUTION INTRAVENOUS at 06:51

## 2017-07-06 RX ADMIN — Medication 40 MILLIGRAM(S): at 18:25

## 2017-07-06 RX ADMIN — Medication 40 MILLIGRAM(S): at 06:32

## 2017-07-06 RX ADMIN — ALBUTEROL 2.5 MILLIGRAM(S): 90 AEROSOL, METERED ORAL at 17:02

## 2017-07-06 NOTE — PROGRESS NOTE ADULT - PROBLEM SELECTOR PROBLEM 2
Acute respiratory failure with hypoxia
Acute respiratory failure with hypoxia
Alzheimer's dementia with behavioral disturbance, unspecified timing of dementia onset
Benign Essential Hypertension
PNA (pneumonia)
Urinary tract infection without hematuria, site unspecified
Urinary tract infection without hematuria, site unspecified
PNA (pneumonia)

## 2017-07-06 NOTE — PROGRESS NOTE ADULT - SUBJECTIVE AND OBJECTIVE BOX
· Subjective and Objective: 	  Initial Consultation Note  Requested by Name: Dr. Leigh  Date/ Time: 7/3/17  Reason for referral/ Consultation: Goals of care conversation    HPI:  Pt is a 89 yo lady with a pmhx of dementia, Alzheimer's chronic COPD, acute respiratory failure HTN, prior hx of aspiration pna and PE who presents to the ED with AMS and sob. Aide called EMS for tremor like activity and wet and coarse breathing. Pt found to have coarse lung sounds and sputum and was intubated in field for airway protection. No recent complaints of cough, abd pain, dysuria or headache. Pt in ED with breath sounds equal bilaterally, O2 sat 100%. Moving all 4 extremities.      PAST MEDICAL & SURGICAL HISTORY:  UTI (urinary tract infection)  PE (pulmonary thromboembolism)  Ovarian cancer  Alzheimer's Dementia with Behavioral Disturbance  Benign Essential Hypertension  H/O aortic valve replacement  Sigmoidoscopy Exam - Negative  S/P [facility] Biopsy of Cervix with vag laceration repair  S/P D&C, operative hysteroscopy      SOCIAL HISTORY: Admitted from: home [ ] SNF [ ] [facility] [ ] AL [ ]                                                                smoker [ ] past smoker [ ] non smoker[ ]                                                              no alcohol [ ] social alcohol [ ] alcohol [ ]  Surrogate/ HCP/ Guardian: [ ] YES [ ] NO. Name/ Phone#: Dominic (grand daughter) [phone]  Significant other/partner:                     Children:                         Buddhist/Spirituality:    FAMILY HISTORY:  No pertinent family history in first degree relatives    Baseline ADLs (Prior to admission)  Independent:  Moderately [ ] fully [ ]   Dependent: moderately [ ] fully [x ]    ADVANCE DIRECTIVES:  [x ] YES [ ] NO   DNR: YES [ ] NO [ x ]  Completed on:                     MOLST: YES [ ] NO [x  ]  Completed on:  Living Will: YES [ ] NO [ x ]  Completed on:    Allergies  Haldol (Unknown)    Intolerances    MEDICATIONS  (STANDING):  methylPREDNISolone sodium succinate Injectable 40 milliGRAM(s) IV Push every 12 hours  heparin  Injectable 5000 Unit(s) SubCutaneous every 8 hours  ALBUTerol    0.083% 2.5 milliGRAM(s) Nebulizer every 6 hours  clonazePAM Tablet 0.25 milliGRAM(s) Oral two times a day  QUEtiapine 12.5 milliGRAM(s) Oral at bedtime  senna 2 Tablet(s) Oral daily    MEDICATIONS  (PRN):  acetaminophen   Tablet 650 milliGRAM(s) Oral every 6 hours PRN For Temp greater than 38 C (100.4 F)    OPIATE NAIVE: (Y/N)  I STOP REVIEWED: (Y/N) : (#-------------------)   Review of Systems:     PAIN : (Y/N) (0-10)  PAIN SITE : generalized     QUALITY/QUANTITY OF PAIN : Dull    PAIN RADIATING : no  SEVERITY :            FREQUENCY :  IMPACT ON ADLs :   Total care   DYSPNEA: Yes [  ] No [ x ] Mild [ ] Moderate [ x] Severe [ ]  NAUSEA/VOMITING: Yes [  ] No [x  ]  EXCESSIVE SECRETIONS: YES [  ] NO [x  ]  DEPRESSION: Yes [x  ] No [  ] Mild [ ]Moderate [ ] Severe [ ]  ANXIETY: Yes [  ] No [ x ]  AGITATION: Yes [  ] No [ x ]  CONSTIPATION : Yes [  ] No [x  ]  DIARRHEA : Yes [  ] No [x  ]  FRAILTY SYNDROME: Yes [ x ] No [  ]  FAILURE TO THRIVE: Yes [  ] No [ x ]  DEBILITY Yes [x] No [  ]  OTHER SYMPTOMS :  UNABLE TO OBTAIN DUE TO POOR MENTATION [ x ]    PHYSICAL EXAM:  T(F): 98.8 (07-03-17 @ 10:50), Max: 99.9 (07-02-17 @ 21:01)  HR: 102 (07-03-17 @ 13:01) (77 - 112)  BP: 164/98 (07-03-17 @ 13:01) (123/92 - 193/113)  RR: 18 (07-03-17 @ 12:03) (15 - 22)  SpO2: 100% (07-03-17 @ 13:01) (93% - 100%)  Wt(kg): --   WEIGHT :                      BMI:  I&O's Summary    02 Jul 2017 07:01  -  03 Jul 2017 07:00  --------------------------------------------------------  IN: 810 mL / OUT: 500 mL / NET: 310 mL    CAPILLARY BLOOD GLUCOSE    GENERAL: alert: Yes [ ] No [x  ]oriented x confused,  lethargic [x ] agitated [ ] cachexia [ ] nonverbal [ ] coma [ ]  HEENT: normal [x ] dry mouth [ ] Bipap [  ] ET tube/trach [ ]Vent [  ] excessive secretions [ ]  LUNGS: Comfortable [ x] tachypnea/ labored breathing[ ]   CV :  normal [ ] tachycardia [x ]bradycardia [  ]   GI : normal [x ] distended [ ] tender [ ] no BS [ ]  PEG /NG tube [ ]   : normal [ ] incontinent [x ] oliguria/anuria [ ] wren [ ]  MSK : normal [ ] weakness [ ] edema [ ] ambulatory [ ] bedbound /wheelchair bound [ x]   SKIN : normal [x ] pressure ulcer: Yes [  ] No [  ] Stage ______________ rash: Yes [  ] No [  ]    Functional Assessment:   Karnofsky Performance Score :<30%  Palliative Performance Status Version 2:         %    LABS:    I&O's Detail    02 Jul 2017 07:01  -  03 Jul 2017 07:00  --------------------------------------------------------  IN:    Jevity: 150 mL    Oral Fluid: 660 mL  Total IN: 810 mL    OUT:    Voided: 500 mL  Total OUT: 500 mL    Total NET: 310 mL    Assessment:   89y Female admitted with ACUTE RESPIRATORY FAILURE UNSPECIFIED WHETHER HYPOXIA OR    PROBLEM LIST :  PROBLEM/RECOMMENDATION: 1) Problem : Goals of care, counseling/ discussion.  Recommendation: Meet with patient's family and discussed GOC & Advanced care planning                                    Palliative care information /counseling given                                        Advanced Directives addressed   PROBLEM/ RECOMMENDATION:2)Problem :Resuscitation/ DNR/ DNI,   Recommendation: DNR/ DNI    PROBLEM/ RECOMMENDATION :3)Problem : Medical order for life sustaning treatment  Recommendation : MOLST Form discussed.    PROBLEM/RECOMMENDATION :4)Problem : Advanced care planning  Recommendation : Family meeting on:     PLAN:  REFERRALS  Hospice: YES [  ] NO [ x ]                         Palliative Med : YES [  ] NO [ x ]                         Unit SW/Case Mgmt: YES [ x ] NO [  ]                         : YES [  ] NO [x  ]                         Speech/Swallow: YES [  ] NO [ x ]                         Pain Management: YES [  ] NO [ x ]                         Nutrition: YES [ x ] NO [  ]                         Ethics: YES [  ] NO [x  ]                         PT/OT: YES [x  ] NO [  ]  Symptom Management Recommendations:   Family should consider on advanced care planning ( DNR/ DNI).  Home care with palliative care.     Assessment and Plan:   · Assessment		  Pt is a 89 yo lady with a pmhx of dementia, Alzheimer's chronic COPD, acute respiratory failure HTN, prior hx of aspiration pna and PE who presents to the ED with AMS and sob. Aide called EMS for tremor like activity and wet and coarse breathing. Pt found to have coarse lung sounds and sputum and was intubated in field for airway protection. No recent complaints of cough, abd pain, dysuria or headache. Pt in ED with breath sounds equal bilaterally, O2 sat 100%. Moving all 4 extremities.    PROBLEM/RECOMMENDATION: 1) Problem : Goals of care, counseling/ discussion.  Recommendation: Meet with patient's family and discussed GOC & Advanced care planning                                    Palliative care information /counseling given                                        Advanced Directives addressed   PROBLEM/ RECOMMENDATION:2)Problem :Resuscitation/ DNR/ DNI,   Recommendation: DNR/ DNI    PROBLEM/ RECOMMENDATION :3)Problem : Medical order for life sustaning treatment  Recommendation : MOLST Form discussed.    PROBLEM/RECOMMENDATION :4)Problem : Advanced care planning  Recommendation : Family meeting on:     PLAN:  REFERRALS  Hospice: YES [  ] NO [ x ]                         Palliative Med : YES [  ] NO [ x ]                         Unit SW/Case Mgmt: YES [ x ] NO [  ]                         : YES [  ] NO [x  ]                         Speech/Swallow: YES [  ] NO [ x ]                         Pain Management: YES [  ] NO [ x ]                         Nutrition: YES [ x ] NO [  ]                         Ethics: YES [  ] NO [x  ]                         PT/OT: YES [x  ] NO [  ]  Symptom Management Recommendations:   Family should consider on advanced care planning ( DNR/ DNI).  Home care with palliative care.

## 2017-07-06 NOTE — PROGRESS NOTE ADULT - PROBLEM SELECTOR PROBLEM 3
Acute respiratory failure with hypoxia
Alzheimer's dementia with behavioral disturbance, unspecified timing of dementia onset
Urinary tract infection without hematuria, site unspecified
Pneumonia of right lower lobe due to other aerobic Gram-negative bacteria

## 2017-07-06 NOTE — PROGRESS NOTE ADULT - PROBLEM SELECTOR PLAN 2
cont BIPAP
cont abx
cont med
continue Initial broad spectrum coverage for MDR orgamsims including staph aureus and gram negatives.     Narrow specrtum based on culture  sensitivities
on BIPAP prn
cont abx

## 2017-07-06 NOTE — PROGRESS NOTE ADULT - PROBLEM SELECTOR PROBLEM 1
Acute respiratory failure with hypoxia
Acute respiratory failure with hypoxia
Acute respiratory failure, unspecified whether with hypoxia or hypercapnia
Acute respiratory failure, unspecified whether with hypoxia or hypercapnia
Pneumonia of right lower lobe due to other aerobic Gram-negative bacteria
Acute respiratory failure with hypoxia
Acute respiratory failure, unspecified whether with hypoxia or hypercapnia

## 2017-07-06 NOTE — PROGRESS NOTE ADULT - SUBJECTIVE AND OBJECTIVE BOX
Patient is a 89y old  Female who presents with a chief complaint of increasing O2 demands (29 Jun 2017 21:27)      INTERVAL HPI/OVERNIGHT EVENTS:  pt is doing better  MEDICATIONS  (STANDING):  methylPREDNISolone sodium succinate Injectable 40 milliGRAM(s) IV Push every 12 hours  heparin  Injectable 5000 Unit(s) SubCutaneous every 8 hours  ALBUTerol    0.083% 2.5 milliGRAM(s) Nebulizer every 6 hours  clonazePAM Tablet 0.25 milliGRAM(s) Oral two times a day  QUEtiapine 12.5 milliGRAM(s) Oral at bedtime  senna 2 Tablet(s) Oral daily  amLODIPine   Tablet 2.5 milliGRAM(s) Oral daily  dextrose 5%. 1000 milliLiter(s) (50 mL/Hr) IV Continuous <Continuous>    MEDICATIONS  (PRN):  acetaminophen   Tablet 650 milliGRAM(s) Oral every 6 hours PRN For Temp greater than 38 C (100.4 F)      Allergies    Haldol (Unknown)    Intolerances        REVIEW OF SYSTEMS:  CONSTITUTIONAL: No fever, weight loss, or fatigue  EYES: No eye pain, visual disturbances, or discharge  ENMT:  No difficulty hearing, tinnitus, vertigo; No sinus or throat pain  NECK: No pain or stiffness  BREASTS: No pain, masses, or nipple discharge  RESPIRATORY: No cough, wheezing, chills or hemoptysis; No shortness of breath  CARDIOVASCULAR: No chest pain, palpitations, dizziness, or leg swelling  GASTROINTESTINAL: No abdominal or epigastric pain. No nausea, vomiting, or hematemesis; No diarrhea or constipation. No melena or hematochezia.  GENITOURINARY: No dysuria, frequency, hematuria, or incontinence  NEUROLOGICAL: No headaches, memory loss, loss of strength, numbness, or tremors  SKIN: No itching, burning, rashes, or lesions   LYMPH NODES: No enlarged glands  ENDOCRINE: No heat or cold intolerance; No hair loss  MUSCULOSKELETAL: No joint pain or swelling; No muscle, back, or extremity pain  PSYCHIATRIC: No depression, anxiety, mood swings, or difficulty sleeping  HEME/LYMPH: No easy bruising, or bleeding gums  ALLERGY AND IMMUNOLOGIC: No hives or eczema    Vital Signs Last 24 Hrs  T(C): 36.7 (06 Jul 2017 06:42), Max: 37.1 (05 Jul 2017 16:42)  T(F): 98 (06 Jul 2017 06:42), Max: 98.8 (05 Jul 2017 16:42)  HR: 65 (06 Jul 2017 06:42) (64 - 108)  BP: 136/76 (06 Jul 2017 06:42) (118/63 - 191/123)  BP(mean): --  RR: 17 (06 Jul 2017 06:42) (17 - 18)  SpO2: 98% (06 Jul 2017 06:42) (94% - 100%)    PHYSICAL EXAM:  GENERAL: NAD, well-groomed, well-developed  HEAD:  Atraumatic, Normocephalic  EYES: EOMI, PERRLA, conjunctiva and sclera clear  ENMT: No tonsillar erythema, exudates, or enlargement; Moist mucous membranes, Good dentition, No lesions  NECK: Supple, No JVD, Normal thyroid  NERVOUS SYSTEM:  Alert & Oriented X3, Good concentration; Motor Strength 5/5 B/L upper and lower extremities; DTRs 2+ intact and symmetric  CHEST/LUNG: Clear to percussion bilaterally; No rales, rhonchi, wheezing, or rubs  HEART: Regular rate and rhythm; No murmurs, rubs, or gallops  ABDOMEN: Soft, Nontender, Nondistended; Bowel sounds present  EXTREMITIES:  2+ Peripheral Pulses, No clubbing, cyanosis, or edema  LYMPH: No lymphadenopathy noted  SKIN: No rashes or lesions    LABS:                        11.3   8.1   )-----------( 232      ( 05 Jul 2017 07:46 )             35.2     07-05    146<H>  |  102  |  28<H>  ----------------------------<  103<H>  4.1   |  37<H>  |  0.72    Ca    8.6      05 Jul 2017 07:46          CAPILLARY BLOOD GLUCOSE        CULTURES:    HEMOGLOBIN A1C:    CHOLESTEROL:        RADIOLOGY & ADDITIONAL TESTS:

## 2017-07-06 NOTE — PROGRESS NOTE ADULT - ATTENDING COMMENTS
Chart reviewed, patient improving, CV stable, can d/c telemetry.  Increase activity as tolerated.
D/C planning

## 2017-07-07 VITALS
DIASTOLIC BLOOD PRESSURE: 64 MMHG | HEART RATE: 66 BPM | SYSTOLIC BLOOD PRESSURE: 149 MMHG | RESPIRATION RATE: 17 BRPM | TEMPERATURE: 98 F | OXYGEN SATURATION: 96 %

## 2017-07-07 LAB
ANION GAP SERPL CALC-SCNC: 6 MMOL/L — SIGNIFICANT CHANGE UP (ref 5–17)
BUN SERPL-MCNC: 21 MG/DL — SIGNIFICANT CHANGE UP (ref 7–23)
CALCIUM SERPL-MCNC: 8.2 MG/DL — LOW (ref 8.5–10.1)
CHLORIDE SERPL-SCNC: 101 MMOL/L — SIGNIFICANT CHANGE UP (ref 96–108)
CO2 SERPL-SCNC: 36 MMOL/L — HIGH (ref 22–31)
CREAT SERPL-MCNC: 0.72 MG/DL — SIGNIFICANT CHANGE UP (ref 0.5–1.3)
GLUCOSE SERPL-MCNC: 114 MG/DL — HIGH (ref 70–99)
HCT VFR BLD CALC: 34.1 % — LOW (ref 34.5–45)
HGB BLD-MCNC: 11 G/DL — LOW (ref 11.5–15.5)
MCHC RBC-ENTMCNC: 27.6 PG — SIGNIFICANT CHANGE UP (ref 27–34)
MCHC RBC-ENTMCNC: 32.2 GM/DL — SIGNIFICANT CHANGE UP (ref 32–36)
MCV RBC AUTO: 85.7 FL — SIGNIFICANT CHANGE UP (ref 80–100)
PLATELET # BLD AUTO: 271 K/UL — SIGNIFICANT CHANGE UP (ref 150–400)
POTASSIUM SERPL-MCNC: 3.7 MMOL/L — SIGNIFICANT CHANGE UP (ref 3.5–5.3)
POTASSIUM SERPL-SCNC: 3.7 MMOL/L — SIGNIFICANT CHANGE UP (ref 3.5–5.3)
RBC # BLD: 3.98 M/UL — SIGNIFICANT CHANGE UP (ref 3.8–5.2)
RBC # FLD: 17.4 % — HIGH (ref 11–15)
SODIUM SERPL-SCNC: 143 MMOL/L — SIGNIFICANT CHANGE UP (ref 135–145)
WBC # BLD: 10.2 K/UL — SIGNIFICANT CHANGE UP (ref 3.8–10.5)
WBC # FLD AUTO: 10.2 K/UL — SIGNIFICANT CHANGE UP (ref 3.8–10.5)

## 2017-07-07 RX ORDER — SENNA PLUS 8.6 MG/1
2 TABLET ORAL
Qty: 0 | Refills: 0 | COMMUNITY
Start: 2017-07-07

## 2017-07-07 RX ADMIN — HEPARIN SODIUM 5000 UNIT(S): 5000 INJECTION INTRAVENOUS; SUBCUTANEOUS at 05:32

## 2017-07-07 RX ADMIN — SENNA PLUS 2 TABLET(S): 8.6 TABLET ORAL at 21:40

## 2017-07-07 RX ADMIN — Medication 40 MILLIGRAM(S): at 05:32

## 2017-07-07 RX ADMIN — HEPARIN SODIUM 5000 UNIT(S): 5000 INJECTION INTRAVENOUS; SUBCUTANEOUS at 13:21

## 2017-07-07 RX ADMIN — QUETIAPINE FUMARATE 12.5 MILLIGRAM(S): 200 TABLET, FILM COATED ORAL at 21:40

## 2017-07-07 RX ADMIN — Medication 10 MILLIGRAM(S): at 17:35

## 2017-07-07 RX ADMIN — ALBUTEROL 2.5 MILLIGRAM(S): 90 AEROSOL, METERED ORAL at 11:04

## 2017-07-07 RX ADMIN — ALBUTEROL 2.5 MILLIGRAM(S): 90 AEROSOL, METERED ORAL at 17:12

## 2017-07-07 RX ADMIN — HEPARIN SODIUM 5000 UNIT(S): 5000 INJECTION INTRAVENOUS; SUBCUTANEOUS at 21:41

## 2017-07-07 RX ADMIN — SODIUM CHLORIDE 50 MILLILITER(S): 9 INJECTION, SOLUTION INTRAVENOUS at 05:33

## 2017-07-07 RX ADMIN — AMLODIPINE BESYLATE 2.5 MILLIGRAM(S): 2.5 TABLET ORAL at 05:32

## 2017-07-07 RX ADMIN — ALBUTEROL 2.5 MILLIGRAM(S): 90 AEROSOL, METERED ORAL at 06:00

## 2017-07-07 RX ADMIN — ALBUTEROL 2.5 MILLIGRAM(S): 90 AEROSOL, METERED ORAL at 00:07

## 2017-07-07 RX ADMIN — Medication 0.25 MILLIGRAM(S): at 05:31

## 2017-07-07 NOTE — DISCHARGE NOTE ADULT - PATIENT PORTAL LINK FT
“You can access the [employer] Patient Portal, offered by [facility], by registering with the following website: [email or web address]”

## 2017-07-07 NOTE — PROGRESS NOTE ADULT - SUBJECTIVE AND OBJECTIVE BOX
HPI:  Pt is a 89 yo lady with a pmhx of dementia, Alzheimer's chronic COPD, acute respiratory failure HTN, prior hx of aspiration pna and PE who presents to the ED with AMS and sob. Aide called EMS for tremor like activity and wet and coarse breathing. Pt found to have coarse lung sounds and sputum and was intubated in field for airway protection. No recent complaints of cough, abd pain, dysuria or headache. Pt in ED with breath sounds equal bilaterally, O2 sat 100%. Moving all 4 extremities.      MEDICATIONS  (STANDING):  methylPREDNISolone sodium succinate Injectable 40 milliGRAM(s) IV Push every 12 hours  heparin  Injectable 5000 Unit(s) SubCutaneous every 8 hours  ALBUTerol    0.083% 2.5 milliGRAM(s) Nebulizer every 6 hours  clonazePAM Tablet 0.25 milliGRAM(s) Oral two times a day  QUEtiapine 12.5 milliGRAM(s) Oral at bedtime  senna 2 Tablet(s) Oral daily  amLODIPine   Tablet 2.5 milliGRAM(s) Oral daily  dextrose 5%. 1000 milliLiter(s) (50 mL/Hr) IV Continuous <Continuous>    MEDICATIONS  (PRN):  acetaminophen   Tablet 650 milliGRAM(s) Oral every 6 hours PRN For Temp greater than 38 C (100.4 F)  Vital Signs Last 24 Hrs  T(C): 36.4 (07 Jul 2017 05:35), Max: 37.1 (06 Jul 2017 11:13)  T(F): 97.6 (07 Jul 2017 05:35), Max: 98.8 (06 Jul 2017 11:13)  HR: 78 (07 Jul 2017 06:19) (70 - 91)  BP: 154/77 (07 Jul 2017 05:35) (133/95 - 154/77)  BP(mean): --  RR: 18 (07 Jul 2017 05:35) (17 - 18)  SpO2: 96% (07 Jul 2017 06:19) (91% - 99%)

## 2017-07-07 NOTE — PROGRESS NOTE ADULT - PROVIDER SPECIALTY LIST ADULT
Cardiology
Critical Care
Critical Care
Internal Medicine
MICU
Palliative Care
Critical Care

## 2017-07-07 NOTE — DISCHARGE NOTE ADULT - SECONDARY DIAGNOSIS.
Alzheimer's dementia with behavioral disturbance, unspecified timing of dementia onset Pneumonia of right lower lobe due to other aerobic Gram-negative bacteria Urinary tract infection without hematuria, site unspecified Chronic obstructive pulmonary disease, unspecified COPD type

## 2017-07-07 NOTE — DISCHARGE NOTE ADULT - HOSPITAL COURSE
Pt is a 89 yo lady with a pmhx of dementia, alzheimers, chronic COPD, acute respiratory failure HTN, prior hx of aspiration pna and PE who presents to the ED with AMS and sob

## 2017-07-07 NOTE — DISCHARGE NOTE ADULT - MEDICATION SUMMARY - MEDICATIONS TO TAKE
I will START or STAY ON the medications listed below when I get home from the hospital:    clonazePAM 0.25 mg oral tablet  -- 0.5 milligram(s) by mouth , As Needed  -- Indication: For Alzheimer's Dementia with Behavioral Disturbance    SEROquel 25 mg oral tablet  -- 1 tab(s) by mouth 2 times a day  -- Indication: For Alzheimer's Dementia with Behavioral Disturbance    amLODIPine 2.5 mg oral tablet  -- 1 tab(s) by mouth once a day  -- Indication: For Benign Essential Hypertension    senna oral tablet  -- 2 tab(s) by mouth once a day  -- Indication: For Constipation, unspecified constipation type    Nuedexta  --  by mouth   -- Indication: For Alzheimer's Dementia with Behavioral Disturbance    pantoprazole 40 mg oral delayed release tablet  -- 1 tab(s) by mouth once a day (before a meal)  -- Indication: For gerd I will START or STAY ON the medications listed below when I get home from the hospital:    predniSONE 10 mg oral tablet  -- 1 tab(s) by mouth 2 times a day MDD:for 3days then 1 daily for 2 days then stop  -- Indication: For Copd    clonazePAM 0.25 mg oral tablet  -- 0.5 milligram(s) by mouth , As Needed  -- Indication: For Alzheimer's Dementia with Behavioral Disturbance    SEROquel 25 mg oral tablet  -- 1 tab(s) by mouth 2 times a day  -- Indication: For Alzheimer's Dementia with Behavioral Disturbance    amLODIPine 2.5 mg oral tablet  -- 1 tab(s) by mouth once a day  -- Indication: For Benign Essential Hypertension    senna oral tablet  -- 2 tab(s) by mouth once a day  -- Indication: For Constipation, unspecified constipation type    Nuedexta  --  by mouth   -- Indication: For Alzheimer's Dementia with Behavioral Disturbance    pantoprazole 40 mg oral delayed release tablet  -- 1 tab(s) by mouth once a day (before a meal)  -- Indication: For gerd

## 2017-07-07 NOTE — DISCHARGE NOTE ADULT - OTHER SIGNIFICANT FINDINGS
pt requires home oxygen pt admitted for acute on chronic respiratory failure. pt is now  chronic and stable condition requiring contineous home oxygen therapy.

## 2017-07-07 NOTE — DISCHARGE NOTE ADULT - CARE PLAN
Principal Discharge DX:	Acute respiratory failure with hypoxia  Goal:	home oxygenation  Instructions for follow-up, activity and diet:	follow up with pmd in 1 week  Secondary Diagnosis:	Alzheimer's dementia with behavioral disturbance, unspecified timing of dementia onset  Secondary Diagnosis:	Pneumonia of right lower lobe due to other aerobic Gram-negative bacteria  Secondary Diagnosis:	Urinary tract infection without hematuria, site unspecified Principal Discharge DX:	Acute respiratory failure with hypoxia  Goal:	home oxygenation  Instructions for follow-up, activity and diet:	follow up with pmd in 1 week  Secondary Diagnosis:	Alzheimer's dementia with behavioral disturbance, unspecified timing of dementia onset  Secondary Diagnosis:	Pneumonia of right lower lobe due to other aerobic Gram-negative bacteria  Goal:	resolved  Secondary Diagnosis:	Urinary tract infection without hematuria, site unspecified  Secondary Diagnosis:	Chronic obstructive pulmonary disease, unspecified COPD type  Goal:	stable

## 2017-07-11 DIAGNOSIS — N39.0 URINARY TRACT INFECTION, SITE NOT SPECIFIED: ICD-10-CM

## 2017-07-11 DIAGNOSIS — J96.00 ACUTE RESPIRATORY FAILURE, UNSPECIFIED WHETHER WITH HYPOXIA OR HYPERCAPNIA: ICD-10-CM

## 2017-07-11 DIAGNOSIS — Z85.43 PERSONAL HISTORY OF MALIGNANT NEOPLASM OF OVARY: ICD-10-CM

## 2017-07-11 DIAGNOSIS — K59.00 CONSTIPATION, UNSPECIFIED: ICD-10-CM

## 2017-07-11 DIAGNOSIS — Z86.711 PERSONAL HISTORY OF PULMONARY EMBOLISM: ICD-10-CM

## 2017-07-11 DIAGNOSIS — Z87.440 PERSONAL HISTORY OF URINARY (TRACT) INFECTIONS: ICD-10-CM

## 2017-07-11 DIAGNOSIS — Z74.01 BED CONFINEMENT STATUS: ICD-10-CM

## 2017-07-11 DIAGNOSIS — Z95.2 PRESENCE OF PROSTHETIC HEART VALVE: ICD-10-CM

## 2017-07-11 DIAGNOSIS — G47.33 OBSTRUCTIVE SLEEP APNEA (ADULT) (PEDIATRIC): ICD-10-CM

## 2017-07-11 DIAGNOSIS — Z88.8 ALLERGY STATUS TO OTHER DRUGS, MEDICAMENTS AND BIOLOGICAL SUBSTANCES STATUS: ICD-10-CM

## 2017-07-11 DIAGNOSIS — Z16.24 RESISTANCE TO MULTIPLE ANTIBIOTICS: ICD-10-CM

## 2017-07-11 DIAGNOSIS — I10 ESSENTIAL (PRIMARY) HYPERTENSION: ICD-10-CM

## 2017-07-11 DIAGNOSIS — J84.10 PULMONARY FIBROSIS, UNSPECIFIED: ICD-10-CM

## 2017-07-11 DIAGNOSIS — G30.9 ALZHEIMER'S DISEASE, UNSPECIFIED: ICD-10-CM

## 2017-07-11 DIAGNOSIS — J44.0 CHRONIC OBSTRUCTIVE PULMONARY DISEASE WITH ACUTE LOWER RESPIRATORY INFECTION: ICD-10-CM

## 2017-07-11 DIAGNOSIS — Z99.81 DEPENDENCE ON SUPPLEMENTAL OXYGEN: ICD-10-CM

## 2017-07-11 DIAGNOSIS — F02.81 DEMENTIA IN OTHER DISEASES CLASSIFIED ELSEWHERE, UNSPECIFIED SEVERITY, WITH BEHAVIORAL DISTURBANCE: ICD-10-CM

## 2017-07-11 DIAGNOSIS — I44.1 ATRIOVENTRICULAR BLOCK, SECOND DEGREE: ICD-10-CM

## 2017-07-11 DIAGNOSIS — R00.1 BRADYCARDIA, UNSPECIFIED: ICD-10-CM

## 2017-07-11 DIAGNOSIS — J96.21 ACUTE AND CHRONIC RESPIRATORY FAILURE WITH HYPOXIA: ICD-10-CM

## 2017-07-11 DIAGNOSIS — J15.6 PNEUMONIA DUE TO OTHER GRAM-NEGATIVE BACTERIA: ICD-10-CM

## 2017-07-14 NOTE — H&P ADULT - PROBLEM/PLAN-1
81 yo woman w PE, DVT IVC filter, recent severe GI bleed, off AC, now w PAT, stable on BB. DC home DISPLAY PLAN FREE TEXT

## 2018-01-17 ENCOUNTER — INPATIENT (INPATIENT)
Facility: HOSPITAL | Age: 83
LOS: 12 days | Discharge: HOME HEALTH | End: 2018-01-30
Attending: INTERNAL MEDICINE | Admitting: INTERNAL MEDICINE
Payer: MEDICARE

## 2018-01-17 VITALS
SYSTOLIC BLOOD PRESSURE: 107 MMHG | RESPIRATION RATE: 16 BRPM | DIASTOLIC BLOOD PRESSURE: 75 MMHG | OXYGEN SATURATION: 90 % | HEART RATE: 90 BPM | TEMPERATURE: 98 F | WEIGHT: 130.07 LBS | HEIGHT: 63 IN

## 2018-01-17 DIAGNOSIS — J96.92 RESPIRATORY FAILURE, UNSPECIFIED WITH HYPERCAPNIA: ICD-10-CM

## 2018-01-17 DIAGNOSIS — Z95.2 PRESENCE OF PROSTHETIC HEART VALVE: Chronic | ICD-10-CM

## 2018-01-17 LAB
ALBUMIN SERPL ELPH-MCNC: 2.7 G/DL — LOW (ref 3.3–5)
ALP SERPL-CCNC: 101 U/L — SIGNIFICANT CHANGE UP (ref 40–120)
ALT FLD-CCNC: 12 U/L — SIGNIFICANT CHANGE UP (ref 12–78)
ANION GAP SERPL CALC-SCNC: 3 MMOL/L — LOW (ref 5–17)
APTT BLD: 27.9 SEC — SIGNIFICANT CHANGE UP (ref 27.5–37.4)
AST SERPL-CCNC: 14 U/L — LOW (ref 15–37)
BASE EXCESS BLDA CALC-SCNC: 10.3 MMOL/L — HIGH (ref -2–2)
BASE EXCESS BLDA CALC-SCNC: 12.8 MMOL/L — HIGH (ref -2–2)
BASE EXCESS BLDA CALC-SCNC: 2.5 MMOL/L — HIGH (ref -2–2)
BASOPHILS # BLD AUTO: 0.1 K/UL — SIGNIFICANT CHANGE UP (ref 0–0.2)
BASOPHILS NFR BLD AUTO: 1.3 % — SIGNIFICANT CHANGE UP (ref 0–2)
BILIRUB SERPL-MCNC: 0.2 MG/DL — SIGNIFICANT CHANGE UP (ref 0.2–1.2)
BLOOD GAS COMMENTS: SIGNIFICANT CHANGE UP
BLOOD GAS SOURCE: SIGNIFICANT CHANGE UP
BUN SERPL-MCNC: 9 MG/DL — SIGNIFICANT CHANGE UP (ref 7–23)
CALCIUM SERPL-MCNC: 8.4 MG/DL — LOW (ref 8.5–10.1)
CHLORIDE SERPL-SCNC: 97 MMOL/L — SIGNIFICANT CHANGE UP (ref 96–108)
CK MB BLD-MCNC: 3.4 % — SIGNIFICANT CHANGE UP (ref 0–3.5)
CK MB CFR SERPL CALC: 1.5 NG/ML — SIGNIFICANT CHANGE UP (ref 0.5–3.6)
CK SERPL-CCNC: 44 U/L — SIGNIFICANT CHANGE UP (ref 26–192)
CO2 SERPL-SCNC: 42 MMOL/L — HIGH (ref 22–31)
CREAT SERPL-MCNC: 0.6 MG/DL — SIGNIFICANT CHANGE UP (ref 0.5–1.3)
EOSINOPHIL # BLD AUTO: 0.2 K/UL — SIGNIFICANT CHANGE UP (ref 0–0.5)
EOSINOPHIL NFR BLD AUTO: 2.2 % — SIGNIFICANT CHANGE UP (ref 0–6)
FLUAV SPEC QL CULT: NEGATIVE — SIGNIFICANT CHANGE UP
FLUBV AG SPEC QL IA: NEGATIVE — SIGNIFICANT CHANGE UP
GLUCOSE SERPL-MCNC: 110 MG/DL — HIGH (ref 70–99)
HCO3 BLDA-SCNC: 26 MMOL/L — SIGNIFICANT CHANGE UP (ref 21–29)
HCO3 BLDA-SCNC: 39 MMOL/L — HIGH (ref 21–29)
HCO3 BLDA-SCNC: 42 MMOL/L — HIGH (ref 21–29)
HCT VFR BLD CALC: 34.1 % — LOW (ref 34.5–45)
HGB BLD-MCNC: 10.4 G/DL — LOW (ref 11.5–15.5)
HOROWITZ INDEX BLDA+IHG-RTO: 100 — SIGNIFICANT CHANGE UP
HOROWITZ INDEX BLDA+IHG-RTO: 30 — SIGNIFICANT CHANGE UP
HOROWITZ INDEX BLDA+IHG-RTO: 40 — SIGNIFICANT CHANGE UP
INR BLD: 1 RATIO — SIGNIFICANT CHANGE UP (ref 0.88–1.16)
LACTATE SERPL-SCNC: 1.8 MMOL/L — SIGNIFICANT CHANGE UP (ref 0.7–2)
LYMPHOCYTES # BLD AUTO: 1.7 K/UL — SIGNIFICANT CHANGE UP (ref 1–3.3)
LYMPHOCYTES # BLD AUTO: 21.1 % — SIGNIFICANT CHANGE UP (ref 13–44)
MCHC RBC-ENTMCNC: 29.4 PG — SIGNIFICANT CHANGE UP (ref 27–34)
MCHC RBC-ENTMCNC: 30.3 GM/DL — LOW (ref 32–36)
MCV RBC AUTO: 96.8 FL — SIGNIFICANT CHANGE UP (ref 80–100)
MONOCYTES # BLD AUTO: 0.7 K/UL — SIGNIFICANT CHANGE UP (ref 0–0.9)
MONOCYTES NFR BLD AUTO: 8.4 % — SIGNIFICANT CHANGE UP (ref 2–14)
NEUTROPHILS # BLD AUTO: 5.5 K/UL — SIGNIFICANT CHANGE UP (ref 1.8–7.4)
NEUTROPHILS NFR BLD AUTO: 67 % — SIGNIFICANT CHANGE UP (ref 43–77)
NT-PROBNP SERPL-SCNC: 479 PG/ML — HIGH (ref 0–450)
PCO2 BLDA: 40 MMHG — SIGNIFICANT CHANGE UP (ref 32–46)
PCO2 BLDA: 89 MMHG — CRITICAL HIGH (ref 32–46)
PCO2 BLDA: 90 MMHG — CRITICAL HIGH (ref 32–46)
PH BLD: 7.27 — LOW (ref 7.35–7.45)
PH BLD: 7.29 — LOW (ref 7.35–7.45)
PH BLD: 7.43 — SIGNIFICANT CHANGE UP (ref 7.35–7.45)
PLATELET # BLD AUTO: 232 K/UL — SIGNIFICANT CHANGE UP (ref 150–400)
PO2 BLDA: 119 MMHG — HIGH (ref 74–108)
PO2 BLDA: 274 MMHG — HIGH (ref 74–108)
PO2 BLDA: 65 MMHG — LOW (ref 74–108)
POTASSIUM SERPL-MCNC: 4.3 MMOL/L — SIGNIFICANT CHANGE UP (ref 3.5–5.3)
POTASSIUM SERPL-SCNC: 4.3 MMOL/L — SIGNIFICANT CHANGE UP (ref 3.5–5.3)
PROT SERPL-MCNC: 7.7 GM/DL — SIGNIFICANT CHANGE UP (ref 6–8.3)
PROTHROM AB SERPL-ACNC: 10.9 SEC — SIGNIFICANT CHANGE UP (ref 9.8–12.7)
RBC # BLD: 3.52 M/UL — LOW (ref 3.8–5.2)
RBC # FLD: 17.2 % — HIGH (ref 11–15)
SAO2 % BLDA: 100 % — HIGH (ref 92–96)
SAO2 % BLDA: 90 % — LOW (ref 92–96)
SAO2 % BLDA: 98 % — HIGH (ref 92–96)
SODIUM SERPL-SCNC: 142 MMOL/L — SIGNIFICANT CHANGE UP (ref 135–145)
TROPONIN I SERPL-MCNC: <.015 NG/ML — SIGNIFICANT CHANGE UP (ref 0.01–0.04)
WBC # BLD: 8.2 K/UL — SIGNIFICANT CHANGE UP (ref 3.8–10.5)
WBC # FLD AUTO: 8.2 K/UL — SIGNIFICANT CHANGE UP (ref 3.8–10.5)

## 2018-01-17 PROCEDURE — 99291 CRITICAL CARE FIRST HOUR: CPT

## 2018-01-17 PROCEDURE — 93010 ELECTROCARDIOGRAM REPORT: CPT

## 2018-01-17 PROCEDURE — 71045 X-RAY EXAM CHEST 1 VIEW: CPT | Mod: 26

## 2018-01-17 RX ORDER — AZITHROMYCIN 500 MG/1
500 TABLET, FILM COATED ORAL ONCE
Qty: 0 | Refills: 0 | Status: COMPLETED | OUTPATIENT
Start: 2018-01-17 | End: 2018-01-17

## 2018-01-17 RX ORDER — IPRATROPIUM/ALBUTEROL SULFATE 18-103MCG
3 AEROSOL WITH ADAPTER (GRAM) INHALATION ONCE
Qty: 0 | Refills: 0 | Status: COMPLETED | OUTPATIENT
Start: 2018-01-17 | End: 2018-01-17

## 2018-01-17 RX ORDER — TIOTROPIUM BROMIDE 18 UG/1
1 CAPSULE ORAL; RESPIRATORY (INHALATION) DAILY
Qty: 0 | Refills: 0 | Status: DISCONTINUED | OUTPATIENT
Start: 2018-01-17 | End: 2018-01-30

## 2018-01-17 RX ORDER — IPRATROPIUM/ALBUTEROL SULFATE 18-103MCG
3 AEROSOL WITH ADAPTER (GRAM) INHALATION EVERY 6 HOURS
Qty: 0 | Refills: 0 | Status: DISCONTINUED | OUTPATIENT
Start: 2018-01-17 | End: 2018-01-21

## 2018-01-17 RX ORDER — AZITHROMYCIN 500 MG/1
TABLET, FILM COATED ORAL
Qty: 0 | Refills: 0 | Status: DISCONTINUED | OUTPATIENT
Start: 2018-01-17 | End: 2018-01-23

## 2018-01-17 RX ORDER — SODIUM CHLORIDE 9 MG/ML
3 INJECTION INTRAMUSCULAR; INTRAVENOUS; SUBCUTANEOUS ONCE
Qty: 0 | Refills: 0 | Status: COMPLETED | OUTPATIENT
Start: 2018-01-17 | End: 2018-01-17

## 2018-01-17 RX ORDER — ALBUTEROL 90 UG/1
1 AEROSOL, METERED ORAL EVERY 4 HOURS
Qty: 0 | Refills: 0 | Status: DISCONTINUED | OUTPATIENT
Start: 2018-01-17 | End: 2018-01-30

## 2018-01-17 RX ORDER — CEFTRIAXONE 500 MG/1
1 INJECTION, POWDER, FOR SOLUTION INTRAMUSCULAR; INTRAVENOUS ONCE
Qty: 0 | Refills: 0 | Status: COMPLETED | OUTPATIENT
Start: 2018-01-17 | End: 2018-01-17

## 2018-01-17 RX ORDER — CEFTRIAXONE 500 MG/1
1 INJECTION, POWDER, FOR SOLUTION INTRAMUSCULAR; INTRAVENOUS EVERY 24 HOURS
Qty: 0 | Refills: 0 | Status: DISCONTINUED | OUTPATIENT
Start: 2018-01-18 | End: 2018-01-19

## 2018-01-17 RX ORDER — CEFTRIAXONE 500 MG/1
INJECTION, POWDER, FOR SOLUTION INTRAMUSCULAR; INTRAVENOUS
Qty: 0 | Refills: 0 | Status: DISCONTINUED | OUTPATIENT
Start: 2018-01-17 | End: 2018-01-19

## 2018-01-17 RX ORDER — AZITHROMYCIN 500 MG/1
500 TABLET, FILM COATED ORAL EVERY 24 HOURS
Qty: 0 | Refills: 0 | Status: DISCONTINUED | OUTPATIENT
Start: 2018-01-18 | End: 2018-01-23

## 2018-01-17 RX ADMIN — Medication 40 MILLIGRAM(S): at 22:29

## 2018-01-17 RX ADMIN — SODIUM CHLORIDE 3 MILLILITER(S): 9 INJECTION INTRAMUSCULAR; INTRAVENOUS; SUBCUTANEOUS at 15:01

## 2018-01-17 RX ADMIN — CEFTRIAXONE 100 GRAM(S): 500 INJECTION, POWDER, FOR SOLUTION INTRAMUSCULAR; INTRAVENOUS at 20:12

## 2018-01-17 RX ADMIN — AZITHROMYCIN 255 MILLIGRAM(S): 500 TABLET, FILM COATED ORAL at 18:38

## 2018-01-17 RX ADMIN — Medication 3 MILLILITER(S): at 14:50

## 2018-01-17 NOTE — H&P ADULT - NSHPLABSRESULTS_GEN_ALL_CORE
Lab Results:  CBC  CBC Full  -  ( 17 Jan 2018 14:55 )  WBC Count : 8.2 K/uL  Hemoglobin : 10.4 g/dL  Hematocrit : 34.1 %  Platelet Count - Automated : 232 K/uL  Mean Cell Volume : 96.8 fl  Mean Cell Hemoglobin : 29.4 pg  Mean Cell Hemoglobin Concentration : 30.3 gm/dL  Auto Neutrophil # : 5.5 K/uL  Auto Lymphocyte # : 1.7 K/uL  Auto Monocyte # : 0.7 K/uL  Auto Eosinophil # : 0.2 K/uL  Auto Basophil # : 0.1 K/uL  Auto Neutrophil % : 67.0 %  Auto Lymphocyte % : 21.1 %  Auto Monocyte % : 8.4 %  Auto Eosinophil % : 2.2 %  Auto Basophil % : 1.3 %    .		Differential:	[] Automated		[] Manual  Chemistry                        10.4   8.2   )-----------( 232      ( 17 Jan 2018 14:55 )             34.1     01-17    142  |  97  |  9   ----------------------------<  110<H>  4.3   |  42<H>  |  0.60    Ca    8.4<L>      17 Jan 2018 14:55    TPro  7.7  /  Alb  2.7<L>  /  TBili  0.2  /  DBili  x   /  AST  14<L>  /  ALT  12  /  AlkPhos  101  01-17    LIVER FUNCTIONS - ( 17 Jan 2018 14:55 )  Alb: 2.7 g/dL / Pro: 7.7 gm/dL / ALK PHOS: 101 U/L / ALT: 12 U/L / AST: 14 U/L / GGT: x           PT/INR - ( 17 Jan 2018 14:55 )   PT: 10.9 sec;   INR: 1.00 ratio         PTT - ( 17 Jan 2018 14:55 )  PTT:27.9 sec      ABG - ( 17 Jan 2018 15:52 )  pH: x     /  pCO2: 90    /  pO2: 65    / HCO3: 42    / Base Excess: 12.8  /  SaO2: 90                  MICROBIOLOGY/CULTURES:      RADIOLOGY RESULTS:  < from: Xray Chest 1 View AP/PA. (01.17.18 @ 15:15) >    IMPRESSION:  Pulmonary vascular congestion. Patchy right basilar airspace opacities   may be due to pneumonia. Small bilateral pleural effusions, larger on the   right    < end of copied text >

## 2018-01-17 NOTE — ED ADULT NURSE REASSESSMENT NOTE - NS ED NURSE REASSESS COMMENT FT1
called to give report, Vasquez  said that they are getting an ICU from er and to call back
Recived patient placed on cardiac monitor and bipap, droplet precautions in place. As per RN isabella, patient with Hx COPD with SOB at home, placed on bipap in ED, patient is non verbal.

## 2018-01-17 NOTE — ED PROVIDER NOTE - OBJECTIVE STATEMENT
89yo female with pmh COPD, dementia, htn, h/o asp pna and pe, presents for eval as per EMS. Pt with h/o pleural effusion. Pt at baseline mental status per ems, but noted to be tachypneic. 89yo female with pmh COPD, dementia, htn, h/o asp pna and pe, presents for eval. Per daughter, home doctor came by, disimpacted her and noted b/l crackes so sent in for ER. Per family, states pt has been more lethargic and has dry cough. no vomiting, no fever. at baseline, pt can only say name and is bed bound. per daughter, full code for now. (phone: [phone])

## 2018-01-17 NOTE — H&P ADULT - ATTENDING COMMENTS
Pt is a 89 yo WF with h/o COPD, HTN, Alzheimer's Dementia and prior aspiration PNA and PE.  Pt was seen at home for disimpaction by her house physician and sent her to ER 2 to b/l crackles. According to family pt has been more lethargic and has a dry cough. In the ER on ABG pt was found to have acute on chronic hypercapnia and CXR revealed a R infiltrate. Pt's BiPAP mask was too big with air leak and with a smaller mask pt's hypercapnia has improved. Admitting dx: Acute on chronic hypercapnia 2 to R PNA    Resp: Cont BiPAP and follow ABG prn/ Steroids + Nebs  ID: PanCx/ Ceftriaxone + Zithromax  Heme/GI: DVT/GI prophylaxis  FEN: NPO while on BiPAP  Endo: Follow Glu while on Steroids  Social: Obtain more hx from family and discuss code status    CCT: 40 min Pt is a 89 yo WF with h/o COPD, HTN, Alzheimer's Dementia and prior aspiration PNA and PE.  Pt was seen at home for disimpaction by her house physician and sent her to ER 2 to b/l crackles. According to family pt has been more lethargic and has a dry cough. In the ER on ABG pt was found to have acute on chronic hypercapnia and CXR revealed a R infiltrate. Pt's BiPAP mask was too big with air leak and changed to a smaller mask. Admitting dx: Acute on chronic hypercapnia 2 to R PNA    Resp: Cont BiPAP and follow ABG prn/ Steroids + Nebs  ID: PanCx/ Ceftriaxone + Zithromax  Heme/GI: DVT/GI prophylaxis  FEN: NPO while on BiPAP  Endo: Follow Glu while on Steroids  Social: Obtain more hx from family and discuss code status    CCT: 40 min

## 2018-01-17 NOTE — H&P ADULT - PROBLEM SELECTOR PLAN 1
pt started on bipap in ER with 20/6/40%.  There was leak in bipap mask. Will repeat ABG as the second ABG may be inaccurate due to the leak.  Smaller mask was fitted on to pt. pt started on bipap in ER with 20/6/40%.  There was leak in bipap mask. Will repeat ABG as the second ABG may be inaccurate due to the leak.  Smaller mask was fitted on to pt.  Will check Rapid viral panel and pancultures.  If pCO2 still on the rise, may need intubation. pt started on bipap in ER with 20/6/40%.  There was leak in bipap mask. Will repeat ABG as the second ABG may be inaccurate due to the leak.  Smaller mask was fitted on to pt.  Will check Rapid viral panel and pancultures.  If pCO2 still on the rise, may need intubation.  CXR concerning for pneumonia. Will start empiric iv abx.  check sputum culture.

## 2018-01-17 NOTE — ED PROVIDER NOTE - CRITICAL CARE PROVIDED
additional history taking/consultation with other physicians/conducted a detailed discussion of DNR status/consult w/ pt's family directly relating to pts condition/direct patient care (not related to procedure)/documentation/interpretation of diagnostic studies

## 2018-01-17 NOTE — H&P ADULT - HISTORY OF PRESENT ILLNESS
89 yo female pt with PMH: COPD, HTN, aspiration pna, and PE admitted and found to have hypercapneic on ABG.  As per ER chart, pt was seen at home for disimpaction by her house physician and sent her to ER for abnormal lung exam-b/l crackles.  In ER, pt was lethargic with dry cough.  ABG done and revealed hypercapnia.  Pt put on bipap without any improvement in pCo2 levels.  Family was not at bedside during evaluation.  Will need to obtain more information.  Pt previously admitted for 89 yo female pt with PMH: COPD, HTN, aspiration pna, and PE admitted and found to have hypercapneic on ABG.  As per ER chart, pt was seen at home for disimpaction by her house physician and sent her to ER for abnormal lung exam-b/l crackles.  In ER, pt was lethargic with dry cough.  ABG done and revealed hypercapnia.  Pt put on bipap without any improvement in pCo2 levels.  Family was not at bedside during evaluation.  Will need to obtain more information.  Pt previously admitted for respiratory failure and was intubated.

## 2018-01-17 NOTE — H&P ADULT - NSHPPHYSICALEXAM_GEN_ALL_CORE
PHYSICAL EXAM:    GENERAL: NAD, well-groomed, well-developed  HEAD:  Atraumatic, Normocephalic  EYES: unable to assess  NECK: Supple,   NERVOUS SYSTEM:  extremely lethargic, responds to painful stimulus  CHEST/LUNG: difficult to assess as there was leak in bipap mask  HEART: Regular rate and rhythm;   ABDOMEN: Soft, Nontender, Nondistended; Bowel sounds present  EXTREMITIES:  , No clubbing, cyanosis, or edema

## 2018-01-17 NOTE — ED PROVIDER NOTE - MEDICAL DECISION MAKING DETAILS
Pt in no significant distress. will place on bipap. Pt in no significant distress. will place on bipap. seen by icu for admission.

## 2018-01-17 NOTE — ED PROVIDER NOTE - PHYSICAL EXAMINATION
Gen: Alert, Well appearing. NAD    Head: NC, AT, PERRL, EOMI, normal lids/conjunctiva   ENT: Bilateral TM WNL, normal hearing, patent oropharynx without erythema/exudate, uvula midline  Neck: supple, no tenderness/meningismus/JVD   Pulm: rales Lt > rt, diminished diffusely  CV: RRR, no M/R/G, +dist pulses   Abd: soft, NT/ND, +BS, no guarding/rebound tenderness  Mskel: no edema/erythema/cyanosis   Skin: no rash   Neuro: winces to pain

## 2018-01-18 LAB
ANION GAP SERPL CALC-SCNC: 7 MMOL/L — SIGNIFICANT CHANGE UP (ref 5–17)
APPEARANCE UR: ABNORMAL
BASE EXCESS BLDA CALC-SCNC: 13.6 MMOL/L — HIGH (ref -2–2)
BILIRUB UR-MCNC: NEGATIVE — SIGNIFICANT CHANGE UP
BLOOD GAS COMMENTS: SIGNIFICANT CHANGE UP
BLOOD GAS COMMENTS: SIGNIFICANT CHANGE UP
BLOOD GAS SOURCE: SIGNIFICANT CHANGE UP
BUN SERPL-MCNC: 10 MG/DL — SIGNIFICANT CHANGE UP (ref 7–23)
CALCIUM SERPL-MCNC: 8.4 MG/DL — LOW (ref 8.5–10.1)
CHLORIDE SERPL-SCNC: 98 MMOL/L — SIGNIFICANT CHANGE UP (ref 96–108)
CO2 SERPL-SCNC: 33 MMOL/L — HIGH (ref 22–31)
COLOR SPEC: YELLOW — SIGNIFICANT CHANGE UP
COMMENT - URINE: SIGNIFICANT CHANGE UP
CREAT SERPL-MCNC: 0.57 MG/DL — SIGNIFICANT CHANGE UP (ref 0.5–1.3)
DIFF PNL FLD: NEGATIVE — SIGNIFICANT CHANGE UP
EPI CELLS # UR: ABNORMAL
GLUCOSE SERPL-MCNC: 86 MG/DL — SIGNIFICANT CHANGE UP (ref 70–99)
GLUCOSE UR QL: NEGATIVE MG/DL — SIGNIFICANT CHANGE UP
HCO3 BLDA-SCNC: 40 MMOL/L — HIGH (ref 21–29)
HCT VFR BLD CALC: 37.7 % — SIGNIFICANT CHANGE UP (ref 34.5–45)
HGB BLD-MCNC: 11.1 G/DL — LOW (ref 11.5–15.5)
HOROWITZ INDEX BLDA+IHG-RTO: 40 — SIGNIFICANT CHANGE UP
KETONES UR-MCNC: NEGATIVE — SIGNIFICANT CHANGE UP
LEUKOCYTE ESTERASE UR-ACNC: NEGATIVE — SIGNIFICANT CHANGE UP
MAGNESIUM SERPL-MCNC: 1.8 MG/DL — SIGNIFICANT CHANGE UP (ref 1.6–2.6)
MCHC RBC-ENTMCNC: 28.5 PG — SIGNIFICANT CHANGE UP (ref 27–34)
MCHC RBC-ENTMCNC: 29.4 GM/DL — LOW (ref 32–36)
MCV RBC AUTO: 96.8 FL — SIGNIFICANT CHANGE UP (ref 80–100)
NITRITE UR-MCNC: NEGATIVE — SIGNIFICANT CHANGE UP
PCO2 BLDA: 65 MMHG — HIGH (ref 32–46)
PH BLD: 7.41 — SIGNIFICANT CHANGE UP (ref 7.35–7.45)
PH UR: 8 — SIGNIFICANT CHANGE UP (ref 5–8)
PHOSPHATE SERPL-MCNC: 2.7 MG/DL — SIGNIFICANT CHANGE UP (ref 2.5–4.5)
PLATELET # BLD AUTO: 126 K/UL — LOW (ref 150–400)
PO2 BLDA: 78 MMHG — SIGNIFICANT CHANGE UP (ref 74–108)
POTASSIUM SERPL-MCNC: 4.6 MMOL/L — SIGNIFICANT CHANGE UP (ref 3.5–5.3)
POTASSIUM SERPL-SCNC: 4.6 MMOL/L — SIGNIFICANT CHANGE UP (ref 3.5–5.3)
PROCALCITONIN SERPL-MCNC: <0.05 NG/ML — SIGNIFICANT CHANGE UP (ref 0–0.04)
PROT UR-MCNC: 15 MG/DL
RBC # BLD: 3.9 M/UL — SIGNIFICANT CHANGE UP (ref 3.8–5.2)
RBC # FLD: 16.9 % — HIGH (ref 11–15)
SAO2 % BLDA: 95 % — SIGNIFICANT CHANGE UP (ref 92–96)
SODIUM SERPL-SCNC: 138 MMOL/L — SIGNIFICANT CHANGE UP (ref 135–145)
SP GR SPEC: 1.01 — SIGNIFICANT CHANGE UP (ref 1.01–1.02)
UROBILINOGEN FLD QL: NEGATIVE MG/DL — SIGNIFICANT CHANGE UP
WBC # BLD: 5.5 K/UL — SIGNIFICANT CHANGE UP (ref 3.8–10.5)
WBC # FLD AUTO: 5.5 K/UL — SIGNIFICANT CHANGE UP (ref 3.8–10.5)

## 2018-01-18 PROCEDURE — 71045 X-RAY EXAM CHEST 1 VIEW: CPT | Mod: 26

## 2018-01-18 PROCEDURE — 99291 CRITICAL CARE FIRST HOUR: CPT

## 2018-01-18 RX ORDER — POLYETHYLENE GLYCOL 3350 17 G/17G
17 POWDER, FOR SOLUTION ORAL DAILY
Qty: 0 | Refills: 0 | Status: DISCONTINUED | OUTPATIENT
Start: 2018-01-18 | End: 2018-01-30

## 2018-01-18 RX ORDER — DOCUSATE SODIUM 100 MG
100 CAPSULE ORAL DAILY
Qty: 0 | Refills: 0 | Status: DISCONTINUED | OUTPATIENT
Start: 2018-01-18 | End: 2018-01-22

## 2018-01-18 RX ORDER — ENOXAPARIN SODIUM 100 MG/ML
40 INJECTION SUBCUTANEOUS DAILY
Qty: 0 | Refills: 0 | Status: DISCONTINUED | OUTPATIENT
Start: 2018-01-18 | End: 2018-01-23

## 2018-01-18 RX ORDER — SENNA PLUS 8.6 MG/1
1 TABLET ORAL AT BEDTIME
Qty: 0 | Refills: 0 | Status: DISCONTINUED | OUTPATIENT
Start: 2018-01-18 | End: 2018-01-30

## 2018-01-18 RX ADMIN — Medication 40 MILLIGRAM(S): at 05:38

## 2018-01-18 RX ADMIN — CEFTRIAXONE 100 GRAM(S): 500 INJECTION, POWDER, FOR SOLUTION INTRAMUSCULAR; INTRAVENOUS at 17:31

## 2018-01-18 RX ADMIN — Medication 3 MILLILITER(S): at 01:46

## 2018-01-18 RX ADMIN — Medication 3 MILLILITER(S): at 17:11

## 2018-01-18 RX ADMIN — Medication 20 MILLIGRAM(S): at 23:00

## 2018-01-18 RX ADMIN — ENOXAPARIN SODIUM 40 MILLIGRAM(S): 100 INJECTION SUBCUTANEOUS at 13:12

## 2018-01-18 RX ADMIN — Medication 20 MILLIGRAM(S): at 13:13

## 2018-01-18 RX ADMIN — Medication 3 MILLILITER(S): at 11:11

## 2018-01-18 RX ADMIN — Medication 3 MILLILITER(S): at 05:13

## 2018-01-18 RX ADMIN — AZITHROMYCIN 255 MILLIGRAM(S): 500 TABLET, FILM COATED ORAL at 17:31

## 2018-01-18 RX ADMIN — SENNA PLUS 1 TABLET(S): 8.6 TABLET ORAL at 23:31

## 2018-01-18 NOTE — PATIENT PROFILE ADULT. - NSTOBACCO TYPE_GEN_A_CORE_RD
Before Your Surgery      Call your surgeon if there is any change in your health. This includes signs of a cold or flu (such as a sore throat, runny nose, cough, rash or fever).    Do not smoke, drink alcohol or take over the counter medicine (unless your surgeon or primary care doctor tells you to) for the 24 hours before and after surgery.    If you take prescribed drugs: Follow your doctor s orders about which medicines to take and which to stop until after surgery.    Eating and drinking prior to surgery: follow the instructions from your surgeon    Take a shower or bath the night before surgery. Use the soap your surgeon gave you to gently clean your skin. If you do not have soap from your surgeon, use your regular soap. Do not shave or scrub the surgery site.  Wear clean pajamas and have clean sheets on your bed.   
Cigarettes

## 2018-01-18 NOTE — PROGRESS NOTE ADULT - SUBJECTIVE AND OBJECTIVE BOX
HPI:  89F PMH: COPD, HTN, aspiration pna, and PE with prior resp failure requiring intubation presents to ER for lethargy.  ABG done with acute on chronic hypercapnia.  Placed on bipap. Admitted for hypercarbic respiratory failure requiring non invasive ventilation.     24 hr events:  ABG improved  pt awake and arousable but overall mental status poor (non verbal, and does not always follows commands)  weaned off bipap during the day to nasal cannula      ## ROS:  [x] unable to obtain non verbal state      ## Labs:  CBC:                        11.1   5.5   )-----------( 126      ( 18 Jan 2018 04:37 )             37.7     Chem:  01-18    138  |  98  |  10  ----------------------------<  86  4.6   |  33<H>  |  0.57    Ca    8.4<L>      18 Jan 2018 04:37  Phos  2.7     01-18  Mg     1.8     01-18    TPro  7.7  /  Alb  2.7<L>  /  TBili  0.2  /  DBili  x   /  AST  14<L>  /  ALT  12  /  AlkPhos  101  01-17    Coags:  PT/INR - ( 17 Jan 2018 14:55 )   PT: 10.9 sec;   INR: 1.00 ratio    PTT - ( 17 Jan 2018 14:55 )  PTT:27.9 sec    Rapid Respiratory Viral Panel (01.17.18 @ 18:16)    Rapid RVP Result: NotDetec      ## Imaging:  CXR < from: Xray Chest 1 View AP/PA. (01.18.18 @ 08:02) >  Improving right lower lobe patchy opacities compared to   1/17/2018. Persistent small right pleural effusion with associated   atelectasis and/or pneumonia.        ## Medications:  azithromycin  IVPB 500 milliGRAM(s) IV Intermittent every 24 hours  cefTRIAXone   IVPB 1 Gram(s) IV Intermittent every 24 hours      ALBUTerol    90 MICROgram(s) HFA Inhaler 1 Puff(s) Inhalation every 4 hours  ALBUTerol/ipratropium for Nebulization 3 milliLiter(s) Nebulizer every 6 hours  tiotropium 18 MICROgram(s) Capsule 1 Capsule(s) Inhalation daily    methylPREDNISolone sodium succinate Injectable 20 milliGRAM(s) IV Push every 8 hours    enoxaparin Injectable 40 milliGRAM(s) SubCutaneous daily    docusate sodium 100 milliGRAM(s) Oral daily  polyethylene glycol 3350 17 Gram(s) Oral daily  senna 1 Tablet(s) Oral at bedtime        ## Vitals:  T(C): 37.7 (01-18-18 @ 15:25), Max: 37.9 (01-18-18 @ 14:00)  HR: 92 (01-18-18 @ 20:00) (60 - 99)  BP: 133/89 (01-18-18 @ 20:00) (80/58 - 172/157)  BP(mean): 104 (01-18-18 @ 20:00) (66 - 164)  RR: 17 (01-18-18 @ 20:00) (11 - 22)  SpO2: 99% (01-18-18 @ 20:00) (91% - 100%)  ABG: ABG - ( 18 Jan 2018 06:45 )  pH: 7.41     /  pCO2: 65    /  pO2: 78    / HCO3: 40    / Base Excess: 13.6  /  SaO2: 95            01-18 @ 07:01 - 01-18 @ 21:49  --------------------------------------------------------  IN: 0 mL / OUT: 500 mL / NET: -500 mL          ## P/E:  Gen: lying comfortably in bed in no apparent distress, awake, but not speaking  HEENT: PERRL, EOMI  Resp: CTA B/L no c/r/w  CVS: S1S2 RRR  Abd: soft NT/ND +BS  Ext: no c/c/e  Neuro: awake, not speaking    CENTRAL LINE: [ ] YES [x] NO    A-LINE:  [ ] YES [x] NO      GLOBAL ISSUE/BEST PRACTICE:  Analgesia: n/a  Sedation: n/a  HOB elevation: yes  Stress ulcer prophylaxis: n/a  VTE prophylaxis: lovenox  Oral Care: n/a  Glycemic control: n/a  Nutrition: puree    CODE STATUS: [x] full code  [ ] DNR  [ ] DNI  [ ] MOLST  Goals of care discussion: [ ] yes - awaiting family to arrive to bedside

## 2018-01-19 LAB
ALBUMIN SERPL ELPH-MCNC: 2.9 G/DL — LOW (ref 3.3–5)
ALP SERPL-CCNC: 102 U/L — SIGNIFICANT CHANGE UP (ref 40–120)
ALT FLD-CCNC: 13 U/L — SIGNIFICANT CHANGE UP (ref 12–78)
ANION GAP SERPL CALC-SCNC: 8 MMOL/L — SIGNIFICANT CHANGE UP (ref 5–17)
AST SERPL-CCNC: 17 U/L — SIGNIFICANT CHANGE UP (ref 15–37)
BASE EXCESS BLDA CALC-SCNC: 12 MMOL/L — HIGH (ref -2–2)
BASOPHILS # BLD AUTO: 0 K/UL — SIGNIFICANT CHANGE UP (ref 0–0.2)
BASOPHILS NFR BLD AUTO: 0.9 % — SIGNIFICANT CHANGE UP (ref 0–2)
BILIRUB SERPL-MCNC: 0.4 MG/DL — SIGNIFICANT CHANGE UP (ref 0.2–1.2)
BLOOD GAS COMMENTS: SIGNIFICANT CHANGE UP
BLOOD GAS SOURCE: SIGNIFICANT CHANGE UP
BUN SERPL-MCNC: 10 MG/DL — SIGNIFICANT CHANGE UP (ref 7–23)
CALCIUM SERPL-MCNC: 9 MG/DL — SIGNIFICANT CHANGE UP (ref 8.5–10.1)
CHLORIDE SERPL-SCNC: 98 MMOL/L — SIGNIFICANT CHANGE UP (ref 96–108)
CO2 SERPL-SCNC: 34 MMOL/L — HIGH (ref 22–31)
CREAT SERPL-MCNC: 0.6 MG/DL — SIGNIFICANT CHANGE UP (ref 0.5–1.3)
EOSINOPHIL # BLD AUTO: 0 K/UL — SIGNIFICANT CHANGE UP (ref 0–0.5)
EOSINOPHIL NFR BLD AUTO: 0 % — SIGNIFICANT CHANGE UP (ref 0–6)
GLUCOSE SERPL-MCNC: 115 MG/DL — HIGH (ref 70–99)
HCO3 BLDA-SCNC: 36 MMOL/L — HIGH (ref 21–29)
HCT VFR BLD CALC: 32.8 % — LOW (ref 34.5–45)
HGB BLD-MCNC: 10.3 G/DL — LOW (ref 11.5–15.5)
HOROWITZ INDEX BLDA+IHG-RTO: 28 — SIGNIFICANT CHANGE UP
LYMPHOCYTES # BLD AUTO: 1.4 K/UL — SIGNIFICANT CHANGE UP (ref 1–3.3)
LYMPHOCYTES # BLD AUTO: 33.9 % — SIGNIFICANT CHANGE UP (ref 13–44)
MAGNESIUM SERPL-MCNC: 1.8 MG/DL — SIGNIFICANT CHANGE UP (ref 1.6–2.6)
MCHC RBC-ENTMCNC: 29 PG — SIGNIFICANT CHANGE UP (ref 27–34)
MCHC RBC-ENTMCNC: 31.5 GM/DL — LOW (ref 32–36)
MCV RBC AUTO: 92.1 FL — SIGNIFICANT CHANGE UP (ref 80–100)
MONOCYTES # BLD AUTO: 0.3 K/UL — SIGNIFICANT CHANGE UP (ref 0–0.9)
MONOCYTES NFR BLD AUTO: 7.2 % — SIGNIFICANT CHANGE UP (ref 2–14)
NEUTROPHILS # BLD AUTO: 2.3 K/UL — SIGNIFICANT CHANGE UP (ref 1.8–7.4)
NEUTROPHILS NFR BLD AUTO: 58 % — SIGNIFICANT CHANGE UP (ref 43–77)
PCO2 BLDA: 44 MMHG — SIGNIFICANT CHANGE UP (ref 32–46)
PH BLD: 7.52 — HIGH (ref 7.35–7.45)
PHOSPHATE SERPL-MCNC: 2.3 MG/DL — LOW (ref 2.5–4.5)
PLATELET # BLD AUTO: 251 K/UL — SIGNIFICANT CHANGE UP (ref 150–400)
PO2 BLDA: 50 MMHG — CRITICAL LOW (ref 74–108)
POTASSIUM SERPL-MCNC: 3.9 MMOL/L — SIGNIFICANT CHANGE UP (ref 3.5–5.3)
POTASSIUM SERPL-SCNC: 3.9 MMOL/L — SIGNIFICANT CHANGE UP (ref 3.5–5.3)
PROCALCITONIN SERPL-MCNC: <0.05 NG/ML — SIGNIFICANT CHANGE UP (ref 0–0.04)
PROT SERPL-MCNC: 8 GM/DL — SIGNIFICANT CHANGE UP (ref 6–8.3)
RBC # BLD: 3.56 M/UL — LOW (ref 3.8–5.2)
RBC # FLD: 16.4 % — HIGH (ref 11–15)
SAO2 % BLDA: 85 % — LOW (ref 92–96)
SODIUM SERPL-SCNC: 140 MMOL/L — SIGNIFICANT CHANGE UP (ref 135–145)
WBC # BLD: 4 K/UL — SIGNIFICANT CHANGE UP (ref 3.8–10.5)
WBC # FLD AUTO: 4 K/UL — SIGNIFICANT CHANGE UP (ref 3.8–10.5)

## 2018-01-19 PROCEDURE — 99232 SBSQ HOSP IP/OBS MODERATE 35: CPT

## 2018-01-19 RX ORDER — CLONAZEPAM 1 MG
0.5 TABLET ORAL THREE TIMES A DAY
Qty: 0 | Refills: 0 | Status: DISCONTINUED | OUTPATIENT
Start: 2018-01-19 | End: 2018-01-26

## 2018-01-19 RX ORDER — HYDRALAZINE HCL 50 MG
25 TABLET ORAL ONCE
Qty: 0 | Refills: 0 | Status: COMPLETED | OUTPATIENT
Start: 2018-01-19 | End: 2018-01-19

## 2018-01-19 RX ORDER — QUETIAPINE FUMARATE 200 MG/1
25 TABLET, FILM COATED ORAL
Qty: 0 | Refills: 0 | Status: DISCONTINUED | OUTPATIENT
Start: 2018-01-19 | End: 2018-01-30

## 2018-01-19 RX ORDER — HYDRALAZINE HCL 50 MG
TABLET ORAL
Qty: 0 | Refills: 0 | Status: DISCONTINUED | OUTPATIENT
Start: 2018-01-19 | End: 2018-01-30

## 2018-01-19 RX ORDER — CEFTRIAXONE 500 MG/1
1 INJECTION, POWDER, FOR SOLUTION INTRAMUSCULAR; INTRAVENOUS
Qty: 0 | Refills: 0 | Status: DISCONTINUED | OUTPATIENT
Start: 2018-01-19 | End: 2018-01-19

## 2018-01-19 RX ORDER — HYDRALAZINE HCL 50 MG
25 TABLET ORAL
Qty: 0 | Refills: 0 | Status: DISCONTINUED | OUTPATIENT
Start: 2018-01-20 | End: 2018-01-30

## 2018-01-19 RX ORDER — AMLODIPINE BESYLATE 2.5 MG/1
5 TABLET ORAL DAILY
Qty: 0 | Refills: 0 | Status: DISCONTINUED | OUTPATIENT
Start: 2018-01-19 | End: 2018-01-20

## 2018-01-19 RX ADMIN — Medication 40 MILLIGRAM(S): at 12:58

## 2018-01-19 RX ADMIN — AMLODIPINE BESYLATE 5 MILLIGRAM(S): 2.5 TABLET ORAL at 12:49

## 2018-01-19 RX ADMIN — Medication 3 MILLILITER(S): at 23:51

## 2018-01-19 RX ADMIN — Medication 0.5 MILLIGRAM(S): at 22:31

## 2018-01-19 RX ADMIN — Medication 100 MILLIGRAM(S): at 12:45

## 2018-01-19 RX ADMIN — Medication 3 MILLILITER(S): at 00:41

## 2018-01-19 RX ADMIN — QUETIAPINE FUMARATE 25 MILLIGRAM(S): 200 TABLET, FILM COATED ORAL at 19:55

## 2018-01-19 RX ADMIN — AZITHROMYCIN 255 MILLIGRAM(S): 500 TABLET, FILM COATED ORAL at 17:22

## 2018-01-19 RX ADMIN — Medication 3 MILLILITER(S): at 05:40

## 2018-01-19 RX ADMIN — Medication 3 MILLILITER(S): at 17:04

## 2018-01-19 RX ADMIN — Medication 20 MILLIGRAM(S): at 05:55

## 2018-01-19 RX ADMIN — Medication 25 MILLIGRAM(S): at 19:55

## 2018-01-19 RX ADMIN — Medication 62.5 MILLIMOLE(S): at 07:40

## 2018-01-19 RX ADMIN — Medication 3 MILLILITER(S): at 12:07

## 2018-01-19 RX ADMIN — POLYETHYLENE GLYCOL 3350 17 GRAM(S): 17 POWDER, FOR SOLUTION ORAL at 12:45

## 2018-01-19 RX ADMIN — ENOXAPARIN SODIUM 40 MILLIGRAM(S): 100 INJECTION SUBCUTANEOUS at 12:45

## 2018-01-19 RX ADMIN — SENNA PLUS 1 TABLET(S): 8.6 TABLET ORAL at 22:31

## 2018-01-19 NOTE — DIETITIAN INITIAL EVALUATION ADULT. - ENERGY NEEDS
Height (cm): 147.32 (01-18)  Weight (kg): 60.6 (01-19)  BMI (kg/m2): 27.9 (01-19)  Ideal Body Weight: 43.8 kg+/-10%  % Ideal Body Weight: 140%

## 2018-01-19 NOTE — DIETITIAN INITIAL EVALUATION ADULT. - NS AS NUTRI INTERV MEALS SNACK
dysphagia 1 pureed honey thick liquids/Texture-modified diet/Fluid - modified diet dysphagia 1 pureed honey thick liquids; low Na/Texture-modified diet/Fluid - modified diet/Mineral - modified diet

## 2018-01-19 NOTE — DIETITIAN INITIAL EVALUATION ADULT. - PERTINENT LABORATORY DATA
01-19 Na140 mmol/L Glu 115 mg/dL<H> K+ 3.9 mmol/L Cr  0.60 mg/dL BUN 10 mg/dL Phos 2.3 mg/dL<L> Alb 2.9 g/dL<L> PAB n/a

## 2018-01-19 NOTE — DIETITIAN INITIAL EVALUATION ADULT. - OTHER INFO
pt seen due to ICU admission and consult for FTT. pt admit in hypercapnic resp failure. as per flowsheet, po intake 100% on puree texture, honey thick fluids. tried to call daughter for diet, wt hx but unable to reach her. on nocturnal bipap and prn pt seen due to ICU admission and consult for FTT. pt admit in hypercapnic resp failure. as per RN and flowsheet, po intake 100% at all meals so far. pt tolerating puree texture, honey thick fluids. unsure why FTT would be a concern. tried to call daughter for diet, wt hx but unable to reach her. on nocturnal bipap and prn

## 2018-01-19 NOTE — PROGRESS NOTE ADULT - SUBJECTIVE AND OBJECTIVE BOX
HPI:  89 yo female pt with PMH: COPD, HTN, aspiration pna, and PE admitted and found to have hypercapneic on ABG.  As per ER chart, pt was seen at home for disimpaction by her house physician and sent her to ER for abnormal lung exam-b/l crackles.  In ER, pt was lethargic with dry cough.  ABG done and revealed hypercapnia.  Pt put on bipap without any improvement in pCo2 levels.  Family was not at bedside during evaluation.  Will need to obtain more information.  Pt previously admitted for respiratory failure and was intubated. (17 Jan 2018 18:07)      24 hr events:      ## ROS:  [ ] unable to obtain  CONSTITUTIONAL: No fever, weight loss, or fatigue  EYES: No eye pain, visual disturbances, or discharge  ENMT:  No difficulty hearing, tinnitus, vertigo; No sinus or throat pain  NECK: No pain or stiffness  RESPIRATORY: No cough, wheezing, chills or hemoptysis; No shortness of breath  CARDIOVASCULAR: No chest pain, palpitations, dizziness, or leg swelling  GASTROINTESTINAL: No abdominal or epigastric pain. No nausea, vomiting, or hematemesis; No diarrhea or constipation. No melena or hematochezia.  GENITOURINARY: No dysuria, frequency, hematuria, or incontinence  NEUROLOGICAL: No headaches, memory loss, loss of strength, numbness, or tremors  SKIN: No itching, burning, rashes, or lesions   LYMPH NODES: No enlarged glands  ENDOCRINE: No heat or cold intolerance; No hair loss  MUSCULOSKELETAL: No joint pain or swelling; No muscle, back, or extremity pain  PSYCHIATRIC: No depression, anxiety, mood swings, or difficulty sleeping  HEME/LYMPH: No easy bruising, or bleeding gums  ALLERGY AND IMMUNOLOGIC: No hives or eczema    ## Labs:  CBC:                        10.3   4.0   )-----------( 251      ( 19 Jan 2018 04:26 )             32.8     Chem:  01-19    140  |  98  |  10  ----------------------------<  115<H>  3.9   |  34<H>  |  0.60    Ca    9.0      19 Jan 2018 04:26  Phos  2.3     01-19  Mg     1.8     01-19    TPro  8.0  /  Alb  2.9<L>  /  TBili  0.4  /  DBili  x   /  AST  17  /  ALT  13  /  AlkPhos  102  01-19    Coags:          ## Imaging:    ## Medications:  azithromycin  IVPB      azithromycin  IVPB 500 milliGRAM(s) IV Intermittent every 24 hours    amLODIPine   Tablet 5 milliGRAM(s) Oral daily    ALBUTerol    90 MICROgram(s) HFA Inhaler 1 Puff(s) Inhalation every 4 hours  ALBUTerol/ipratropium for Nebulization 3 milliLiter(s) Nebulizer every 6 hours  tiotropium 18 MICROgram(s) Capsule 1 Capsule(s) Inhalation daily    predniSONE   Tablet 40 milliGRAM(s) Oral daily    enoxaparin Injectable 40 milliGRAM(s) SubCutaneous daily    docusate sodium 100 milliGRAM(s) Oral daily  polyethylene glycol 3350 17 Gram(s) Oral daily  senna 1 Tablet(s) Oral at bedtime        ## Vitals:  T(C): 37.5 (01-19-18 @ 15:28), Max: 38.1 (01-18-18 @ 23:58)  HR: 100 (01-19-18 @ 12:16) (51 - 103)  BP: 191/78 (01-19-18 @ 10:00) (121/104 - 217/200)  BP(mean): 104 (01-19-18 @ 10:00) (89 - 206)  RR: 24 (01-19-18 @ 11:00) (12 - 24)  SpO2: 96% (01-19-18 @ 12:16) (91% - 100%)  Wt(kg): --  Vent:   ABG: ABG - ( 18 Jan 2018 06:45 )  pH: x     /  pCO2: 65    /  pO2: 78    / HCO3: 40    / Base Excess: 13.6  /  SaO2: 95                    01-18 @ 07:01 - 01-19 @ 07:00  --------------------------------------------------------  IN: 0 mL / OUT: 500 mL / NET: -500 mL    01-19 @ 07:01 - 01-19 @ 15:49  --------------------------------------------------------  IN: 486 mL / OUT: 1 mL / NET: 485 mL          ## P/E:  Gen: lying comfortably in bed in no apparent distress  HEENT: PERRL, EOMI  Resp: CTA B/L no c/r/w  CVS: S1S2 no m/r/g  Abd: soft NT/ND +BS  Ext: no c/c/e  Neuro: A&Ox3    CENTRAL LINE: [ ] YES [ ] NO  LOCATION:   DATE INSERTED:  REMOVE: [ ] YES [ ] NO      GARCIA: [ ] YES [ ] NO    DATE INSERTED:  REMOVE:  [ ] YES [ ] NO      A-LINE:  [ ] YES [ ] NO  LOCATION:   DATE INSERTED:  REMOVE:  [ ] YES [ ] NO  EXPLAIN:    GLOBAL ISSUE/BEST PRACTICE:  Analgesia:  Sedation:  HOB elevation: yes  Stress ulcer prophylaxis:  VTE prophylaxis:  Oral Care:  Glycemic control:  Nutrition:    CODE STATUS: [ ] full code  [ ] DNR  [ ] DNI  [ ] [facility]  Goals of care discussion: [ ] yes HPI:  89F PMH: COPD, HTN, aspiration pna, and PE with prior resp failure requiring intubation, ?dementia presents to ER for lethargy.  ABG done with acute on chronic hypercapnia.  Placed on bipap. Admitted for hypercarbic respiratory failure requiring non invasive ventilation.     24 hr events:  awake, rambling in creole  on nocturnal bipap  nasal cannula during the day      ## ROS:  [x] unable to obtain due to mental status      ## Labs:  CBC:                        10.3   4.0   )-----------( 251      ( 19 Jan 2018 04:26 )             32.8     Chem:  01-19    140  |  98  |  10  ----------------------------<  115<H>  3.9   |  34<H>  |  0.60    Ca    9.0      19 Jan 2018 04:26  Phos  2.3     01-19  Mg     1.8     01-19    TPro  8.0  /  Alb  2.9<L>  /  TBili  0.4  /  DBili  x   /  AST  17  /  ALT  13  /  AlkPhos  102  01-19      Rapid Respiratory Viral Panel (01.17.18 @ 18:16)    Rapid RVP Result: NotDetec        ## Imaging:  no new imaging    ## Medications:  azithromycin  IVPB 500 milliGRAM(s) IV Intermittent every 24 hours    amLODIPine   Tablet 5 milliGRAM(s) Oral daily    ALBUTerol    90 MICROgram(s) HFA Inhaler 1 Puff(s) Inhalation every 4 hours  ALBUTerol/ipratropium for Nebulization 3 milliLiter(s) Nebulizer every 6 hours  tiotropium 18 MICROgram(s) Capsule 1 Capsule(s) Inhalation daily    predniSONE   Tablet 40 milliGRAM(s) Oral daily    enoxaparin Injectable 40 milliGRAM(s) SubCutaneous daily    docusate sodium 100 milliGRAM(s) Oral daily  polyethylene glycol 3350 17 Gram(s) Oral daily  senna 1 Tablet(s) Oral at bedtime        ## Vitals:  T(C): 37.5 (01-19-18 @ 15:28), Max: 38.1 (01-18-18 @ 23:58)  HR: 100 (01-19-18 @ 12:16) (51 - 103)  BP: 191/78 (01-19-18 @ 10:00) (121/104 - 217/200)  BP(mean): 104 (01-19-18 @ 10:00) (89 - 206)  RR: 24 (01-19-18 @ 11:00) (12 - 24)  SpO2: 96% (01-19-18 @ 12:16) (91% - 100%)      01-18 @ 07:01  -  01-19 @ 07:00  --------------------------------------------------------  IN: 0 mL / OUT: 500 mL / NET: -500 mL    01-19 @ 07:01  -  01-19 @ 15:49  --------------------------------------------------------  IN: 486 mL / OUT: 1 mL / NET: 485 mL          ## P/E:  Gen: lying comfortably in bed in no apparent distress, awake, yesterday not speaking, today rambling in [place]  HEENT: PERRL, EOMI  Resp: CTA B/L no c/r/w  CVS: S1S2 RRR  Abd: soft NT/ND +BS  Ext: no c/c/e  Neuro: awake, not following commands    CENTRAL LINE: [ ] YES [x] NO    A-LINE:  [ ] YES [x] NO      GLOBAL ISSUE/BEST PRACTICE:  Analgesia: n/a  Sedation: n/a  HOB elevation: yes  Stress ulcer prophylaxis: n/a  VTE prophylaxis: lovenox  Oral Care: n/a  Glycemic control: n/a  Nutrition: puree    CODE STATUS: [x] full code  [ ] DNR  [ ] DNI  [ ] [facility]  Goals of care discussion: [ ] yes - awaiting family to arrive to bedside

## 2018-01-19 NOTE — DIETITIAN INITIAL EVALUATION ADULT. - PROBLEM SELECTOR PLAN 1
pt started on bipap in ER with 20/6/40%.  There was leak in bipap mask. Will repeat ABG as the second ABG may be inaccurate due to the leak.  Smaller mask was fitted on to pt.  Will check Rapid viral panel and pancultures.  If pCO2 still on the rise, may need intubation.  CXR concerning for pneumonia. Will start empiric iv abx.  check sputum culture.

## 2018-01-20 LAB
ALBUMIN SERPL ELPH-MCNC: 2.6 G/DL — LOW (ref 3.3–5)
ALP SERPL-CCNC: 82 U/L — SIGNIFICANT CHANGE UP (ref 40–120)
ALT FLD-CCNC: 12 U/L — SIGNIFICANT CHANGE UP (ref 12–78)
ANION GAP SERPL CALC-SCNC: 7 MMOL/L — SIGNIFICANT CHANGE UP (ref 5–17)
ANISOCYTOSIS BLD QL: SLIGHT — SIGNIFICANT CHANGE UP
AST SERPL-CCNC: 19 U/L — SIGNIFICANT CHANGE UP (ref 15–37)
BASOPHILS NFR BLD AUTO: 1 % — SIGNIFICANT CHANGE UP (ref 0–2)
BILIRUB SERPL-MCNC: 0.3 MG/DL — SIGNIFICANT CHANGE UP (ref 0.2–1.2)
BUN SERPL-MCNC: 20 MG/DL — SIGNIFICANT CHANGE UP (ref 7–23)
CALCIUM SERPL-MCNC: 8.7 MG/DL — SIGNIFICANT CHANGE UP (ref 8.5–10.1)
CHLORIDE SERPL-SCNC: 102 MMOL/L — SIGNIFICANT CHANGE UP (ref 96–108)
CO2 SERPL-SCNC: 35 MMOL/L — HIGH (ref 22–31)
CREAT SERPL-MCNC: 0.8 MG/DL — SIGNIFICANT CHANGE UP (ref 0.5–1.3)
DACRYOCYTES BLD QL SMEAR: SLIGHT — SIGNIFICANT CHANGE UP
GLUCOSE SERPL-MCNC: 96 MG/DL — SIGNIFICANT CHANGE UP (ref 70–99)
HCT VFR BLD CALC: 35.3 % — SIGNIFICANT CHANGE UP (ref 34.5–45)
HGB BLD-MCNC: 11.3 G/DL — LOW (ref 11.5–15.5)
HYPOCHROMIA BLD QL: SLIGHT — SIGNIFICANT CHANGE UP
LYMPHOCYTES # BLD AUTO: 37 % — SIGNIFICANT CHANGE UP (ref 13–44)
MACROCYTES BLD QL: SLIGHT — SIGNIFICANT CHANGE UP
MAGNESIUM SERPL-MCNC: 1.9 MG/DL — SIGNIFICANT CHANGE UP (ref 1.6–2.6)
MCHC RBC-ENTMCNC: 29.8 PG — SIGNIFICANT CHANGE UP (ref 27–34)
MCHC RBC-ENTMCNC: 31.9 GM/DL — LOW (ref 32–36)
MCV RBC AUTO: 93.4 FL — SIGNIFICANT CHANGE UP (ref 80–100)
MICROCYTES BLD QL: SLIGHT — SIGNIFICANT CHANGE UP
MONOCYTES NFR BLD AUTO: 17 % — HIGH (ref 2–14)
NEUTROPHILS NFR BLD AUTO: 45 % — SIGNIFICANT CHANGE UP (ref 43–77)
OVALOCYTES BLD QL SMEAR: SLIGHT — SIGNIFICANT CHANGE UP
PHOSPHATE SERPL-MCNC: 3 MG/DL — SIGNIFICANT CHANGE UP (ref 2.5–4.5)
PLAT MORPH BLD: NORMAL — SIGNIFICANT CHANGE UP
PLATELET # BLD AUTO: 225 K/UL — SIGNIFICANT CHANGE UP (ref 150–400)
POLYCHROMASIA BLD QL SMEAR: SLIGHT — SIGNIFICANT CHANGE UP
POTASSIUM SERPL-MCNC: 3.7 MMOL/L — SIGNIFICANT CHANGE UP (ref 3.5–5.3)
POTASSIUM SERPL-SCNC: 3.7 MMOL/L — SIGNIFICANT CHANGE UP (ref 3.5–5.3)
PROT SERPL-MCNC: 7.4 GM/DL — SIGNIFICANT CHANGE UP (ref 6–8.3)
RBC # BLD: 3.78 M/UL — LOW (ref 3.8–5.2)
RBC # FLD: 17 % — HIGH (ref 11–15)
RBC BLD AUTO: ABNORMAL
SCHISTOCYTES BLD QL AUTO: SLIGHT — SIGNIFICANT CHANGE UP
SODIUM SERPL-SCNC: 144 MMOL/L — SIGNIFICANT CHANGE UP (ref 135–145)
WBC # BLD: 5.8 K/UL — SIGNIFICANT CHANGE UP (ref 3.8–10.5)
WBC # FLD AUTO: 5.8 K/UL — SIGNIFICANT CHANGE UP (ref 3.8–10.5)

## 2018-01-20 PROCEDURE — 99223 1ST HOSP IP/OBS HIGH 75: CPT

## 2018-01-20 PROCEDURE — 99232 SBSQ HOSP IP/OBS MODERATE 35: CPT

## 2018-01-20 RX ORDER — CEFTRIAXONE 500 MG/1
INJECTION, POWDER, FOR SOLUTION INTRAMUSCULAR; INTRAVENOUS
Qty: 0 | Refills: 0 | Status: COMPLETED | OUTPATIENT
Start: 2018-01-20 | End: 2018-01-22

## 2018-01-20 RX ORDER — CEFTRIAXONE 500 MG/1
1 INJECTION, POWDER, FOR SOLUTION INTRAMUSCULAR; INTRAVENOUS ONCE
Qty: 0 | Refills: 0 | Status: COMPLETED | OUTPATIENT
Start: 2018-01-20 | End: 2018-01-20

## 2018-01-20 RX ORDER — CEFTRIAXONE 500 MG/1
1 INJECTION, POWDER, FOR SOLUTION INTRAMUSCULAR; INTRAVENOUS ONCE
Qty: 0 | Refills: 0 | Status: DISCONTINUED | OUTPATIENT
Start: 2018-01-20 | End: 2018-01-20

## 2018-01-20 RX ORDER — CEFTRIAXONE 500 MG/1
1 INJECTION, POWDER, FOR SOLUTION INTRAMUSCULAR; INTRAVENOUS EVERY 24 HOURS
Qty: 0 | Refills: 0 | Status: COMPLETED | OUTPATIENT
Start: 2018-01-21 | End: 2018-01-22

## 2018-01-20 RX ORDER — CEFTRIAXONE 500 MG/1
INJECTION, POWDER, FOR SOLUTION INTRAMUSCULAR; INTRAVENOUS
Qty: 0 | Refills: 0 | Status: DISCONTINUED | OUTPATIENT
Start: 2018-01-20 | End: 2018-01-20

## 2018-01-20 RX ADMIN — AMLODIPINE BESYLATE 5 MILLIGRAM(S): 2.5 TABLET ORAL at 06:17

## 2018-01-20 RX ADMIN — QUETIAPINE FUMARATE 25 MILLIGRAM(S): 200 TABLET, FILM COATED ORAL at 18:01

## 2018-01-20 RX ADMIN — SENNA PLUS 1 TABLET(S): 8.6 TABLET ORAL at 21:58

## 2018-01-20 RX ADMIN — Medication 3 MILLILITER(S): at 05:50

## 2018-01-20 RX ADMIN — ENOXAPARIN SODIUM 40 MILLIGRAM(S): 100 INJECTION SUBCUTANEOUS at 12:41

## 2018-01-20 RX ADMIN — Medication 40 MILLIGRAM(S): at 06:17

## 2018-01-20 RX ADMIN — Medication 3 MILLILITER(S): at 11:58

## 2018-01-20 RX ADMIN — Medication 3 MILLILITER(S): at 23:05

## 2018-01-20 RX ADMIN — Medication 100 MILLIGRAM(S): at 12:36

## 2018-01-20 RX ADMIN — AZITHROMYCIN 255 MILLIGRAM(S): 500 TABLET, FILM COATED ORAL at 18:00

## 2018-01-20 RX ADMIN — Medication 3 MILLILITER(S): at 17:43

## 2018-01-20 RX ADMIN — POLYETHYLENE GLYCOL 3350 17 GRAM(S): 17 POWDER, FOR SOLUTION ORAL at 12:36

## 2018-01-20 RX ADMIN — Medication 25 MILLIGRAM(S): at 06:17

## 2018-01-20 RX ADMIN — CEFTRIAXONE 100 GRAM(S): 500 INJECTION, POWDER, FOR SOLUTION INTRAMUSCULAR; INTRAVENOUS at 14:06

## 2018-01-20 NOTE — PROGRESS NOTE ADULT - SUBJECTIVE AND OBJECTIVE BOX
89F PMH: advanced dementia, COPD, HTN and PE with prior resp failure requiring intubation, p/w PNA and acute on chronic hypercapnia requiring bipap.    Pt with episodes of 2nd degree block.   Pt w/o hypotension.   Pt not speaking but responds to stimuli    ## ROS:  [x] unable to obtain due to mental status                          11.3   5.8   )-----------( 225      ( 20 Jan 2018 03:53 )             35.3   01-20    144  |  102  |  20  ----------------------------<  96  3.7   |  35<H>  |  0.80    Ca    8.7      20 Jan 2018 03:53  Phos  3.0     01-20  Mg     1.9     01-20    TPro  7.4  /  Alb  2.6<L>  /  TBili  0.3  /  DBili  x   /  AST  19  /  ALT  12  /  AlkPhos  82  01-20    MEDICATIONS  (STANDING):  ALBUTerol    90 MICROgram(s) HFA Inhaler 1 Puff(s) Inhalation every 4 hours  ALBUTerol/ipratropium for Nebulization 3 milliLiter(s) Nebulizer every 6 hours  azithromycin  IVPB      azithromycin  IVPB 500 milliGRAM(s) IV Intermittent every 24 hours  cefTRIAXone   IVPB      docusate sodium 100 milliGRAM(s) Oral daily  enoxaparin Injectable 40 milliGRAM(s) SubCutaneous daily  hydrALAZINE 25 milliGRAM(s) Oral two times a day  hydrALAZINE      polyethylene glycol 3350 17 Gram(s) Oral daily  predniSONE   Tablet 40 milliGRAM(s) Oral daily  QUEtiapine 25 milliGRAM(s) Oral two times a day  senna 1 Tablet(s) Oral at bedtime  tiotropium 18 MICROgram(s) Capsule 1 Capsule(s) Inhalation daily    ICU Vital Signs Last 24 Hrs  T(C): 36.7 (20 Jan 2018 12:00), Max: 38.1 (19 Jan 2018 20:59)  T(F): 98 (20 Jan 2018 12:00), Max: 100.5 (19 Jan 2018 20:59)  HR: 91 (20 Jan 2018 16:00) (42 - 135)  BP: 132/91 (20 Jan 2018 16:00) (80/47 - 193/108)  BP(mean): 100 (20 Jan 2018 16:00) (57 - 141)  ABP: --  ABP(mean): --  RR: 26 (20 Jan 2018 16:00) (14 - 35)  SpO2: 99% (20 Jan 2018 16:00) (85% - 100%)    ## P/E:  Gen: lying comfortably in bed in no apparent distress, HEENT: PERRL, EOMI  Resp: CTA B/L no c/r/w  CVS: S1S2 RRR  Abd: soft NT/ND +BS  Ext: no c/c/e  Neuro: awake, not following commands    CENTRAL LINE: [ ] YES [x] NO    A-LINE:  [ ] YES [x] NO      GLOBAL ISSUE/BEST PRACTICE:  Analgesia: n/a  Sedation: n/a  HOB elevation: yes  Stress ulcer prophylaxis: n/a  VTE prophylaxis: lovenox  Oral Care: n/a  Glycemic control: n/a  Nutrition: puree    CODE STATUS: [x] full code  [ ] DNR  [ ] DNI  [ ] MOLST  Goals of care discussion: [ ] yes - awaiting family to arrive to bedside

## 2018-01-20 NOTE — CONSULT NOTE ADULT - SUBJECTIVE AND OBJECTIVE BOX
CARDIOLOGY CONSULT NOTE    01-20-18 @ 09:00  KIN BREWSTER is a 89y Female with a known history of :  Hypercapnic respiratory failure (J96.92)    HPI:  89 yo female pt with PMH: COPD, HTN, aspiration pna, and PE admitted and found to have hypercapneic on ABG.  As per ER chart, pt was seen at home for disimpaction by her house physician and sent her to ER for abnormal lung exam-b/l crackles.  In ER, pt was lethargic with dry cough.  ABG done and revealed hypercapnia.  Pt put on bipap without any improvement in pCo2 levels.  Family was not at bedside during evaluation.  Will need to obtain more information.  Pt previously admitted for respiratory failure and was intubated. (17 Jan 2018 18:07)      REVIEW OF SYSTEMS:    CONSTITUTIONAL: No fever, weight loss, or fatigue  EYES: No eye pain, visual disturbances, or discharge  ENMT:  No difficulty hearing, tinnitus, vertigo; No sinus or throat pain  NECK: No pain or stiffness  BREASTS: No pain, masses, or nipple discharge  RESPIRATORY: No cough, wheezing, chills or hemoptysis; No shortness of breath  CARDIOVASCULAR: No chest pain, palpitations, dizziness, or leg swelling  GASTROINTESTINAL: No abdominal or epigastric pain. No nausea, vomiting, or hematemesis; No diarrhea or constipation. No melena or hematochezia.  GENITOURINARY: No dysuria, frequency, hematuria, or incontinence  NEUROLOGICAL: No headaches, memory loss, loss of strength, numbness, or tremors  SKIN: No itching, burning, rashes, or lesions   LYMPH NODES: No enlarged glands  ENDOCRINE: No heat or cold intolerance; No hair loss  MUSCULOSKELETAL: No joint pain or swelling; No muscle, back, or extremity pain  PSYCHIATRIC: No depression, anxiety, mood swings, or difficulty sleeping  HEME/LYMPH: No easy bruising, or bleeding gums  ALLERGY AND IMMUNOLOGIC: No hives or eczema    MEDICATIONS  (STANDING):  ALBUTerol    90 MICROgram(s) HFA Inhaler 1 Puff(s) Inhalation every 4 hours  ALBUTerol/ipratropium for Nebulization 3 milliLiter(s) Nebulizer every 6 hours  azithromycin  IVPB      azithromycin  IVPB 500 milliGRAM(s) IV Intermittent every 24 hours  docusate sodium 100 milliGRAM(s) Oral daily  enoxaparin Injectable 40 milliGRAM(s) SubCutaneous daily  hydrALAZINE 25 milliGRAM(s) Oral two times a day  hydrALAZINE      polyethylene glycol 3350 17 Gram(s) Oral daily  predniSONE   Tablet 40 milliGRAM(s) Oral daily  QUEtiapine 25 milliGRAM(s) Oral two times a day  senna 1 Tablet(s) Oral at bedtime  tiotropium 18 MICROgram(s) Capsule 1 Capsule(s) Inhalation daily    MEDICATIONS  (PRN):  clonazePAM Tablet 0.5 milliGRAM(s) Oral three times a day PRN agitation    amLODIPine 2.5 mg oral tablet: 1 tab(s) orally once a day  clonazePAM 0.25 mg oral tablet: 0.5 milligram(s) orally , As Needed  Nuedexta:  orally   pantoprazole 40 mg oral delayed release tablet: 1 tab(s) orally once a day (before a meal)  senna oral tablet: 2 tab(s) orally once a day  SEROquel 25 mg oral tablet: 1 tab(s) orally 2 times a day    ALLERGIES: Haldol (Unknown)      FAMILY HISTORY: FAMILY HISTORY:  No pertinent family history in first degree relatives      PHYSICAL EXAMINATION:  -----------------------------  T(C): 36.8 (01-20-18 @ 08:32), Max: 38.1 (01-19-18 @ 20:59)  HR: 102 (01-20-18 @ 07:00) (42 - 135)  BP: 150/126 (01-20-18 @ 07:00) (80/47 - 217/163)  RR: 30 (01-20-18 @ 07:00) (14 - 35)  SpO2: 96% (01-20-18 @ 07:00) (85% - 100%)  Wt(kg): --    01-19 @ 07:01  -  01-20 @ 07:00  --------------------------------------------------------  IN:    IV PiggyBack: 500 mL    Oral Fluid: 236 mL  Total IN: 736 mL    OUT:    Voided: 1 mL  Total OUT: 1 mL    Total NET: 735 mL            Constitutional: well developed, normal appearance, well groomed, well nourished, no deformities and no acute distress.   Eyes: the conjunctiva exhibited no abnormalities and the eyelids demonstrated no xanthelasmas.   HEENT: normal oral mucosa, no oral pallor and no oral cyanosis.   Neck: normal jugular venous A waves present, normal jugular venous V waves present and no jugular venous pichardo A waves.   Pulmonary: no respiratory distress, normal respiratory rhythm and effort, no accessory muscle use and lungs were clear to auscultation bilaterally.   Cardiovascular: heart rate and rhythm were normal, normal S1 and S2 and no murmur, gallop, rub, heave or thrill are present.   Abdomen: soft, non-tender, no hepato-splenomegaly and no abdominal mass palpated.   Musculoskeletal: the gait could not be assessed..   Extremities: no clubbing of the fingernails, no localized cyanosis, no petechial hemorrhages and no ischemic changes.   Skin: normal skin color and pigmentation, no rash, no venous stasis, no skin lesions, no skin ulcer and no xanthoma was observed.   Psychiatric: oriented to person, place, and time, the affect was normal, the mood was normal and not feeling anxious.     LABS:   --------  01-20    144  |  102  |  20  ----------------------------<  96  3.7   |  35<H>  |  0.80    Ca    8.7      20 Jan 2018 03:53  Phos  3.0     01-20  Mg     1.9     01-20    TPro  7.4  /  Alb  2.6<L>  /  TBili  0.3  /  DBili  x   /  AST  19  /  ALT  12  /  AlkPhos  82  01-20                         11.3   5.8   )-----------( 225      ( 20 Jan 2018 03:53 )             35.3               CARDIAC MARKERS ( 17 Jan 2018 14:55 )  <.015 ng/mL / x     / 44 U/L / x     / 1.5 ng/mL        RADIOLOGY:  -----------------        ECG: NSR, NSSTW changes CARDIOLOGY CONSULT NOTE    18 @ 09:00  KIN BREWSTER is a 89y Female with a known history of :  Hypercapnic respiratory failure (J96.92)    HPI:  89 yo female pt with PMH: COPD, HTN, aspiration pna, and PE admitted and found to have hypercapneic on ABG.  As per ER chart, pt was seen at home for disimpaction by her house physician and sent her to ER for abnormal lung exam-b/l crackles.  In ER, pt was lethargic with dry cough.  ABG done and revealed hypercapnia.  Pt put on bipap without any improvement in pCo2 levels.  Pt has been intubated in the past for respiratory failure.  Patient agitated at times. Noted on monitor to have tachyarrhythmias at times and occasional Wenkebach second degree heart block, otherwise hemodynamically stable.       REVIEW OF SYSTEMS: NA, patient unresponsive    MEDICATIONS  (STANDING):  ALBUTerol    90 MICROgram(s) HFA Inhaler 1 Puff(s) Inhalation every 4 hours  ALBUTerol/ipratropium for Nebulization 3 milliLiter(s) Nebulizer every 6 hours  azithromycin  IVPB      azithromycin  IVPB 500 milliGRAM(s) IV Intermittent every 24 hours  docusate sodium 100 milliGRAM(s) Oral daily  enoxaparin Injectable 40 milliGRAM(s) SubCutaneous daily  hydrALAZINE 25 milliGRAM(s) Oral two times a day  hydrALAZINE      polyethylene glycol 3350 17 Gram(s) Oral daily  predniSONE   Tablet 40 milliGRAM(s) Oral daily  QUEtiapine 25 milliGRAM(s) Oral two times a day  senna 1 Tablet(s) Oral at bedtime  tiotropium 18 MICROgram(s) Capsule 1 Capsule(s) Inhalation daily    MEDICATIONS  (PRN):  clonazePAM Tablet 0.5 milliGRAM(s) Oral three times a day PRN agitation    amLODIPine 2.5 mg oral tablet: 1 tab(s) orally once a day  clonazePAM 0.25 mg oral tablet: 0.5 milligram(s) orally , As Needed  Nuedexta:  orally   pantoprazole 40 mg oral delayed release tablet: 1 tab(s) orally once a day (before a meal)  senna oral tablet: 2 tab(s) orally once a day  SEROquel 25 mg oral tablet: 1 tab(s) orally 2 times a day    ALLERGIES: Haldol (Unknown)    FAMILY HISTORY: not available      PHYSICAL EXAMINATION:  -----------------------------  T(C): 36.8 (18 @ 08:32), Max: 38.1 (18 @ 20:59)  HR: 102 (18 @ 07:00) (42 - 135)  BP: 150/126 (18 @ 07:00) (80/47 - 217/163)  RR: 30 (18 @ 07:00) (14 - 35)  SpO2: 96% (18 @ 07:00) (85% - 100%)  Wt(kg): --     07:01  -   07:00  --------------------------------------------------------  IN:    IV PiggyBack: 500 mL    Oral Fluid: 236 mL  Total IN: 736 mL    OUT:    Voided: 1 mL  Total OUT: 1 mL    Total NET: 735 mL    Constitutional: well developed, normal appearance, well groomed, well nourished, no deformities and no acute distress.   Eyes: the conjunctiva exhibited no abnormalities and the eyelids demonstrated no xanthelasmas.   HEENT: normal oral mucosa, no oral pallor and no oral cyanosis.   Neck: normal jugular venous A waves present, normal jugular venous V waves present and no jugular venous pichardo A waves.   Pulmonary: diminished a/e=bilaterally, no significant rales or rhonchi..   Cardiovascular: heart rate and rhythm were normal, normal S1 and S2 and no murmur, gallop, rub, heave or thrill are present.   Abdomen: soft, non-tender, no hepato-splenomegaly and no abdominal mass palpated.   Musculoskeletal: the gait could not be assessed..   Extremities: no clubbing of the fingernails, no localized cyanosis, no petechial hemorrhages and no ischemic changes.   Skin: normal skin color and pigmentation, no rash, no venous stasis, no skin lesions, no skin ulcer and no xanthoma was observed.   Psychiatric: oriented to person, place, and time, the affect was normal, the mood was normal and not feeling anxious.     LABS:   --------      144  |  102  |  20  ----------------------------<  96  3.7   |  35<H>  |  0.80    Ca    8.7      2018 03:53  Phos  3.0       Mg     1.9         TPro  7.4  /  Alb  2.6<L>  /  TBili  0.3  /  DBili  x   /  AST  19  /  ALT  12  /  AlkPhos  82                           11.3   5.8   )-----------( 225      ( 2018 03:53 )             35.3               CARDIAC MARKERS ( 2018 14:55 )  <.015 ng/mL / x     / 44 U/L / x     / 1.5 ng/mL        RADIOLOGY:  -----------------  < from: Xray Chest 1 View AP/PA. (18 @ 08:02) >    EXAM:  XR CHEST AP OR PA 1V                            PROCEDURE DATE:  2018          INTERPRETATION:  CLINICAL INFORMATION:hypercapnic respiratory failure    TECHNIQUE: AP chest film. Comparison is made to 2008    FINDINGS: There is a small right pleural effusion with associated   atelectasis and/or pneumonia..  Heart size is within normal limits. Aorta   is calcified. There are multilevel degenerative changes of the thoracic   spine with mild dextroscoliosis.    IMPRESSION: Improving right lower lobe patchy opacities compared to   2018. Persistent small right pleural effusion with associated   atelectasis and/or pneumonia.    < from: TTE Echo Doppler w/o Cont (17 @ 09:59) >     EXAM:  TTE WO CON COMPLETE W DOPPL         PROCEDURE DATE:  2017        INTERPRETATION:  REPORT:    TRANSTHORACIC ECHOCARDIOGRAM REPORT           Patient Name:   KIN BREWSTER Patient Location: [facility] Rec #:  [id number]           Accession #:      [id number]  Account #:                           Height:           60.0 in 152.4 cm  YOB: 1926            Weight:           152.1 lb 69.00 kg  Patient Age:    89 years             BSA:              1.66 m²  Patient Gender: F                    BP:               138/87 mmHg        Date of Exam: 2017 9:59:47 AM  Sonographer:  GAVINO     Procedure:   2D Echo/Doppler/Color Doppler Complete.  Indications: Dyspnea, unspecified - R06.00; Acute respiratory failure,       unspecified whether with hypoxia or hypercapnia - J96.00  Diagnosis:   Nonrheumatic aortic (valve) insufficiency - I35.1           2D AND M-MODE MEASUREMENTS (normal ranges within parentheses):  Left Ventricle:                 Normal    Aorta/Left Atrium:           Normal  IVSd (2D):              1.06 cm (0.7-1.1) Aortic Root (2D):  3.39 cm   (2.4-3.7)  LVPWd (2D):             0.90 cm (0.7-1.1) Left Atrium (2D):  3.07 cm   (1.9-4.0)  LVIDd (2D):             3.10 cm (3.4-5.7) Right Ventricle:  LVIDs (2D):             2.02 cm           TAPSE:           1.91 cm  LV FS (2D):             35.0 %  (>25%)  Relative Wall Thickness 0.58    (<0.42)     LV DIASTOLIC FUNCTION:  MV Peak E: 0.55 m/s Decel Time: 268 msec  MV Peak A: 0.77 m/s  E/A Ratio:0.72     SPECTRAL DOPPLER ANALYSIS (where applicable):  Mitral Valve:  MV P1/2 Time: 77.73 msec  MV Area, PHT: 2.83 cm²     Aortic Valve: AoV Max Pedro: 1.23 m/s AoV Peak P.1 mmHg AoV Mean PG:   3.3 mmHg     LVOT Vmax: 1.19 m/s LVOT VTI: 0.257 m LVOT Diameter:     Tricuspid Valve and PA/RV Systolic Pressure: TR Max Velocity: 2.42 m/s   RA Pressure: 5 mmHg RVSP/PASP: 28.4 mmHg        PHYSICIAN INTERPRETATION:  Left Ventricle: Normal left ventricular size and wall thicknesses, with   normal systolic and diastolic function. The left ventricular internal   cavity size is normal. Left ventricular wall thickness is normal.  Global LV systolic function was normal. Left ventricular ejection   fraction, by visual estimation, is 60 to 65%. Spectral Doppler shows   normal pattern of LV diastolic filling. Normal LV filling pressures.  Right Ventricle: Normal right ventricular size and function.  Left Atrium: The left atrium is normal in size.  Right Atrium: The right atrium is normal in size. The right atrial   pressure is normal. The right atrium is normal in size.  Pericardium: There is no evidence of pericardial effusion.  Mitral Valve: The mitral valve is normal in structure. Mitral leaflet   mobility is normal. No evidence of mitral valve regurgitation is seen.  Tricuspid Valve: Structurally normal tricuspid valve, with normal leaflet   excursion. The tricuspid valve is normal in structure. Trivial tricuspid   regurgitation is visualized.  Aortic Valve: The aortic valve is normal. Sclerotic aortic valve with   normal opening. Trivial aortic valve regurgitation is seen.  Pulmonic Valve: Structurally normal pulmonic valve, with normal leaflet   excursion. No indication of pulmonic valve regurgitation.  Aorta: The aortic root is normal in size and structure.  Pulmonary Artery: The main pulmonary artery is normal in size. Pulmonary   hypertension is absent.        Summary:   1. Left ventricular ejection fraction, by visual estimation, is 60 to   65%.   2. Normal global left ventricular systolic function.   3. Normal left ventricular internal cavity size.   4. Normal left ventricular size and wall thicknesses, with normal   systolic and diastolic function.   5. Normal right ventricular size and function.   6. The left atrium is normal in size.   7. The right atrium is normal in size.   8. There is no evidence of pericardial effusion.   9. No evidence of mitral valve regurgitation.  10. Structurally normal tricuspid valve, with normal leaflet excursion.  11. Sclerotic aortic valve with normal opening.  12. Structurally normal pulmonic valve, with normal leaflet excursion.  13. Pulmonary hypertension is absent.  14. The main pulmonary artery is normal in size.     [id number] Jon Prado MD  Electronically signed on 2017 at 11:19:45 AM       ECG: NSR, NSSTW changes

## 2018-01-21 LAB
ANION GAP SERPL CALC-SCNC: 7 MMOL/L — SIGNIFICANT CHANGE UP (ref 5–17)
BASOPHILS # BLD AUTO: 0 K/UL — SIGNIFICANT CHANGE UP (ref 0–0.2)
BASOPHILS NFR BLD AUTO: 0.9 % — SIGNIFICANT CHANGE UP (ref 0–2)
BUN SERPL-MCNC: 23 MG/DL — SIGNIFICANT CHANGE UP (ref 7–23)
CALCIUM SERPL-MCNC: 8.3 MG/DL — LOW (ref 8.5–10.1)
CHLORIDE SERPL-SCNC: 100 MMOL/L — SIGNIFICANT CHANGE UP (ref 96–108)
CO2 SERPL-SCNC: 34 MMOL/L — HIGH (ref 22–31)
CREAT SERPL-MCNC: 0.78 MG/DL — SIGNIFICANT CHANGE UP (ref 0.5–1.3)
EOSINOPHIL # BLD AUTO: 0 K/UL — SIGNIFICANT CHANGE UP (ref 0–0.5)
EOSINOPHIL NFR BLD AUTO: 0.2 % — SIGNIFICANT CHANGE UP (ref 0–6)
GLUCOSE SERPL-MCNC: 69 MG/DL — LOW (ref 70–99)
HCT VFR BLD CALC: 35.1 % — SIGNIFICANT CHANGE UP (ref 34.5–45)
HGB BLD-MCNC: 11.1 G/DL — LOW (ref 11.5–15.5)
MAGNESIUM SERPL-MCNC: 1.8 MG/DL — SIGNIFICANT CHANGE UP (ref 1.6–2.6)
MCHC RBC-ENTMCNC: 29.3 PG — SIGNIFICANT CHANGE UP (ref 27–34)
MCHC RBC-ENTMCNC: 31.7 GM/DL — LOW (ref 32–36)
MCV RBC AUTO: 92.4 FL — SIGNIFICANT CHANGE UP (ref 80–100)
NEUTROPHILS # BLD AUTO: 2.8 K/UL — SIGNIFICANT CHANGE UP (ref 1.8–7.4)
NEUTROPHILS NFR BLD AUTO: 50.3 % — SIGNIFICANT CHANGE UP (ref 43–77)
PHOSPHATE SERPL-MCNC: 3.5 MG/DL — SIGNIFICANT CHANGE UP (ref 2.5–4.5)
PLATELET # BLD AUTO: 232 K/UL — SIGNIFICANT CHANGE UP (ref 150–400)
POTASSIUM SERPL-MCNC: 3.7 MMOL/L — SIGNIFICANT CHANGE UP (ref 3.5–5.3)
POTASSIUM SERPL-SCNC: 3.7 MMOL/L — SIGNIFICANT CHANGE UP (ref 3.5–5.3)
RBC # BLD: 3.8 M/UL — SIGNIFICANT CHANGE UP (ref 3.8–5.2)
RBC # FLD: 16.8 % — HIGH (ref 11–15)
SODIUM SERPL-SCNC: 141 MMOL/L — SIGNIFICANT CHANGE UP (ref 135–145)
WBC # BLD: 5.7 K/UL — SIGNIFICANT CHANGE UP (ref 3.8–10.5)
WBC # FLD AUTO: 5.7 K/UL — SIGNIFICANT CHANGE UP (ref 3.8–10.5)

## 2018-01-21 PROCEDURE — 99232 SBSQ HOSP IP/OBS MODERATE 35: CPT

## 2018-01-21 RX ORDER — IPRATROPIUM/ALBUTEROL SULFATE 18-103MCG
3 AEROSOL WITH ADAPTER (GRAM) INHALATION EVERY 6 HOURS
Qty: 0 | Refills: 0 | Status: DISCONTINUED | OUTPATIENT
Start: 2018-01-21 | End: 2018-01-30

## 2018-01-21 RX ORDER — MAGNESIUM SULFATE 500 MG/ML
1 VIAL (ML) INJECTION ONCE
Qty: 0 | Refills: 0 | Status: COMPLETED | OUTPATIENT
Start: 2018-01-21 | End: 2018-01-21

## 2018-01-21 RX ADMIN — QUETIAPINE FUMARATE 25 MILLIGRAM(S): 200 TABLET, FILM COATED ORAL at 18:20

## 2018-01-21 RX ADMIN — Medication 100 MILLIGRAM(S): at 12:42

## 2018-01-21 RX ADMIN — Medication 3 MILLILITER(S): at 05:08

## 2018-01-21 RX ADMIN — Medication 3 MILLILITER(S): at 17:04

## 2018-01-21 RX ADMIN — Medication 40 MILLIGRAM(S): at 05:35

## 2018-01-21 RX ADMIN — AZITHROMYCIN 255 MILLIGRAM(S): 500 TABLET, FILM COATED ORAL at 18:20

## 2018-01-21 RX ADMIN — POLYETHYLENE GLYCOL 3350 17 GRAM(S): 17 POWDER, FOR SOLUTION ORAL at 12:41

## 2018-01-21 RX ADMIN — CEFTRIAXONE 100 GRAM(S): 500 INJECTION, POWDER, FOR SOLUTION INTRAMUSCULAR; INTRAVENOUS at 12:40

## 2018-01-21 RX ADMIN — Medication 100 GRAM(S): at 05:35

## 2018-01-21 RX ADMIN — ENOXAPARIN SODIUM 40 MILLIGRAM(S): 100 INJECTION SUBCUTANEOUS at 12:41

## 2018-01-21 RX ADMIN — Medication 25 MILLIGRAM(S): at 18:20

## 2018-01-21 RX ADMIN — Medication 25 MILLIGRAM(S): at 05:35

## 2018-01-21 RX ADMIN — SENNA PLUS 1 TABLET(S): 8.6 TABLET ORAL at 21:15

## 2018-01-21 RX ADMIN — Medication 3 MILLILITER(S): at 11:05

## 2018-01-21 NOTE — CHART NOTE - NSCHARTNOTEFT_GEN_A_CORE
[facility] NP NOTE    HPI    89 yo female pt with PMH: COPD, HTN, aspiration pna, and PE admitted and found to have hypercapneic on ABG.  As per ER chart, pt was seen at home for disimpaction by her house physician and sent her to ER for abnormal lung exam-b/l crackles.  In ER, pt was lethargic with dry cough.  ABG done and revealed hypercapnia.  Pt put on BiPAP without any improvement in pCo2 levels.  Family was not at bedside during evaluation.  Will need to obtain more information.  Pt previously admitted for respiratory failure and was intubated.  Admitted for hypercarbic respiratory failure requiring non invasive ventilation.  now weaned off BiPAP, treat for COPD exacerbation: solumedrol and Duo Patient  empirically on azithromycin and ceftriaxone to cover possible PNA,   Patient with episodes of bradycardia 2nd degree block, no intervention  as per  Cardiology -Dr. Lima. Not candidate for aggressive interventions such as pacemaker due to dementia.  Recommendations are to avoid AVN blockers. Patient now stable for transfer to medicine service.      Report given to Dr. DENEEN Mclean, Hospitalist service    Karley Kang NP-C  critical care

## 2018-01-21 NOTE — PROGRESS NOTE ADULT - SUBJECTIVE AND OBJECTIVE BOX
89F PMH: advanced dementia, COPD, HTN and PE with prior resp failure requiring intubation, p/w PNA and acute on chronic hypercapnia requiring bipap.    No  2nd degree block today  Pt w/o hypotension.   Pt not speaking but responds to stimuli    ## ROS:  [x] unable to obtain due to mental status                          11.1   5.7   )-----------( 232      ( 21 Jan 2018 04:10 )             35.1   01-21    141  |  100  |  23  ----------------------------<  69<L>  3.7   |  34<H>  |  0.78    Ca    8.3<L>      21 Jan 2018 04:10  Phos  3.5     01-21  Mg     1.8     01-21    TPro  7.4  /  Alb  2.6<L>  /  TBili  0.3  /  DBili  x   /  AST  19  /  ALT  12  /  AlkPhos  82  01-20      MEDICATIONS  (STANDING):  ALBUTerol    90 MICROgram(s) HFA Inhaler 1 Puff(s) Inhalation every 4 hours  ALBUTerol/ipratropium for Nebulization 3 milliLiter(s) Nebulizer every 6 hours  azithromycin  IVPB      azithromycin  IVPB 500 milliGRAM(s) IV Intermittent every 24 hours  cefTRIAXone   IVPB      cefTRIAXone   IVPB 1 Gram(s) IV Intermittent every 24 hours  docusate sodium 100 milliGRAM(s) Oral daily  enoxaparin Injectable 40 milliGRAM(s) SubCutaneous daily  hydrALAZINE 25 milliGRAM(s) Oral two times a day  hydrALAZINE      polyethylene glycol 3350 17 Gram(s) Oral daily  predniSONE   Tablet 40 milliGRAM(s) Oral daily  QUEtiapine 25 milliGRAM(s) Oral two times a day  senna 1 Tablet(s) Oral at bedtime  tiotropium 18 MICROgram(s) Capsule 1 Capsule(s) Inhalation daily    ICU Vital Signs Last 24 Hrs  T(C): 37.3 (21 Jan 2018 11:11), Max: 37.3 (20 Jan 2018 19:16)  T(F): 99.2 (21 Jan 2018 11:11), Max: 99.2 (21 Jan 2018 00:00)  HR: 94 (21 Jan 2018 15:00) (39 - 115)  BP: 114/55 (21 Jan 2018 14:00) (87/70 - 166/98)  BP(mean): 73 (21 Jan 2018 14:00) (59 - 115)  ABP: --  ABP(mean): --  RR: 18 (21 Jan 2018 15:00) (13 - 31)  SpO2: 99% (21 Jan 2018 15:00) (87% - 100%)        ## P/E:  Gen: lying comfortably in bed in no apparent distress, HEENT: PERRL, EOMI  Resp: CTA B/L no c/r/w  CVS: S1S2 RRR  Abd: soft NT/ND +BS  Ext: no c/c/e  Neuro: awake, not following commands    CENTRAL LINE: [ ] YES [x] NO    A-LINE:  [ ] YES [x] NO      GLOBAL ISSUE/BEST PRACTICE:  Analgesia: n/a  Sedation: n/a  HOB elevation: yes  Stress ulcer prophylaxis: n/a  VTE prophylaxis: lovenox  Oral Care: n/a  Glycemic control: n/a  Nutrition: puree    CODE STATUS: [x] full code  [ ] DNR  [ ] DNI  [ ] MOLST  Goals of care discussion: [ ] yes - awaiting family to arrive to bedside

## 2018-01-21 NOTE — PROGRESS NOTE ADULT - SUBJECTIVE AND OBJECTIVE BOX
CARDIOLOGY CONSULT NOTE    HPI:  89 yo female pt with PMH: COPD, HTN, aspiration pna, and PE admitted and found to have hypercapneic on ABG.  As per ER chart, pt was seen at home for disimpaction by her house physician and sent her to ER for abnormal lung exam-b/l crackles.  In ER, pt was lethargic with dry cough.  ABG done and revealed hypercapnia.  Pt put on bipap without any improvement in pCo2 levels.  Pt has been intubated in the past for respiratory failure.  Patient agitated at times. Noted on monitor to have tachyarrhythmias at times and occasional Wenkebach second degree heart block, otherwise hemodynamically stable.       REVIEW OF SYSTEMS: NA, patient unresponsive    MEDICATIONS  (STANDING):  ALBUTerol    90 MICROgram(s) HFA Inhaler 1 Puff(s) Inhalation every 4 hours  ALBUTerol/ipratropium for Nebulization 3 milliLiter(s) Nebulizer every 6 hours  azithromycin  IVPB      azithromycin  IVPB 500 milliGRAM(s) IV Intermittent every 24 hours  docusate sodium 100 milliGRAM(s) Oral daily  enoxaparin Injectable 40 milliGRAM(s) SubCutaneous daily  hydrALAZINE 25 milliGRAM(s) Oral two times a day  hydrALAZINE      polyethylene glycol 3350 17 Gram(s) Oral daily  predniSONE   Tablet 40 milliGRAM(s) Oral daily  QUEtiapine 25 milliGRAM(s) Oral two times a day  senna 1 Tablet(s) Oral at bedtime  tiotropium 18 MICROgram(s) Capsule 1 Capsule(s) Inhalation daily    MEDICATIONS  (PRN):  clonazePAM Tablet 0.5 milliGRAM(s) Oral three times a day PRN agitation    amLODIPine 2.5 mg oral tablet: 1 tab(s) orally once a day  clonazePAM 0.25 mg oral tablet: 0.5 milligram(s) orally , As Needed  Nuedexta:  orally   pantoprazole 40 mg oral delayed release tablet: 1 tab(s) orally once a day (before a meal)  senna oral tablet: 2 tab(s) orally once a day  SEROquel 25 mg oral tablet: 1 tab(s) orally 2 times a day    ALLERGIES: Haldol (Unknown)    FAMILY HISTORY: not available    PHYSICAL EXAMINATION:  -----------------------------  DAVID): 36.6 (18 @ 18:50), Max: 37.7 (18 @ 15:31)  HR: 90 (18 @ 18:50) (48 - 114)  BP: 127/72 (18 @ 18:50) (87/70 - 166/98)  RR: 18 (18 @ 18:50) (13 - 23)  SpO2: 96% (18 @ 18:50) (87% - 100%)  Wt(kg): --     @ 07:  -   @ 07:00  --------------------------------------------------------  IN:    IV PiggyBack: 300 mL    Oral Fluid: 540 mL  Total IN: 840 mL    OUT:  Total OUT: 0 mL    Total NET: 840 mL       @ 07:01  -   @ 19:09  --------------------------------------------------------  IN:    Oral Fluid: 720 mL  Total IN: 720 mL    OUT:  Total OUT: 0 mL    Total NET: 720 mL            Constitutional: well developed, normal appearance, well groomed, well nourished, no deformities and no acute distress.   Eyes: the conjunctiva exhibited no abnormalities and the eyelids demonstrated no xanthelasmas.   HEENT: normal oral mucosa, no oral pallor and no oral cyanosis.   Neck: normal jugular venous A waves present, normal jugular venous V waves present and no jugular venous pichardo A waves.   Pulmonary: no respiratory distress, normal respiratory rhythm and effort, no accessory muscle use and lungs were clear to auscultation bilaterally.   Cardiovascular: heart rate and rhythm were normal, normal S1 and S2 and no murmur, gallop, rub, heave or thrill are present.   Abdomen: soft, non-tender, no hepato-splenomegaly and no abdominal mass palpated.   Musculoskeletal: the gait could not be assessed..   Extremities: no clubbing of the fingernails, no localized cyanosis, no petechial hemorrhages and no ischemic changes.   Skin: normal skin color and pigmentation, no rash, no venous stasis, no skin lesions, no skin ulcer and no xanthoma was observed.   Psychiatric: obtunded, arousable, no anxiety noted.     LABS:   --------      141  |  100  |  23  ----------------------------<  69<L>  3.7   |  34<H>  |  0.78    Ca    8.3<L>      2018 04:10  Phos  3.5       Mg     1.8         TPro  7.4  /  Alb  2.6<L>  /  TBili  0.3  /  DBili  x   /  AST  19  /  ALT  12  /  AlkPhos  82                           11.1   5.7   )-----------( 232      ( 2018 04:10 )             35.1                     RADIOLOGY:  -----------------  < from: Xray Chest 1 View AP/PA. (18 @ 08:02) >    EXAM:  XR CHEST AP OR PA 1V                            PROCEDURE DATE:  2018          INTERPRETATION:  CLINICAL INFORMATION:hypercapnic respiratory failure    TECHNIQUE: AP chest film. Comparison is made to 2008    FINDINGS: There is a small right pleural effusion with associated   atelectasis and/or pneumonia..  Heart size is within normal limits. Aorta   is calcified. There are multilevel degenerative changes of the thoracic   spine with mild dextroscoliosis.    IMPRESSION: Improving right lower lobe patchy opacities compared to   2018. Persistent small right pleural effusion with associated   atelectasis and/or pneumonia.    < from: TTE Echo Doppler w/o Cont (17 @ 09:59) >     EXAM:  TTE WO CON COMPLETE W DOPPL         PROCEDURE DATE:  2017        INTERPRETATION:  REPORT:    TRANSTHORACIC ECHOCARDIOGRAM REPORT           Patient Name:   KIN BREWSTER Patient Location: [facility] Rec #:  [id number]           Accession #:      [id number]  Account #:                           Height:           60.0 in 152.4 cm  YOB: 1926            Weight:           152.1 lb 69.00 kg  Patient Age:    89 years             BSA:              1.66 m²  Patient Gender: F                    BP:               138/87 mmHg        Date of Exam: 2017 9:59:47 AM  Sonographer:  GAVINO     Procedure:   2D Echo/Doppler/Color Doppler Complete.  Indications: Dyspnea, unspecified - R06.00; Acute respiratory failure,       unspecified whether with hypoxia or hypercapnia - J96.00  Diagnosis:   Nonrheumatic aortic (valve) insufficiency - I35.1           2D AND M-MODE MEASUREMENTS (normal ranges within parentheses):  Left Ventricle:                 Normal    Aorta/Left Atrium:           Normal  IVSd (2D):              1.06 cm (0.7-1.1) Aortic Root (2D):  3.39 cm   (2.4-3.7)  LVPWd (2D):             0.90 cm (0.7-1.1) Left Atrium (2D):  3.07 cm   (1.9-4.0)  LVIDd (2D):             3.10 cm (3.4-5.7) Right Ventricle:  LVIDs (2D):             2.02 cm           TAPSE:           1.91 cm  LV FS (2D):             35.0 %  (>25%)  Relative Wall Thickness 0.58    (<0.42)     LV DIASTOLIC FUNCTION:  MV Peak E: 0.55 m/s Decel Time: 268 msec  MV Peak A: 0.77 m/s  E/A Ratio:0.72     SPECTRAL DOPPLER ANALYSIS (where applicable):  Mitral Valve:  MV P1/2 Time: 77.73 msec  MV Area, PHT: 2.83 cm²     Aortic Valve: AoV Max Pedro: 1.23 m/s AoV Peak P.1 mmHg AoV Mean PG:   3.3 mmHg     LVOT Vmax: 1.19 m/s LVOT VTI: 0.257 m LVOT Diameter:     Tricuspid Valve and PA/RV Systolic Pressure: TR Max Velocity: 2.42 m/s   RA Pressure: 5 mmHg RVSP/PASP: 28.4 mmHg        PHYSICIAN INTERPRETATION:  Left Ventricle: Normal left ventricular size and wall thicknesses, with   normal systolic and diastolic function. The left ventricular internal   cavity size is normal. Left ventricular wall thickness is normal.  Global LV systolic function was normal. Left ventricular ejection   fraction, by visual estimation, is 60 to 65%. Spectral Doppler shows   normal pattern of LV diastolic filling. Normal LV filling pressures.  Right Ventricle: Normal right ventricular size and function.  Left Atrium: The left atrium is normal in size.  Right Atrium: The right atrium is normal in size. The right atrial   pressure is normal. The right atrium is normal in size.  Pericardium: There is no evidence of pericardial effusion.  Mitral Valve: The mitral valve is normal in structure. Mitral leaflet   mobility is normal. No evidence of mitral valve regurgitation is seen.  Tricuspid Valve: Structurally normal tricuspid valve, with normal leaflet   excursion. The tricuspid valve is normal in structure. Trivial tricuspid   regurgitation is visualized.  Aortic Valve: The aortic valve is normal. Sclerotic aortic valve with   normal opening. Trivial aortic valve regurgitation is seen.  Pulmonic Valve: Structurally normal pulmonic valve, with normal leaflet   excursion. No indication of pulmonic valve regurgitation.  Aorta: The aortic root is normal in size and structure.  Pulmonary Artery: The main pulmonary artery is normal in size. Pulmonary   hypertension is absent.        Summary:   1. Left ventricular ejection fraction, by visual estimation, is 60 to   65%.   2. Normal global left ventricular systolic function.   3. Normal left ventricular internal cavity size.   4. Normal left ventricular size and wall thicknesses, with normal   systolic and diastolic function.   5. Normal right ventricular size and function.   6. The left atrium is normal in size.   7. The right atrium is normal in size.   8. There is no evidence of pericardial effusion.   9. No evidence of mitral valve regurgitation.  10. Structurally normal tricuspid valve, with normal leaflet excursion.  11. Sclerotic aortic valve with normal opening.  12. Structurally normal pulmonic valve, with normal leaflet excursion.  13. Pulmonary hypertension is absent.  14. The main pulmonary artery is normal in size.     [id number] Jon Prado MD  Electronically signed on 2017 at 11:19:45 AM       ECG: NSR, NSSTW changes

## 2018-01-22 ENCOUNTER — TRANSCRIPTION ENCOUNTER (OUTPATIENT)
Age: 83
End: 2018-01-22

## 2018-01-22 DIAGNOSIS — R00.1 BRADYCARDIA, UNSPECIFIED: ICD-10-CM

## 2018-01-22 DIAGNOSIS — I10 ESSENTIAL (PRIMARY) HYPERTENSION: ICD-10-CM

## 2018-01-22 DIAGNOSIS — J96.02 ACUTE RESPIRATORY FAILURE WITH HYPERCAPNIA: ICD-10-CM

## 2018-01-22 LAB
ANION GAP SERPL CALC-SCNC: 7 MMOL/L — SIGNIFICANT CHANGE UP (ref 5–17)
BASOPHILS # BLD AUTO: 0.1 K/UL — SIGNIFICANT CHANGE UP (ref 0–0.2)
BASOPHILS NFR BLD AUTO: 1.2 % — SIGNIFICANT CHANGE UP (ref 0–2)
BUN SERPL-MCNC: 16 MG/DL — SIGNIFICANT CHANGE UP (ref 7–23)
CALCIUM SERPL-MCNC: 8.4 MG/DL — LOW (ref 8.5–10.1)
CHLORIDE SERPL-SCNC: 101 MMOL/L — SIGNIFICANT CHANGE UP (ref 96–108)
CO2 SERPL-SCNC: 36 MMOL/L — HIGH (ref 22–31)
CREAT SERPL-MCNC: 0.68 MG/DL — SIGNIFICANT CHANGE UP (ref 0.5–1.3)
EOSINOPHIL # BLD AUTO: 0.1 K/UL — SIGNIFICANT CHANGE UP (ref 0–0.5)
EOSINOPHIL NFR BLD AUTO: 1.3 % — SIGNIFICANT CHANGE UP (ref 0–6)
GLUCOSE SERPL-MCNC: 72 MG/DL — SIGNIFICANT CHANGE UP (ref 70–99)
HCT VFR BLD CALC: 36.8 % — SIGNIFICANT CHANGE UP (ref 34.5–45)
HGB BLD-MCNC: 11.6 G/DL — SIGNIFICANT CHANGE UP (ref 11.5–15.5)
LYMPHOCYTES # BLD AUTO: 2.2 K/UL — SIGNIFICANT CHANGE UP (ref 1–3.3)
LYMPHOCYTES # BLD AUTO: 39.8 % — SIGNIFICANT CHANGE UP (ref 13–44)
MAGNESIUM SERPL-MCNC: 2.2 MG/DL — SIGNIFICANT CHANGE UP (ref 1.6–2.6)
MCHC RBC-ENTMCNC: 29.8 PG — SIGNIFICANT CHANGE UP (ref 27–34)
MCHC RBC-ENTMCNC: 31.4 GM/DL — LOW (ref 32–36)
MCV RBC AUTO: 94.7 FL — SIGNIFICANT CHANGE UP (ref 80–100)
MONOCYTES # BLD AUTO: 0.7 K/UL — SIGNIFICANT CHANGE UP (ref 0–0.9)
MONOCYTES NFR BLD AUTO: 13 % — SIGNIFICANT CHANGE UP (ref 2–14)
NEUTROPHILS # BLD AUTO: 2.5 K/UL — SIGNIFICANT CHANGE UP (ref 1.8–7.4)
NEUTROPHILS NFR BLD AUTO: 44.8 % — SIGNIFICANT CHANGE UP (ref 43–77)
PHOSPHATE SERPL-MCNC: 3.2 MG/DL — SIGNIFICANT CHANGE UP (ref 2.5–4.5)
PLATELET # BLD AUTO: 224 K/UL — SIGNIFICANT CHANGE UP (ref 150–400)
POTASSIUM SERPL-MCNC: 3.5 MMOL/L — SIGNIFICANT CHANGE UP (ref 3.5–5.3)
POTASSIUM SERPL-SCNC: 3.5 MMOL/L — SIGNIFICANT CHANGE UP (ref 3.5–5.3)
RBC # BLD: 3.89 M/UL — SIGNIFICANT CHANGE UP (ref 3.8–5.2)
RBC # FLD: 18 % — HIGH (ref 11–15)
SODIUM SERPL-SCNC: 144 MMOL/L — SIGNIFICANT CHANGE UP (ref 135–145)
WBC # BLD: 5.6 K/UL — SIGNIFICANT CHANGE UP (ref 3.8–10.5)
WBC # FLD AUTO: 5.6 K/UL — SIGNIFICANT CHANGE UP (ref 3.8–10.5)

## 2018-01-22 PROCEDURE — 93010 ELECTROCARDIOGRAM REPORT: CPT

## 2018-01-22 PROCEDURE — 99233 SBSQ HOSP IP/OBS HIGH 50: CPT

## 2018-01-22 RX ORDER — BUDESONIDE, MICRONIZED 100 %
0.5 POWDER (GRAM) MISCELLANEOUS
Qty: 0 | Refills: 0 | Status: DISCONTINUED | OUTPATIENT
Start: 2018-01-22 | End: 2018-01-30

## 2018-01-22 RX ORDER — DOCUSATE SODIUM 100 MG
100 CAPSULE ORAL
Qty: 0 | Refills: 0 | Status: DISCONTINUED | OUTPATIENT
Start: 2018-01-22 | End: 2018-01-30

## 2018-01-22 RX ADMIN — Medication 3 MILLILITER(S): at 17:38

## 2018-01-22 RX ADMIN — Medication 3 MILLILITER(S): at 11:56

## 2018-01-22 RX ADMIN — Medication 3 MILLILITER(S): at 00:57

## 2018-01-22 RX ADMIN — Medication 3 MILLILITER(S): at 23:47

## 2018-01-22 RX ADMIN — Medication 40 MILLIGRAM(S): at 06:18

## 2018-01-22 RX ADMIN — ENOXAPARIN SODIUM 40 MILLIGRAM(S): 100 INJECTION SUBCUTANEOUS at 11:30

## 2018-01-22 RX ADMIN — AZITHROMYCIN 255 MILLIGRAM(S): 500 TABLET, FILM COATED ORAL at 17:37

## 2018-01-22 RX ADMIN — POLYETHYLENE GLYCOL 3350 17 GRAM(S): 17 POWDER, FOR SOLUTION ORAL at 11:41

## 2018-01-22 RX ADMIN — Medication 3 MILLILITER(S): at 05:40

## 2018-01-22 RX ADMIN — Medication 25 MILLIGRAM(S): at 06:18

## 2018-01-22 RX ADMIN — SENNA PLUS 1 TABLET(S): 8.6 TABLET ORAL at 21:58

## 2018-01-22 RX ADMIN — CEFTRIAXONE 100 GRAM(S): 500 INJECTION, POWDER, FOR SOLUTION INTRAMUSCULAR; INTRAVENOUS at 11:30

## 2018-01-22 RX ADMIN — QUETIAPINE FUMARATE 25 MILLIGRAM(S): 200 TABLET, FILM COATED ORAL at 17:23

## 2018-01-22 RX ADMIN — QUETIAPINE FUMARATE 25 MILLIGRAM(S): 200 TABLET, FILM COATED ORAL at 06:18

## 2018-01-22 NOTE — DISCHARGE NOTE ADULT - MEDICATION SUMMARY - MEDICATIONS TO TAKE
I will START or STAY ON the medications listed below when I get home from the hospital:    Thick-It  -- Use as directed  -- Indication: For Diet    budesonide 0.5 mg/2 mL inhalation suspension  -- 2 milliliter(s) inhaled 2 times a day   -- Indication: For SOB    Coumadin 2 mg oral tablet  -- 1 tab(s) by mouth once a day   -- Do not take this drug if you are pregnant.  It is very important that you take or use this exactly as directed.  Do not skip doses or discontinue unless directed by your doctor.  Obtain medical advice before taking any non-prescription drugs as some may affect the action of this medication.    -- Indication: For R LE DVT    clonazePAM 0.25 mg oral tablet  -- 0.5 milligram(s) by mouth once a day, As Needed MDD:1  -- Indication: For Anxiety    QUEtiapine 25 mg oral tablet  -- 1 tab(s) by mouth 2 times a day  -- Indication: For Antipsychotic    albuterol 90 mcg/inh inhalation aerosol  -- 1 puff(s) inhaled every 4 hours PRN SOB  -- Indication: For SOB    tiotropium 18 mcg inhalation capsule  -- 1 cap(s) inhaled once a day  -- Indication: For SOB    docusate sodium 10 mg/mL oral liquid  -- 10 milliliter(s) by mouth 2 times a day  -- Indication: For Constipation    polyethylene glycol 3350 oral powder for reconstitution  -- 17 gram(s) by mouth once a day PRN constipation  -- Indication: For Constipation    senna oral tablet  -- 1 tab(s) by mouth once a day (at bedtime)  -- Indication: For Constipation    lactulose 10 g/15 mL oral syrup  -- 15 milliliter(s) by mouth 2 times a day PRN constipation  -- Indication: For Constipation    hydrALAZINE 25 mg oral tablet  -- 1 tab(s) by mouth 2 times a day   -- It is very important that you take or use this exactly as directed.  Do not skip doses or discontinue unless directed by your doctor.  Some non-prescription drugs may aggravate your condition.  Read all labels carefully.  If a warning appears, check with your doctor before taking.    -- Indication: For HTN

## 2018-01-22 NOTE — DISCHARGE NOTE ADULT - PROVIDER TOKENS
FREE:[LAST:[Nata HICKS],PHONE:[(   )    -],FAX:[(   )    -],ADDRESS:[PMD]],TOKEN:'[id number]' TOKEN:'[id number]',FREE:[LAST:[Nata HICKS],PHONE:[(   )    -],FAX:[(   )    -],ADDRESS:[PMD]],TOKEN:'[id number]' TOKEN:'[id number]:'[id number]',FREE:[LAST:[Dr. Herrera],FIRST:[Mata],PHONE:[phone]],FAX:[(   )    -],ADDRESS:[[facility]]]

## 2018-01-22 NOTE — PROGRESS NOTE ADULT - SUBJECTIVE AND OBJECTIVE BOX
Patient is a 89y old  Female who presents with a chief complaint of shortness of breath. (22 Jan 2018 11:56)       OVERNIGHT EVENTS:    MEDICATIONS  (STANDING):  ALBUTerol    90 MICROgram(s) HFA Inhaler 1 Puff(s) Inhalation every 4 hours  ALBUTerol/ipratropium for Nebulization 3 milliLiter(s) Nebulizer every 6 hours  azithromycin  IVPB      azithromycin  IVPB 500 milliGRAM(s) IV Intermittent every 24 hours  docusate sodium 100 milliGRAM(s) Oral daily  enoxaparin Injectable 40 milliGRAM(s) SubCutaneous daily  hydrALAZINE 25 milliGRAM(s) Oral two times a day  hydrALAZINE      polyethylene glycol 3350 17 Gram(s) Oral daily  predniSONE   Tablet 40 milliGRAM(s) Oral daily  QUEtiapine 25 milliGRAM(s) Oral two times a day  senna 1 Tablet(s) Oral at bedtime  tiotropium 18 MICROgram(s) Capsule 1 Capsule(s) Inhalation daily    MEDICATIONS  (PRN):  clonazePAM Tablet 0.5 milliGRAM(s) Oral three times a day PRN agitation       Vital Signs Last 24 Hrs  T(C): 37.1 (22 Jan 2018 05:32), Max: 37.7 (21 Jan 2018 15:31)  T(F): 98.8 (22 Jan 2018 05:32), Max: 99.8 (21 Jan 2018 15:31)  HR: 81 (22 Jan 2018 12:39) (66 - 94)  BP: 108/69 (22 Jan 2018 05:32) (108/69 - 145/76)  BP(mean): 91 (21 Jan 2018 18:00) (73 - 94)  RR: 18 (22 Jan 2018 05:32) (17 - 22)  SpO2: 97% (22 Jan 2018 12:39) (95% - 100%)    PHYSICAL EXAM:  GENERAL: NAD, well-groomed, lying in bed  HEAD:  Atraumatic, Normocephalic  EYES: EOMI, PERRLA, conjunctiva and sclera clear  ENMT: No tonsillar erythema, exudates, or enlargement; Moist mucous membranes   NECK: Supple, No JVD   NERVOUS SYSTEM:  awake, does not follow commands  CHEST/LUNG: Clear to auscultation  bilaterally; No rales, rhonchi, wheezing, or rubs  HEART: Regular rate and rhythm; No murmurs appreciated   ABDOMEN: Soft, Nontender, Nondistended; Bowel sounds present  EXTREMITIES:  2+ Peripheral Pulses, No clubbing, cyanosis, or edema  LYMPH: No lymphadenopathy noted      LABS:                        11.6   5.6   )-----------( 224      ( 22 Jan 2018 06:30 )             36.8     01-22    144  |  101  |  16  ----------------------------<  72  3.5   |  36<H>  |  0.68    Ca    8.4<L>      22 Jan 2018 06:30  Phos  3.2     01-22  Mg     2.2     01-22         cardiac markers     CAPILLARY BLOOD GLUCOSE        Cultures    RADIOLOGY & ADDITIONAL TESTS:    Imaging Personally Reviewed:  [x ] YES  [ ] NO    Consultant(s) Notes Reviewed:  [x ] YES  [ ] NO    Care Discussed with Consultants/Other Providers [x ] YES  [ ] NO

## 2018-01-22 NOTE — PROGRESS NOTE ADULT - PROBLEM SELECTOR PLAN 3
-Patient with episodes of bradycardia 2nd degree block in ICU , no intervention  as per  Cardiology -Dr. Lima, Not candidate for aggressive interventions such as pacemaker due to dementia. , avoid AVN blockers  -monitor

## 2018-01-22 NOTE — DISCHARGE NOTE ADULT - SECONDARY DIAGNOSIS.
UTI (urinary tract infection) Benign essential hypertension Alzheimer's dementia with behavioral disturbance Acute deep vein thrombosis (DVT) of femoral vein of right lower extremity

## 2018-01-22 NOTE — DISCHARGE NOTE ADULT - CARE PLAN
Principal Discharge DX:	Hypercapnic respiratory failure  Goal:	Stable  Assessment and plan of treatment:	Continue ABX until completed  Follow up with PMD  Secondary Diagnosis:	UTI (urinary tract infection)  Assessment and plan of treatment:	Continue ABX until completed  Follow up with PMD  Secondary Diagnosis:	Benign essential hypertension  Assessment and plan of treatment:	Continue home blood pressure medications  Follow up with PCP  Low-sodium diet  AHA Recipes - [email or web address]  Secondary Diagnosis:	Alzheimer's dementia with behavioral disturbance  Assessment and plan of treatment:	Continue home meds Principal Discharge DX:	Hypercapnic respiratory failure  Goal:	Stable  Assessment and plan of treatment:	Continue ABX until completed  Follow up with PMD  Secondary Diagnosis:	UTI (urinary tract infection)  Assessment and plan of treatment:	Continue ABX until completed  Follow up with PMD  Secondary Diagnosis:	Benign essential hypertension  Assessment and plan of treatment:	Continue home blood pressure medications  Follow up with PCP  Low-sodium diet  AHA Recipes - [email or web address]  Secondary Diagnosis:	Alzheimer's dementia with behavioral disturbance  Assessment and plan of treatment:	Continue home meds  Secondary Diagnosis:	Acute deep vein thrombosis (DVT) of femoral vein of right lower extremity  Assessment and plan of treatment:	Continue Coumadin  Follow up with hematologist  Monitor INR Principal Discharge DX:	Hypercapnic respiratory failure  Goal:	Stable  Assessment and plan of treatment:	Continue ABX until completed  Follow up with PMD  Secondary Diagnosis:	UTI (urinary tract infection)  Assessment and plan of treatment:	Continue ABX until completed  Follow up with PMD  Secondary Diagnosis:	Benign essential hypertension  Assessment and plan of treatment:	Continue home blood pressure medications  Follow up with PCP  Low-sodium diet  AHA Recipes - [email or web address]  Secondary Diagnosis:	Alzheimer's dementia with behavioral disturbance  Assessment and plan of treatment:	Continue home meds  Secondary Diagnosis:	Acute deep vein thrombosis (DVT) of femoral vein of right lower extremity  Assessment and plan of treatment:	Continue Coumadin  Follow up with hematologist  Repeat INR with Nata HICKS within 2 days of discharge Principal Discharge DX:	Hypercapnic respiratory failure  Goal:	Stable  Assessment and plan of treatment:	Continue ABX until completed  Follow up with PMD  Secondary Diagnosis:	UTI (urinary tract infection)  Assessment and plan of treatment:	Continue ABX until completed  Follow up with PMD  Secondary Diagnosis:	Benign essential hypertension  Assessment and plan of treatment:	Continue home blood pressure medications  Follow up with PCP  Low-sodium diet  AHA Recipes - [email or web address]  Secondary Diagnosis:	Alzheimer's dementia with behavioral disturbance  Assessment and plan of treatment:	Continue home meds  Secondary Diagnosis:	Acute deep vein thrombosis (DVT) of femoral vein of right lower extremity  Assessment and plan of treatment:	Continue Coumadin  Follow up with hematologist  Repeat INR with Nata HICKS within 2 days of discharge  Appointment w/ Dr. Herrera on 1/31

## 2018-01-22 NOTE — CONSULT NOTE ADULT - SUBJECTIVE AND OBJECTIVE BOX
NEY VIGIL S [id number] 9/13/1926  ALLERGY Haldol  CONTACT Jessica Srikanth Camacho [phone] self    REASON FOR VISIT   Initial evaluation/Pulmonary consultation requested  1/22/2018 from Dr Lobo by Dr Lundberg   Patient examined chart reviewed  HOSPITAL ADMISSION [facility] Dr Aleshia Mustafa 1/17/2018   PATIENT CAME  FROM (if information available)    VITALS/LABS  1/22/2018 afeb 92 100/55 17 96% 59   1/22/2018 W 5.6 Hb 11.6 Plt 224 Na 144 K 3.5 CO2 36 Cr .6   REVIEW OF SYMPTOMS    Able to give ROS  NO   PHYSICAL EXAM    HEENT Unremarkable PERRLA atraumatic   RESP Fair air entry EXP prolonged    Harsh breath sound Resp distres mild   CARDIAC S1 S2 No S3     NO JVD    ABDOMEN SOFT BS PRESENT NOT DISTENDED No hepatosplenomegaly PEDAL EDEMA present No calf tenderness  NO rash   GENERAL Not TOXIC looking  PATIENT DESCRIPTION CC HPI PMH FAM SOCIAL 1/17/2018 ADMISSION [facility]  89 f Past Medical History: Alzheimer's Dementia with Behavioral Disturbance   Benign Essential Hypertension   Ovarian cancer  PE (pulmonary thromboembolism)   UTI (urinary tract infection). was sent from home by [facility] physician who had come to disimpact her for resp distress and was admitted [facility] 1/17/2018 to ICU in ac hypercapnic resp failure requiring BPAP   Pt transferred out of ICU an PUlm consulted 1/22/2018                                                                    HOSPITAL COURSE PROBLEM ASSESSMENT PLAN 1/17/2018 ADMISSION [facility]                                   ACUTE HYPERCAPNIC RESP FAILURE  1/17/2018 bpap 18/6/.4 729/90/65   1/19/2018 2l 752/44/50   RO VTE  1/22 Check V duplex legs   INFECTION  PNEUMONIA   1/18/2018 CXr Improving rll patchy opac cw 1/17 Persistent small r pl effsn   1/18 bc n 1/18 rvp n 1/18 flu ab n   1/22 W 5.6   1/22 Speech eval   Azithro (1/17)   COPD EX  Duoneb.4 (1/22) Pred 40 (1/19) albuterol 1p.6 (1/17) spiriva (1/17)   1/22 added pulmicort (1/22)   CHF   Hydralazine 25.2 (1/19)   7/5/2017 ECHO EF 60 no ph   AGITATION   Quetiapine 25.2 (1/19)   GLOBAL ISSUE/BEST PRACTICE:        PROBLEM: Analgesia:     na                        PROBLEM: Sedation:     na               PROBLEM: HOB elevation:   y             PROBLEM: Stress ulcer proph:    na                      PROBLEM: VTE prophylaxis:      lvnx 40 (1/18)                   PROBLEM: Glycemic control:    na   PROBLEM: Nutrition:    Dys 1 puree honey lo Na (1/18)           PROBLEM: Advanced directive: na     PROBLEM: Allergies:  na      TIME SPENT Over 55 minutes aggregate care time spent on encounter; activities included   direct patient care, counseling and/or coordinating care reviewing notes, lab data/ imaging , discussion with multidisciplinary team/ patient  /family. Risks, benefits, alternatives  discussed in detail. Proper antibiotic use issues addressed Questions/concerns  were addressed  as  best as possible

## 2018-01-22 NOTE — DISCHARGE NOTE ADULT - HOSPITAL COURSE
89F PMH: advanced dementia, COPD, HTN and PE with prior resp failure requiring intubation, p/w PNA and acute on chronic hypercapnia requiring bipap.    Pt stable for discharge home w/ PMD follow up.    1. PULM  - acute on chronic hypercarbic respiratory failure: weaned off bipap  - cont nocturnal bipap and bipap prn  - cont PO prednisone  - complete course of abx  - hx of PE: pt not currently on anticoagulation -readdress risks and benefits    2. CV  cont hydralazine for better BP control as diastolic pressure high  episodes of bradycardia 2nd degree block  no intervention   Cardiology reccs appreciated  avoid any rate control meds 89F PMH: advanced dementia, COPD, HTN and PE with prior resp failure requiring intubation, p/w PNA and acute on chronic hypercapnia requiring bipap.    Pt stable for discharge home w/ PMD follow up. Pt scheduled for house call follow up 1/31.    1. PULM  - acute on chronic hypercarbic respiratory failure: weaned off bipap  - cont nocturnal bipap and bipap prn  - cont PO prednisone  - complete course of abx  - hx of PE: pt not currently on anticoagulation -readdress risks and benefits    2. CV  cont hydralazine for better BP control as diastolic pressure high  episodes of bradycardia 2nd degree block  no intervention   Cardiology reccs appreciated  avoid any rate control meds

## 2018-01-22 NOTE — DISCHARGE NOTE ADULT - PATIENT PORTAL LINK FT
“You can access the [facility] Patient Portal, offered by [facility], by registering with the following website: [email or web address]”

## 2018-01-22 NOTE — PROGRESS NOTE ADULT - PROBLEM SELECTOR PLAN 1
- c/w BIPAP QHS and PRN  - Pulmonary  consulted  - on prednisone for 5 days and zithromax, will d/c zithro in AM (7 day course)  - c/w luann

## 2018-01-22 NOTE — DISCHARGE NOTE ADULT - PLAN OF CARE
Stable Continue ABX until completed  Follow up with PMD Continue home blood pressure medications  Follow up with PCP  Low-sodium diet  AHA Recipes - [email or web address] Continue home meds Continue Coumadin  Follow up with hematologist  Monitor INR Continue Coumadin  Follow up with hematologist  Repeat INR with Nata HICKS within 2 days of discharge Continue Coumadin  Follow up with hematologist  Repeat INR with Nata HICKS within 2 days of discharge  Appointment w/ Dr. Herrera on 1/31

## 2018-01-22 NOTE — CONSULT NOTE ADULT - ASSESSMENT
NEY VIGIL S [id number] 9/13/1926  ALLERGY Haldol  CONTACT Jessica Srikanth Camacho [phone] self    REASON FOR VISIT   Initial evaluation/Pulmonary consultation requested  1/22/2018 from Dr Lobo by Dr Lundberg   Patient examined chart reviewed  HOSPITAL ADMISSION LVS Dr Aleshia Mustafa 1/17/2018   PATIENT CAME  FROM (if information available)    VITALS/LABS  1/22/2018 afeb 92 100/55 17 96% 59   1/22/2018 W 5.6 Hb 11.6 Plt 224 Na 144 K 3.5 CO2 36 Cr .6   REVIEW OF SYMPTOMS    Able to give ROS  NO   PHYSICAL EXAM    HEENT Unremarkable PERRLA atraumatic   RESP Fair air entry EXP prolonged    Harsh breath sound Resp distres mild   CARDIAC S1 S2 No S3     NO JVD    ABDOMEN SOFT BS PRESENT NOT DISTENDED No hepatosplenomegaly PEDAL EDEMA present No calf tenderness  NO rash   GENERAL Not TOXIC looking  PATIENT DESCRIPTION CC HPI PMH FAM SOCIAL 1/17/2018 ADMISSION LVS  89 f Past Medical History: Alzheimer's Dementia with Behavioral Disturbance   Benign Essential Hypertension   Ovarian cancer  PE (pulmonary thromboembolism)   UTI (urinary tract infection). was sent from home by [facility] physician who had come to disimpact her for resp distress and was admitted [facility] 1/17/2018 to ICU in ac hypercapnic resp failure requiring BPAP   Pt transferred out of ICU an PUlm consulted 1/22/2018                                                                    HOSPITAL COURSE PROBLEM ASSESSMENT PLAN 1/17/2018 ADMISSION LVS                                   ACUTE HYPERCAPNIC RESP FAILURE  1/17/2018 bpap 18/6/.4 729/90/65   1/19/2018 2l 752/44/50   RO VTE  1/22 Check V duplex legs   INFECTION  PNEUMONIA   1/18/2018 CXr Improving rll patchy opac cw 1/17 Persistent small r pl effsn   1/18 bc n 1/18 rvp n 1/18 flu ab n   1/22 W 5.6   1/22 Speech eval   Azithro (1/17)   COPD EX  Duoneb.4 (1/22) Pred 40 (1/19) albuterol 1p.6 (1/17) spiriva (1/17)   1/22 added pulmicort (1/22)   CHF   Hydralazine 25.2 (1/19)   7/5/2017 ECHO EF 60 no ph   AGITATION   Quetiapine 25.2 (1/19)   GLOBAL ISSUE/BEST PRACTICE:        PROBLEM: Analgesia:     na                        PROBLEM: Sedation:     na               PROBLEM: HOB elevation:   y             PROBLEM: Stress ulcer proph:    na                      PROBLEM: VTE prophylaxis:      lvnx 40 (1/18)                   PROBLEM: Glycemic control:    na   PROBLEM: Nutrition:    Dys 1 puree honey lo Na (1/18)           PROBLEM: Advanced directive: na     PROBLEM: Allergies:  na      TIME SPENT Over 55 minutes aggregate care time spent on encounter; activities included   direct patient care, counseling and/or coordinating care reviewing notes, lab data/ imaging , discussion with multidisciplinary team/ patient  /family. Risks, benefits, alternatives  discussed in detail. Proper antibiotic use issues addressed Questions/concerns  were addressed  as  best as possible
89 yo lady with a pmhx of dementia, alzheimers, chronic COPD, acute respiratory failure HTN, h/o AVR, prior hx of aspiration pna and PE who presents to the ED with AMS and sob.  Currently still having periods of respiratory distress and agitation.  Periods of second degree heart block previously noted on prior admissions.  Avoid AVN blockers.  Supportive measures.  Not candidate for aggressive interventions such as pacemaker due to dementia.  Will follow only as needed.

## 2018-01-22 NOTE — DISCHARGE NOTE ADULT - CARE PROVIDER_API CALL
Nata HICKS,   PMD  Phone: (   )    -  Fax: (   )    -    Rosendo Lima (MD), Cardiology; Cardiovascular Disease; Nuclear Cardiology  [address] [place] [address]  Phone: [phone]  Fax: [phone] Rosendo Lima), Cardiology; Cardiovascular Disease; Nuclear Cardiology  [address] [place] [address]  Phone: [phone]  Fax: [phone]    Nata HICKS,   PMD  Phone: (   )    -  Fax: (   )    -    Tin Lobo), Critical Care Medicine; Internal Medicine; Pulmonary Disease  [address] [place] [address]  Phone: [phone]  Fax: [phone] Rosendo Lima), Cardiology; Cardiovascular Disease; Nuclear Cardiology  [address] [place] [address]  Phone: [phone]  Fax: [phone]    Tin Lobo), Critical Care Medicine; Internal Medicine; Pulmonary Disease  [address] [place] [address]  Phone: [phone]  Fax: [phone]    Dr. Herrera, [facility]  Phone: [phone]  Fax: (

## 2018-01-22 NOTE — DISCHARGE NOTE ADULT - CARE PROVIDERS DIRECT ADDRESSES
,DirectAddress_Unknown,[email or web address] ,[email or web address],[email or web address],[email or web address],DirectAddress_Unknown

## 2018-01-23 DIAGNOSIS — I82.411 ACUTE EMBOLISM AND THROMBOSIS OF RIGHT FEMORAL VEIN: ICD-10-CM

## 2018-01-23 LAB
ALBUMIN SERPL ELPH-MCNC: 2.8 G/DL — LOW (ref 3.3–5)
ALP SERPL-CCNC: 68 U/L — SIGNIFICANT CHANGE UP (ref 40–120)
ALT FLD-CCNC: 17 U/L — SIGNIFICANT CHANGE UP (ref 12–78)
ANION GAP SERPL CALC-SCNC: 8 MMOL/L — SIGNIFICANT CHANGE UP (ref 5–17)
AST SERPL-CCNC: 13 U/L — LOW (ref 15–37)
BASE EXCESS BLDV CALC-SCNC: 12.2 MMOL/L — HIGH (ref -2–2)
BILIRUB SERPL-MCNC: 0.4 MG/DL — SIGNIFICANT CHANGE UP (ref 0.2–1.2)
BLOOD GAS COMMENTS, VENOUS: SIGNIFICANT CHANGE UP
BUN SERPL-MCNC: 18 MG/DL — SIGNIFICANT CHANGE UP (ref 7–23)
CALCIUM SERPL-MCNC: 8.4 MG/DL — LOW (ref 8.5–10.1)
CHLORIDE SERPL-SCNC: 101 MMOL/L — SIGNIFICANT CHANGE UP (ref 96–108)
CO2 SERPL-SCNC: 35 MMOL/L — HIGH (ref 22–31)
CREAT SERPL-MCNC: 0.62 MG/DL — SIGNIFICANT CHANGE UP (ref 0.5–1.3)
CULTURE RESULTS: SIGNIFICANT CHANGE UP
CULTURE RESULTS: SIGNIFICANT CHANGE UP
GAS PNL BLDV: SIGNIFICANT CHANGE UP
GLUCOSE SERPL-MCNC: 82 MG/DL — SIGNIFICANT CHANGE UP (ref 70–99)
HCO3 BLDV-SCNC: 38 MMOL/L — HIGH (ref 21–29)
HCT VFR BLD CALC: 34.3 % — LOW (ref 34.5–45)
HGB BLD-MCNC: 10.7 G/DL — LOW (ref 11.5–15.5)
HOROWITZ INDEX BLDV+IHG-RTO: 21 — SIGNIFICANT CHANGE UP
INR BLD: 1.02 RATIO — SIGNIFICANT CHANGE UP (ref 0.88–1.16)
MAGNESIUM SERPL-MCNC: 2.2 MG/DL — SIGNIFICANT CHANGE UP (ref 1.6–2.6)
MCHC RBC-ENTMCNC: 29.3 PG — SIGNIFICANT CHANGE UP (ref 27–34)
MCHC RBC-ENTMCNC: 31.2 GM/DL — LOW (ref 32–36)
MCV RBC AUTO: 94 FL — SIGNIFICANT CHANGE UP (ref 80–100)
PCO2 BLDV: 56 MMHG — HIGH (ref 35–50)
PH BLDV: 7.45 — SIGNIFICANT CHANGE UP (ref 7.35–7.45)
PHOSPHATE SERPL-MCNC: 3.2 MG/DL — SIGNIFICANT CHANGE UP (ref 2.5–4.5)
PLATELET # BLD AUTO: 232 K/UL — SIGNIFICANT CHANGE UP (ref 150–400)
PO2 BLDV: 58 MMHG — HIGH (ref 25–45)
POTASSIUM SERPL-MCNC: 3.2 MMOL/L — LOW (ref 3.5–5.3)
POTASSIUM SERPL-SCNC: 3.2 MMOL/L — LOW (ref 3.5–5.3)
PROCALCITONIN SERPL-MCNC: <0.05 NG/ML — SIGNIFICANT CHANGE UP (ref 0–0.04)
PROT SERPL-MCNC: 6.9 GM/DL — SIGNIFICANT CHANGE UP (ref 6–8.3)
PROTHROM AB SERPL-ACNC: 11.1 SEC — SIGNIFICANT CHANGE UP (ref 9.8–12.7)
RBC # BLD: 3.64 M/UL — LOW (ref 3.8–5.2)
RBC # FLD: 17.5 % — HIGH (ref 11–15)
SAO2 % BLDV: 88 % — SIGNIFICANT CHANGE UP (ref 67–88)
SODIUM SERPL-SCNC: 144 MMOL/L — SIGNIFICANT CHANGE UP (ref 135–145)
SPECIMEN SOURCE: SIGNIFICANT CHANGE UP
SPECIMEN SOURCE: SIGNIFICANT CHANGE UP
WBC # BLD: 7.2 K/UL — SIGNIFICANT CHANGE UP (ref 3.8–10.5)
WBC # FLD AUTO: 7.2 K/UL — SIGNIFICANT CHANGE UP (ref 3.8–10.5)

## 2018-01-23 PROCEDURE — 99233 SBSQ HOSP IP/OBS HIGH 50: CPT

## 2018-01-23 PROCEDURE — 93970 EXTREMITY STUDY: CPT | Mod: 26

## 2018-01-23 RX ORDER — ENOXAPARIN SODIUM 100 MG/ML
60 INJECTION SUBCUTANEOUS
Qty: 0 | Refills: 0 | Status: DISCONTINUED | OUTPATIENT
Start: 2018-01-23 | End: 2018-01-27

## 2018-01-23 RX ORDER — WARFARIN SODIUM 2.5 MG/1
5 TABLET ORAL ONCE
Qty: 0 | Refills: 0 | Status: COMPLETED | OUTPATIENT
Start: 2018-01-23 | End: 2018-01-23

## 2018-01-23 RX ORDER — ACETAMINOPHEN 500 MG
650 TABLET ORAL EVERY 6 HOURS
Qty: 0 | Refills: 0 | Status: DISCONTINUED | OUTPATIENT
Start: 2018-01-23 | End: 2018-01-30

## 2018-01-23 RX ORDER — FONDAPARINUX SODIUM 2.5 MG/.5ML
1 INJECTION, SOLUTION SUBCUTANEOUS
Qty: 42 | Refills: 0 | OUTPATIENT
Start: 2018-01-23 | End: 2018-02-12

## 2018-01-23 RX ADMIN — Medication 40 MILLIGRAM(S): at 05:28

## 2018-01-23 RX ADMIN — ENOXAPARIN SODIUM 60 MILLIGRAM(S): 100 INJECTION SUBCUTANEOUS at 21:31

## 2018-01-23 RX ADMIN — Medication 650 MILLIGRAM(S): at 11:54

## 2018-01-23 RX ADMIN — Medication 25 MILLIGRAM(S): at 05:28

## 2018-01-23 RX ADMIN — WARFARIN SODIUM 5 MILLIGRAM(S): 2.5 TABLET ORAL at 21:31

## 2018-01-23 RX ADMIN — Medication 3 MILLILITER(S): at 05:57

## 2018-01-23 RX ADMIN — Medication 25 MILLIGRAM(S): at 17:30

## 2018-01-23 RX ADMIN — ENOXAPARIN SODIUM 60 MILLIGRAM(S): 100 INJECTION SUBCUTANEOUS at 11:53

## 2018-01-23 RX ADMIN — Medication 0.5 MILLIGRAM(S): at 17:19

## 2018-01-23 RX ADMIN — QUETIAPINE FUMARATE 25 MILLIGRAM(S): 200 TABLET, FILM COATED ORAL at 17:30

## 2018-01-23 RX ADMIN — Medication 0.5 MILLIGRAM(S): at 05:57

## 2018-01-23 RX ADMIN — Medication 3 MILLILITER(S): at 11:13

## 2018-01-23 RX ADMIN — POLYETHYLENE GLYCOL 3350 17 GRAM(S): 17 POWDER, FOR SOLUTION ORAL at 11:11

## 2018-01-23 RX ADMIN — Medication 100 MILLIGRAM(S): at 17:30

## 2018-01-23 RX ADMIN — Medication 100 MILLIGRAM(S): at 05:35

## 2018-01-23 RX ADMIN — Medication 3 MILLILITER(S): at 17:17

## 2018-01-23 RX ADMIN — QUETIAPINE FUMARATE 25 MILLIGRAM(S): 200 TABLET, FILM COATED ORAL at 05:28

## 2018-01-23 RX ADMIN — SENNA PLUS 1 TABLET(S): 8.6 TABLET ORAL at 21:31

## 2018-01-23 NOTE — SWALLOW BEDSIDE ASSESSMENT ADULT - SLP GENERAL OBSERVATIONS
pt seen bedside, alert and accepted po trials. pt nonverbal, noncommunicative but produced repetitive CV utterances/phonation. noted fleeting eye contact and she did not follow directions/models.

## 2018-01-23 NOTE — SWALLOW BEDSIDE ASSESSMENT ADULT - SWALLOW EVAL: DIAGNOSIS
pt presented with reduced cognition which may be negatively impacting swallow function and limits eval, however noted decreased labial seal to the spoon, slow oral transport and/or slight delay in swallow trigger. ?throat clear with thin liquids although pt with repetitive phonation

## 2018-01-23 NOTE — SWALLOW BEDSIDE ASSESSMENT ADULT - COMMENTS
CXR 1/18/2018IMPRESSION: Improving right lower lobe patchy opacities compared to 1/17/2018. Persistent small right pleural effusion with associated atelectasis and/or pneumonia.

## 2018-01-23 NOTE — PROGRESS NOTE ADULT - SUBJECTIVE AND OBJECTIVE BOX
Patient is a 89y old  Female who presents with a chief complaint of shortness of breath. (22 Jan 2018 11:56)       OVERNIGHT EVENTS:    MEDICATIONS  (STANDING):  ALBUTerol    90 MICROgram(s) HFA Inhaler 1 Puff(s) Inhalation every 4 hours  ALBUTerol/ipratropium for Nebulization 3 milliLiter(s) Nebulizer every 6 hours  azithromycin  IVPB      azithromycin  IVPB 500 milliGRAM(s) IV Intermittent every 24 hours  buDESOnide   0.5 milliGRAM(s) Respule 0.5 milliGRAM(s) Inhalation two times a day  docusate sodium Liquid 100 milliGRAM(s) Oral two times a day  enoxaparin Injectable 60 milliGRAM(s) SubCutaneous <User Schedule>  hydrALAZINE 25 milliGRAM(s) Oral two times a day  hydrALAZINE      polyethylene glycol 3350 17 Gram(s) Oral daily  predniSONE   Tablet 40 milliGRAM(s) Oral daily  QUEtiapine 25 milliGRAM(s) Oral two times a day  senna 1 Tablet(s) Oral at bedtime  tiotropium 18 MICROgram(s) Capsule 1 Capsule(s) Inhalation daily    MEDICATIONS  (PRN):  acetaminophen   Tablet 650 milliGRAM(s) Oral every 6 hours PRN For Temp greater than 38 C (100.4 F)  clonazePAM Tablet 0.5 milliGRAM(s) Oral three times a day PRN agitation    Vital Signs Last 24 Hrs  T(C): 37.4 (23 Jan 2018 13:34), Max: 37.4 (23 Jan 2018 13:34)  T(F): 99.4 (23 Jan 2018 13:34), Max: 99.4 (23 Jan 2018 13:34)  HR: 86 (23 Jan 2018 13:34) (70 - 101)  BP: 128/70 (23 Jan 2018 13:34) (100/55 - 128/76)  BP(mean): --  RR: 18 (23 Jan 2018 13:34) (17 - 18)  SpO2: 94% (23 Jan 2018 13:34) (93% - 97%)       PHYSICAL EXAM:  GENERAL: NAD, well-groomed, lying in bed  HEAD:  Atraumatic, Normocephalic  EYES: EOMI, PERRLA, conjunctiva and sclera clear  ENMT: No tonsillar erythema, exudates, or enlargement; Moist mucous membranes   NECK: Supple, No JVD   NERVOUS SYSTEM:  awake, does not follow commands, confused (even while speaking in creole )  CHEST/LUNG: Clear to auscultation  bilaterally; No rales, rhonchi, wheezing, or rubs  HEART: Regular rate and rhythm; No murmurs appreciated   ABDOMEN: Soft, Nontender, Nondistended; Bowel sounds present  EXTREMITIES:  2+ Peripheral Pulses, No clubbing, cyanosis, or edema  LYMPH: No lymphadenopathy noted    LABS:                        10.7   7.2   )-----------( 232      ( 23 Jan 2018 08:12 )             34.3     01-23    144  |  101  |  18  ----------------------------<  82  3.2<L>   |  35<H>  |  0.62    Ca    8.4<L>      23 Jan 2018 08:12  Phos  3.2     01-23  Mg     2.2     01-23    TPro  6.9  /  Alb  2.8<L>  /  TBili  0.4  /  DBili  x   /  AST  13<L>  /  ALT  17  /  AlkPhos  68  01-23    PT/INR - ( 23 Jan 2018 08:12 )   PT: 11.1 sec;   INR: 1.02 ratio            cardiac markers     CAPILLARY BLOOD GLUCOSE        Cultures    RADIOLOGY & ADDITIONAL TESTS: < from: US Duplex Venous Lower Ext Complete, Bilateral (01.23.18 @ 10:36) >  Positive for RIGHT lower extremity deep venous thrombosis (RIGHT femoral,   popliteal, and posterior tibial veins).           Imaging Personally Reviewed:  [ x] YES  [ ] NO    Consultant(s) Notes Reviewed:  [x ] YES  [ ] NO    Care Discussed with Consultants/Other Providers [ x] YES  [ ] NO

## 2018-01-23 NOTE — PROGRESS NOTE ADULT - PROBLEM SELECTOR PLAN 1
- c/w BIPAP QHS and PRN  - Pulmonary  consulted  - on prednisone for 5 days and zithromax, will d/c zithro (completed 7 day course)  - c/w luann

## 2018-01-23 NOTE — SWALLOW BEDSIDE ASSESSMENT ADULT - PHARYNGEAL PHASE
Delayed pharyngeal swallow/Multiple swallows Multiple swallows/Delayed pharyngeal swallow Delayed pharyngeal swallow/Multiple swallows/Throat clear post oral intake

## 2018-01-23 NOTE — SWALLOW BEDSIDE ASSESSMENT ADULT - SLP PERTINENT HISTORY OF CURRENT PROBLEM
89 y/o F w/h/o COPD, HTN, aspiration pna, and PE admitted and found to have hypercapnea on ABG.  As per ER chart, pt was seen at home for disimpaction by her house physician and sent her to ER for abnormal lung exam-b/l crackles.  In ER, pt was lethargic with dry cough.  ABG done and revealed hypercapnia.  Pt put on BiPAP without any improvement in pCo2 levels.  Family was not at bedside during evaluation.  Will need to obtain more information.    Admitted for hypercarbic respiratory failure requiring non invasive ventilation,  now weaned off BiPAP. Patient with episodes of bradycardia 2nd degree block, no intervention  as per  Cardiology -Dr. Lima, Not candidate for aggressive interventions such as pacemaker due to dementia. , avoid AVN blockers.

## 2018-01-23 NOTE — SWALLOW BEDSIDE ASSESSMENT ADULT - SPECIFY REASON(S)
MD order states poss asp. CNA reported pt ate lunch without difficulty- "she eats everything", and no coughing

## 2018-01-23 NOTE — PROGRESS NOTE ADULT - SUBJECTIVE AND OBJECTIVE BOX
VITALS/LABS  1/23/2018 993 97 160/900 18 92%   1/23/2018 W 7.2 Hb 10.7 Plt 232  Na 144 K 3.2 CO2 35 cr .6   REVIEW OF SYMPTOMS    Able to give ROS  NO   PHYSICAL EXAM    HEENT Unremarkable PERRLA atraumatic   RESP Fair air entry EXP prolonged    Harsh breath sound Resp distres mild   CARDIAC S1 S2 No S3     NO JVD    ABDOMEN SOFT BS PRESENT NOT DISTENDED No hepatosplenomegaly PEDAL EDEMA present No calf tenderness  NO rash   GENERAL Not TOXIC looking  PATIENT DESCRIPTION CC HPI PMH FAM SOCIAL 1/17/2018 ADMISSION LVS  89 f Past Medical History: Alzheimer's Dementia with Behavioral Disturbance   Benign Essential Hypertension   Ovarian cancer  PE (pulmonary thromboembolism)   UTI (urinary tract infection). was sent from home by \[facility]\"" physician who had come to disimpact her for resp distress and was admitted LVS 1/17/2018 to ICU in ac hypercapnic resp failure requiring BPAP   Pt transferred out of ICU an PUlm consulted 1/22/2018  HOSPITAL COURSE RECOMMENDATIONS PLAN 1/17/2018 ADMISSION [facility]S   HOSPITAL COURSE PROBLEM ASSESSMENT PLAN 1/17/2018 ADMISSION [facility]S                                   ACUTE HYPERCAPNIC RESP FAILURE  1/17/2018 bpap 18/6/.4 729/90/65   1/19/2018 2l 752/44/50   1/23 vbg pH 745   DVT  1/22 Check V duplex legs   1/23 V duplex legs R fem dvt   Lvnx 60.2 (1/23) Warfarin 5 (1/23)             INFECTION  PNEUMONIA   1/18/2018 CXr Improving rll patchy opac cw 1/17 Persistent small r pl effsn   1/18 bc n 1/18 rvp n 1/18 flu ab n   1/22-1/23 W 5.6 - 7.2   1/22 Speech eval   Azithro (1/17-1/23)   COPD EX  Duoneb.4 (1/22) Pred 40 (1/19) albuterol 1p.6 (1/17) spiriva (1/17)   1/22 added pulmicort (1/22)   CHF   Hydralazine 25.2 (1/19)   7/5/2017 ECHO EF 60 no ph   EPISODIC SECOND DEGRE AVB   123/2018 Cardio follows   AGITATION   Quetiapine 25.2 (1/19)   GLOBAL ISSUE/BEST PRACTICE:        PROBLEM: Analgesia:     na                        PROBLEM: Sedation:     na               PROBLEM: HOB elevation:   y             PROBLEM: Stress ulcer proph:    na                      PROBLEM: VTE prophylaxis:      lvnx 40 (1/18) --> Lvnx 60.2 (1/23) Warfarin 5 (1/23)                   PROBLEM: Glycemic control:    na   PROBLEM: Nutrition:    Dys 1 puree honey lo Na (1/18)         1/23 Speech laith puree nectar   PROBLEM: Advanced directive: na     PROBLEM: Allergies:  na      TIME SPENT Over 25 minutes aggregate care time spent on encounter; activities included   direct patient care, counseling and/or coordinating care reviewing notes, lab data/ imaging , discussion with multidisciplinary team/ patient  /family. Risks, benefits, alternatives  discussed in detail. Proper antibiotic use issues addressed Questions/concerns  were addressed  as  best as possible

## 2018-01-23 NOTE — CHART NOTE - NSCHARTNOTEFT_GEN_A_CORE
Hospitalist Medicine PA    Patient with + VTE of R LE. Started on Lovenox anticoagulation. Prescription for Xarelto 15 BID x 21 days sent to pharmacy. Covered for $0 copay. Hospitalist Medicine PA    Patient with + VTE of R LE. Started on Lovenox anticoagulation. Prescription for Xarelto 15 BID x 21 days sent to pharmacy to check for coverage. Covered for $0 copay.

## 2018-01-24 DIAGNOSIS — E87.6 HYPOKALEMIA: ICD-10-CM

## 2018-01-24 LAB
ANION GAP SERPL CALC-SCNC: 6 MMOL/L — SIGNIFICANT CHANGE UP (ref 5–17)
BUN SERPL-MCNC: 18 MG/DL — SIGNIFICANT CHANGE UP (ref 7–23)
CALCIUM SERPL-MCNC: 8.4 MG/DL — LOW (ref 8.5–10.1)
CHLORIDE SERPL-SCNC: 102 MMOL/L — SIGNIFICANT CHANGE UP (ref 96–108)
CO2 SERPL-SCNC: 37 MMOL/L — HIGH (ref 22–31)
CREAT SERPL-MCNC: 0.6 MG/DL — SIGNIFICANT CHANGE UP (ref 0.5–1.3)
GLUCOSE SERPL-MCNC: 77 MG/DL — SIGNIFICANT CHANGE UP (ref 70–99)
HCT VFR BLD CALC: 34.8 % — SIGNIFICANT CHANGE UP (ref 34.5–45)
HGB BLD-MCNC: 10.7 G/DL — LOW (ref 11.5–15.5)
INR BLD: 1.04 RATIO — SIGNIFICANT CHANGE UP (ref 0.88–1.16)
MCHC RBC-ENTMCNC: 28.9 PG — SIGNIFICANT CHANGE UP (ref 27–34)
MCHC RBC-ENTMCNC: 30.7 GM/DL — LOW (ref 32–36)
MCV RBC AUTO: 94.1 FL — SIGNIFICANT CHANGE UP (ref 80–100)
NRBC # BLD: 0 /100 WBCS — SIGNIFICANT CHANGE UP (ref 0–0)
PLATELET # BLD AUTO: 266 K/UL — SIGNIFICANT CHANGE UP (ref 150–400)
POTASSIUM SERPL-MCNC: 3.4 MMOL/L — LOW (ref 3.5–5.3)
POTASSIUM SERPL-SCNC: 3.4 MMOL/L — LOW (ref 3.5–5.3)
PROTHROM AB SERPL-ACNC: 11.4 SEC — SIGNIFICANT CHANGE UP (ref 9.8–12.7)
RBC # BLD: 3.7 M/UL — LOW (ref 3.8–5.2)
RBC # FLD: 17.1 % — HIGH (ref 10.3–14.5)
SODIUM SERPL-SCNC: 145 MMOL/L — SIGNIFICANT CHANGE UP (ref 135–145)
WBC # BLD: 6.43 K/UL — SIGNIFICANT CHANGE UP (ref 3.8–10.5)
WBC # FLD AUTO: 6.43 K/UL — SIGNIFICANT CHANGE UP (ref 3.8–10.5)

## 2018-01-24 PROCEDURE — 99233 SBSQ HOSP IP/OBS HIGH 50: CPT

## 2018-01-24 RX ORDER — WARFARIN SODIUM 2.5 MG/1
6 TABLET ORAL ONCE
Qty: 0 | Refills: 0 | Status: COMPLETED | OUTPATIENT
Start: 2018-01-24 | End: 2018-01-24

## 2018-01-24 RX ORDER — POTASSIUM CHLORIDE 20 MEQ
40 PACKET (EA) ORAL ONCE
Qty: 0 | Refills: 0 | Status: COMPLETED | OUTPATIENT
Start: 2018-01-24 | End: 2018-01-24

## 2018-01-24 RX ADMIN — Medication 40 MILLIEQUIVALENT(S): at 17:39

## 2018-01-24 RX ADMIN — Medication 40 MILLIGRAM(S): at 05:06

## 2018-01-24 RX ADMIN — Medication 3 MILLILITER(S): at 23:10

## 2018-01-24 RX ADMIN — SENNA PLUS 1 TABLET(S): 8.6 TABLET ORAL at 21:35

## 2018-01-24 RX ADMIN — ENOXAPARIN SODIUM 60 MILLIGRAM(S): 100 INJECTION SUBCUTANEOUS at 21:35

## 2018-01-24 RX ADMIN — Medication 0.5 MILLIGRAM(S): at 17:45

## 2018-01-24 RX ADMIN — QUETIAPINE FUMARATE 25 MILLIGRAM(S): 200 TABLET, FILM COATED ORAL at 17:38

## 2018-01-24 RX ADMIN — Medication 3 MILLILITER(S): at 17:45

## 2018-01-24 RX ADMIN — Medication 3 MILLILITER(S): at 06:34

## 2018-01-24 RX ADMIN — Medication 3 MILLILITER(S): at 00:20

## 2018-01-24 RX ADMIN — POLYETHYLENE GLYCOL 3350 17 GRAM(S): 17 POWDER, FOR SOLUTION ORAL at 12:09

## 2018-01-24 RX ADMIN — WARFARIN SODIUM 6 MILLIGRAM(S): 2.5 TABLET ORAL at 21:35

## 2018-01-24 RX ADMIN — ENOXAPARIN SODIUM 60 MILLIGRAM(S): 100 INJECTION SUBCUTANEOUS at 09:54

## 2018-01-24 RX ADMIN — Medication 25 MILLIGRAM(S): at 05:06

## 2018-01-24 RX ADMIN — Medication 100 MILLIGRAM(S): at 17:38

## 2018-01-24 RX ADMIN — Medication 0.5 MILLIGRAM(S): at 06:34

## 2018-01-24 RX ADMIN — Medication 3 MILLILITER(S): at 11:22

## 2018-01-24 RX ADMIN — Medication 100 MILLIGRAM(S): at 05:08

## 2018-01-24 RX ADMIN — Medication 25 MILLIGRAM(S): at 17:38

## 2018-01-24 RX ADMIN — QUETIAPINE FUMARATE 25 MILLIGRAM(S): 200 TABLET, FILM COATED ORAL at 05:06

## 2018-01-24 NOTE — PROGRESS NOTE ADULT - SUBJECTIVE AND OBJECTIVE BOX
VITALS/LABS  1/24/2018 afeb 102 130/70 18 93%   1/24/2018 W 6.4 Hb 10.7 Plt 266  Na 145 K 3.4 CO2 37 Cr .6   REVIEW OF SYMPTOMS    Able to give ROS  NO   PHYSICAL EXAM    HEENT Unremarkable PERRLA atraumatic   RESP Fair air entry EXP prolonged    Harsh breath sound Resp distres mild   CARDIAC S1 S2 No S3     NO JVD    ABDOMEN SOFT BS PRESENT NOT DISTENDED No hepatosplenomegaly PEDAL EDEMA present No calf tenderness  NO rash   GENERAL Not TOXIC looking  PATIENT DESCRIPTION CC HPI PMH FAM SOCIAL 1/17/2018 ADMISSION LVS  89 f Past Medical History: Alzheimer's Dementia with Behavioral Disturbance   Benign Essential Hypertension   Ovarian cancer  PE (pulmonary thromboembolism)   UTI (urinary tract infection). was sent from home by [facility] physician who had come to disimpact her for resp distress and was admitted [facility]S 1/17/2018 to ICU in ac hypercapnic resp failure requiring BPAP   Pt transferred out of ICU an PUlm consulted 1/22/2018  HOSPITAL COURSE RECOMMENDATIONS PLAN 1/17/2018 ADMISSION [facility]                                                                     HOSPITAL COURSE PROBLEM ASSESSMENT PLAN 1/17/2018 ADMISSION LVS                                   ACUTE HYPERCAPNIC RESP FAILURE  1/17/2018 bpap 18/6/.4 729/90/65   1/19/2018 2l 752/44/50   1/23 vbg pH 745   DVT  1/22 Check V duplex legs   1/23 V duplex legs R fem dvt   Lvnx 60.2 (1/23) Warfarin 5 (1/23)             INFECTION  PNEUMONIA   1/18/2018 CXr Improving rll patchy opac cw 1/17 Persistent small r pl effsn   1/18 bc n 1/18 rvp n 1/18 flu ab n   1/22-1/23 W 5.6 - 7.2   1/23 pct n   1/22 Speech eval   Azithro (1/17-1/23)   COPD EX  Duoneb.4 (1/22) Pred 40 (1/19) albuterol 1p.6 (1/17) spiriva (1/17)   1/22 added pulmicort (1/22)   CHF   Hydralazine 25.2 (1/19)   7/5/2017 ECHO EF 60 no ph   EPISODIC SECOND DEGRE AVB   123/2018 Cardio follows   AGITATION   Quetiapine 25.2 (1/19)   GLOBAL ISSUE/BEST PRACTICE:        PROBLEM: Analgesia:     na                        PROBLEM: Sedation:     na               PROBLEM: HOB elevation:   y             PROBLEM: Stress ulcer proph:    na                      PROBLEM: VTE prophylaxis:      lvnx 40 (1/18) --> Lvnx 60.2 (1/23) Warfarin 5 (1/23)                   PROBLEM: Glycemic control:    na   PROBLEM: Nutrition:    Dys 1 puree honey lo Na (1/18)         1/23 Speech laith puree nectar   PROBLEM: Advanced directive: na     PROBLEM: Allergies:  na      TIME SPENT Over 25 minutes aggregate care time spent on encounter; activities included   direct patient care, counseling and/or coordinating care reviewing notes, lab data/ imaging , discussion with multidisciplinary team/ patient  /family. Risks, benefits, alternatives  discussed in detail. Proper antibiotic use issues addressed Questions/concerns  were addressed  as  best as possible

## 2018-01-24 NOTE — PROGRESS NOTE ADULT - PROBLEM SELECTOR PLAN 1
- c/w BIPAP QHS and PRN  - Pulmonary  on board   - on prednisone for 5 days and zithromax, will d/c darnell (completed 7 day course)  - c/w luann

## 2018-01-24 NOTE — PROGRESS NOTE ADULT - SUBJECTIVE AND OBJECTIVE BOX
Patient is a 89y old  Female who presents with a chief complaint of shortness of breath. (22 Jan 2018 11:56)       OVERNIGHT EVENTS:    MEDICATIONS  (STANDING):  ALBUTerol    90 MICROgram(s) HFA Inhaler 1 Puff(s) Inhalation every 4 hours  ALBUTerol/ipratropium for Nebulization 3 milliLiter(s) Nebulizer every 6 hours  buDESOnide   0.5 milliGRAM(s) Respule 0.5 milliGRAM(s) Inhalation two times a day  docusate sodium Liquid 100 milliGRAM(s) Oral two times a day  enoxaparin Injectable 60 milliGRAM(s) SubCutaneous <User Schedule>  hydrALAZINE 25 milliGRAM(s) Oral two times a day  hydrALAZINE      polyethylene glycol 3350 17 Gram(s) Oral daily  potassium chloride   Powder 40 milliEquivalent(s) Oral once  QUEtiapine 25 milliGRAM(s) Oral two times a day  senna 1 Tablet(s) Oral at bedtime  tiotropium 18 MICROgram(s) Capsule 1 Capsule(s) Inhalation daily    MEDICATIONS  (PRN):  acetaminophen   Tablet 650 milliGRAM(s) Oral every 6 hours PRN For Temp greater than 38 C (100.4 F)  clonazePAM Tablet 0.5 milliGRAM(s) Oral three times a day PRN agitation         Vital Signs Last 24 Hrs  T(C): 36.8 (24 Jan 2018 13:16), Max: 37.4 (23 Jan 2018 17:00)  T(F): 98.2 (24 Jan 2018 13:16), Max: 99.3 (23 Jan 2018 17:00)  HR: 104 (24 Jan 2018 13:16) (77 - 104)  BP: 148/76 (24 Jan 2018 13:16) (137/79 - 161/90)  BP(mean): --  RR: 20 (24 Jan 2018 13:16) (16 - 20)  SpO2: 94% (24 Jan 2018 13:16) (92% - 98%)    PHYSICAL EXAM:   GENERAL: NAD, well-groomed, lying in bed  HEAD:  Atraumatic, Normocephalic  EYES: EOMI, PERRLA, conjunctiva and sclera clear  ENMT: No tonsillar erythema, exudates, or enlargement; Moist mucous membranes   NECK: Supple, No JVD   NERVOUS SYSTEM:  awake, does not follow commands, confused (even while speaking in creole )  CHEST/LUNG: Clear to auscultation  bilaterally; No rales, rhonchi, wheezing, or rubs  HEART: Regular rate and rhythm; No murmurs appreciated   ABDOMEN: Soft, Nontender, Nondistended; Bowel sounds present  EXTREMITIES:  2+ Peripheral Pulses, No clubbing, cyanosis, or edema  LYMPH: No lymphadenopathy noted        LABS:                        10.7   6.43  )-----------( 266      ( 24 Jan 2018 06:49 )             34.8     01-24    145  |  102  |  18  ----------------------------<  77  3.4<L>   |  37<H>  |  0.60    Ca    8.4<L>      24 Jan 2018 06:49  Phos  3.2     01-23  Mg     2.2     01-23    TPro  6.9  /  Alb  2.8<L>  /  TBili  0.4  /  DBili  x   /  AST  13<L>  /  ALT  17  /  AlkPhos  68  01-23    PT/INR - ( 24 Jan 2018 06:49 )   PT: 11.4 sec;   INR: 1.04 ratio            cardiac markers     CAPILLARY BLOOD GLUCOSE        Cultures    RADIOLOGY & ADDITIONAL TESTS:    Imaging Personally Reviewed:  [ x] YES  [ ] NO    Consultant(s) Notes Reviewed:  [ x] YES  [ ] NO    Care Discussed with Consultants/Other Providers [x ] YES  [ ] NO

## 2018-01-25 LAB
ANION GAP SERPL CALC-SCNC: 6 MMOL/L — SIGNIFICANT CHANGE UP (ref 5–17)
BUN SERPL-MCNC: 18 MG/DL — SIGNIFICANT CHANGE UP (ref 7–23)
CALCIUM SERPL-MCNC: 8.6 MG/DL — SIGNIFICANT CHANGE UP (ref 8.5–10.1)
CHLORIDE SERPL-SCNC: 103 MMOL/L — SIGNIFICANT CHANGE UP (ref 96–108)
CO2 SERPL-SCNC: 35 MMOL/L — HIGH (ref 22–31)
CREAT SERPL-MCNC: 0.67 MG/DL — SIGNIFICANT CHANGE UP (ref 0.5–1.3)
GLUCOSE SERPL-MCNC: 75 MG/DL — SIGNIFICANT CHANGE UP (ref 70–99)
HCT VFR BLD CALC: 34.4 % — LOW (ref 34.5–45)
HGB BLD-MCNC: 10.4 G/DL — LOW (ref 11.5–15.5)
INR BLD: 1.27 RATIO — HIGH (ref 0.88–1.16)
MCHC RBC-ENTMCNC: 28.5 PG — SIGNIFICANT CHANGE UP (ref 27–34)
MCHC RBC-ENTMCNC: 30.2 GM/DL — LOW (ref 32–36)
MCV RBC AUTO: 94.2 FL — SIGNIFICANT CHANGE UP (ref 80–100)
NRBC # BLD: 0 /100 WBCS — SIGNIFICANT CHANGE UP (ref 0–0)
PLATELET # BLD AUTO: 282 K/UL — SIGNIFICANT CHANGE UP (ref 150–400)
POTASSIUM SERPL-MCNC: 4.5 MMOL/L — SIGNIFICANT CHANGE UP (ref 3.5–5.3)
POTASSIUM SERPL-SCNC: 4.5 MMOL/L — SIGNIFICANT CHANGE UP (ref 3.5–5.3)
PROTHROM AB SERPL-ACNC: 13.9 SEC — HIGH (ref 9.8–12.7)
RBC # BLD: 3.65 M/UL — LOW (ref 3.8–5.2)
RBC # FLD: 17.2 % — HIGH (ref 10.3–14.5)
SODIUM SERPL-SCNC: 144 MMOL/L — SIGNIFICANT CHANGE UP (ref 135–145)
WBC # BLD: 7.72 K/UL — SIGNIFICANT CHANGE UP (ref 3.8–10.5)
WBC # FLD AUTO: 7.72 K/UL — SIGNIFICANT CHANGE UP (ref 3.8–10.5)

## 2018-01-25 PROCEDURE — 99233 SBSQ HOSP IP/OBS HIGH 50: CPT

## 2018-01-25 RX ORDER — INFLUENZA VIRUS VACCINE 15; 15; 15; 15 UG/.5ML; UG/.5ML; UG/.5ML; UG/.5ML
0.5 SUSPENSION INTRAMUSCULAR ONCE
Qty: 0 | Refills: 0 | Status: DISCONTINUED | OUTPATIENT
Start: 2018-01-25 | End: 2018-01-30

## 2018-01-25 RX ORDER — WARFARIN SODIUM 2.5 MG/1
7.5 TABLET ORAL ONCE
Qty: 0 | Refills: 0 | Status: COMPLETED | OUTPATIENT
Start: 2018-01-25 | End: 2018-01-25

## 2018-01-25 RX ADMIN — Medication 0.5 MILLIGRAM(S): at 17:47

## 2018-01-25 RX ADMIN — POLYETHYLENE GLYCOL 3350 17 GRAM(S): 17 POWDER, FOR SOLUTION ORAL at 11:18

## 2018-01-25 RX ADMIN — QUETIAPINE FUMARATE 25 MILLIGRAM(S): 200 TABLET, FILM COATED ORAL at 06:02

## 2018-01-25 RX ADMIN — ENOXAPARIN SODIUM 60 MILLIGRAM(S): 100 INJECTION SUBCUTANEOUS at 22:32

## 2018-01-25 RX ADMIN — Medication 3 MILLILITER(S): at 17:47

## 2018-01-25 RX ADMIN — WARFARIN SODIUM 7.5 MILLIGRAM(S): 2.5 TABLET ORAL at 22:32

## 2018-01-25 RX ADMIN — Medication 0.5 MILLIGRAM(S): at 05:17

## 2018-01-25 RX ADMIN — ENOXAPARIN SODIUM 60 MILLIGRAM(S): 100 INJECTION SUBCUTANEOUS at 12:42

## 2018-01-25 RX ADMIN — SENNA PLUS 1 TABLET(S): 8.6 TABLET ORAL at 22:35

## 2018-01-25 RX ADMIN — Medication 100 MILLIGRAM(S): at 17:27

## 2018-01-25 RX ADMIN — Medication 100 MILLIGRAM(S): at 06:02

## 2018-01-25 RX ADMIN — Medication 25 MILLIGRAM(S): at 17:28

## 2018-01-25 RX ADMIN — Medication 3 MILLILITER(S): at 11:48

## 2018-01-25 RX ADMIN — Medication 25 MILLIGRAM(S): at 06:02

## 2018-01-25 RX ADMIN — QUETIAPINE FUMARATE 25 MILLIGRAM(S): 200 TABLET, FILM COATED ORAL at 17:27

## 2018-01-25 RX ADMIN — Medication 3 MILLILITER(S): at 05:17

## 2018-01-25 NOTE — PROGRESS NOTE ADULT - PROBLEM SELECTOR PLAN 1
- c/w BIPAP QHS and PRN  - Pulmonary  on board   - on prednisone for 5 days and zithromax, will d/c d zithro (completed 7 day course)  - c/w luann

## 2018-01-25 NOTE — PROGRESS NOTE ADULT - SUBJECTIVE AND OBJECTIVE BOX
Patient is a 89y old  Female who presents with a chief complaint of shortness of breath. (22 Jan 2018 11:56)       OVERNIGHT EVENTS:    MEDICATIONS  (STANDING):  ALBUTerol    90 MICROgram(s) HFA Inhaler 1 Puff(s) Inhalation every 4 hours  ALBUTerol/ipratropium for Nebulization 3 milliLiter(s) Nebulizer every 6 hours  buDESOnide   0.5 milliGRAM(s) Respule 0.5 milliGRAM(s) Inhalation two times a day  docusate sodium Liquid 100 milliGRAM(s) Oral two times a day  enoxaparin Injectable 60 milliGRAM(s) SubCutaneous <User Schedule>  hydrALAZINE 25 milliGRAM(s) Oral two times a day  hydrALAZINE      influenza   Vaccine 0.5 milliLiter(s) IntraMuscular once  polyethylene glycol 3350 17 Gram(s) Oral daily  QUEtiapine 25 milliGRAM(s) Oral two times a day  senna 1 Tablet(s) Oral at bedtime  tiotropium 18 MICROgram(s) Capsule 1 Capsule(s) Inhalation daily  warfarin 7.5 milliGRAM(s) Oral once    MEDICATIONS  (PRN):  acetaminophen   Tablet 650 milliGRAM(s) Oral every 6 hours PRN For Temp greater than 38 C (100.4 F)  clonazePAM Tablet 0.5 milliGRAM(s) Oral three times a day PRN agitation       Vital Signs Last 24 Hrs  T(C): 36.2 (25 Jan 2018 12:04), Max: 37.1 (24 Jan 2018 23:46)  T(F): 97.2 (25 Jan 2018 12:04), Max: 98.7 (24 Jan 2018 23:46)  HR: 90 (25 Jan 2018 12:04) (87 - 102)  BP: 118/78 (25 Jan 2018 12:04) (118/78 - 144/87)  BP(mean): --  RR: 18 (25 Jan 2018 12:04) (18 - 18)  SpO2: 95% (25 Jan 2018 12:04) (93% - 96%)     PHYSICAL EXAM:   GENERAL: NAD, well-groomed, lying in bed  HEAD:  Atraumatic, Normocephalic  EYES: EOMI, PERRLA, conjunctiva and sclera clear  ENMT: No tonsillar erythema, exudates, or enlargement; Moist mucous membranes   NECK: Supple, No JVD   NERVOUS SYSTEM:  awake, does not follow commands, confused (even while speaking in creole )  CHEST/LUNG: Clear to auscultation  bilaterally; No rales, rhonchi, wheezing, or rubs  HEART: Regular rate and rhythm; No murmurs appreciated   ABDOMEN: Soft, Nontender, Nondistended; Bowel sounds present  EXTREMITIES:  2+ Peripheral Pulses, No clubbing, cyanosis, or edema  LYMPH: No lymphadenopathy noted          LABS:                        10.4   7.72  )-----------( 282      ( 25 Jan 2018 06:18 )             34.4     01-25    144  |  103  |  18  ----------------------------<  75  4.5   |  35<H>  |  0.67    Ca    8.6      25 Jan 2018 06:18      PT/INR - ( 25 Jan 2018 06:14 )   PT: 13.9 sec;   INR: 1.27 ratio            cardiac markers     CAPILLARY BLOOD GLUCOSE        Cultures    RADIOLOGY & ADDITIONAL TESTS:    Imaging Personally Reviewed:  [ x] YES  [ ] NO    Consultant(s) Notes Reviewed:  [ x] YES  [ ] NO    Care Discussed with Consultants/Other Providers [ x] YES  [ ] NO

## 2018-01-25 NOTE — CHART NOTE - NSCHARTNOTEFT_GEN_A_CORE
Upon Nutritional Assessment by the Registered Dietitian your patient was determined to meet criteria / has evidence of the following diagnosis/diagnoses:          [ ]  Mild Protein Calorie Malnutrition        [X ]  Moderate Protein Calorie Malnutrition        [ ] Severe Protein Calorie Malnutrition        [ ] Unspecified Protein Calorie Malnutrition        [ ] Underweight / BMI <19        [ ] Morbid Obesity / BMI > 40      Findings as based on:  •  Comprehensive nutrition assessment and consultation  •  Calorie counts (nutrient intake analysis)  •  Food acceptance and intake status from observations by staff  •  Follow up  •  Patient education  •  Intervention secondary to interdisciplinary rounds  •   concerns      Treatment:    The following diet has been recommended:  Dysphagia 1 - pureed/nectar-thick liquids + Ensure Pudding x 3/day (provides 510 kcal, 12 g protein) for additional nutrition support    PLEASE NOTE: malnutrition dx based on wt loss documented from prior admission + physical signs of mild/moderate malnutrition per [facility] Clinical Malnutrition Guidelines      PROVIDER Section:     By signing this assessment you are acknowledging and agree with the diagnosis/diagnoses assigned by the Registered Dietitian    Comments:

## 2018-01-25 NOTE — CHART NOTE - NSCHARTNOTEFT_GEN_A_CORE
Assessment:     89 y.o. female c PMHx dementia, COPD, HTN, aspiration PNA, PE; admitted c respiratory failure, lethargy    Factors impacting intake: [ ] none [ ] nausea  [ ] vomiting [ ] diarrhea [ ] constipation  [ ]chewing problems [ ] swallowing issues  [X ] other: AMS, breathing difficulty    Diet Prescription: Diet, Pureed:   Dysphagia 1, Pureed, Nectar Consistency Fluid (DYS1NC) (fluid consistency upgraded from honey to nectar-thick per swallow eval on 1/23  Low Sodium (01-23-18 @ 17:39)    Intake: %; total feed    Current Weight: 59 kg (1/25); previous wt 60.6 kg (1/19)  % Weight Change: 3% loss x 6 days    Pertinent Medications: MEDICATIONS  (STANDING):  ALBUTerol    90 MICROgram(s) HFA Inhaler 1 Puff(s) Inhalation every 4 hours  ALBUTerol/ipratropium for Nebulization 3 milliLiter(s) Nebulizer every 6 hours  buDESOnide   0.5 milliGRAM(s) Respule 0.5 milliGRAM(s) Inhalation two times a day  docusate sodium Liquid 100 milliGRAM(s) Oral two times a day  enoxaparin Injectable 60 milliGRAM(s) SubCutaneous <User Schedule>  hydrALAZINE 25 milliGRAM(s) Oral two times a day  hydrALAZINE      influenza   Vaccine 0.5 milliLiter(s) IntraMuscular once  polyethylene glycol 3350 17 Gram(s) Oral daily  QUEtiapine 25 milliGRAM(s) Oral two times a day  senna 1 Tablet(s) Oral at bedtime  tiotropium 18 MICROgram(s) Capsule 1 Capsule(s) Inhalation daily  warfarin 7.5 milliGRAM(s) Oral once    MEDICATIONS  (PRN):  acetaminophen   Tablet 650 milliGRAM(s) Oral every 6 hours PRN For Temp greater than 38 C (100.4 F)  clonazePAM Tablet 0.5 milliGRAM(s) Oral three times a day PRN agitation    Pertinent Labs: 01-25 Na144 mmol/L Glu 75 mg/dL K+ 4.5 mmol/L Cr  0.67 mg/dL BUN 18 mg/dL Phos n/a   Alb n/a   PAB n/a        CAPILLARY BLOOD GLUCOSE    Skin:  suspected DTIs right & left heels; no edema noted; previous 1+ generalized  Bm x 3 (1/24); c fecal & urinary incontinence     Estimated Needs:   [X ] no change since previous assessment  [ ] recalculated:     Previous Nutrition Diagnosis:   [ ] Inadequate Energy Intake [ ]Inadequate Oral Intake [ ] Excessive Energy Intake   [ ] Underweight [ ] Increased Nutrient Needs [ ] Overweight/Obesity   [ ] Altered GI Function [ ] Unintended Weight Loss [ ] Food & Nutrition Related Knowledge Deficit [ ] Malnutrition   [X] Impaired ability to prepare foods/meals    Nutrition Diagnosis is [ ] ongoing  [X ] resolved [ ] not applicable (while in hospital)    New Nutrition Diagnosis:   [ ] Inadequate Energy Intake [ ]Inadequate Oral Intake [ ] Excessive Energy Intake   [ ] Underweight [ ] Increased Nutrient Needs [ ] Overweight/Obesity   [ ] Altered GI Function [ ] Unintended Weight Loss [ ] Food & Nutrition Related Knowledge Deficit  [X ] Moderate Malnutrition in context of chronic illness     Related to:  inadequate energy/protein intake    As evidenced by:  Wt loss of 7.7 kg x 7 months (11%) (based on wt at previous admission 6/30) + physical signs of malnutrition as noted    Nutrition focused physical exam conducted:    Subcutaneous fat loss: [mild ] Orbital fat pads region, [WDL ]Buccal fat region, [moderate ]Triceps region,  [WDL ]Ribs region.    Muscle wasting: [moderate ]Temples region, [WDL ]Clavicle region, [WDL ]Shoulder region, [unable ]Scapula region, [WDL ]Interosseous region,  [WDL ]thigh region, [moderate ]Calf region      Interventions:   Recommend  [X ] Change Diet To:  Dysphagia 1 - pureed/nectar-thick liquids  [X ] Nutrition Supplement: Ensure Pudding x 3/day (provides 510 kcal, 12 g protein)   [ ] Nutrition Support  [ X] Other: feeding assistance    Monitoring and Evaluation:   [X ] PO intake [ x ] Tolerance to diet prescription [ x ] weights [ x ] labs[ x ] follow up per protocol  [ ] other: Assessment:     89 y.o. female c PMHx dementia, COPD, HTN, aspiration PNA, PE; admitted c respiratory failure, lethargy    Factors impacting intake: [ ] none [ ] nausea  [ ] vomiting [ ] diarrhea [ ] constipation  [ ]chewing problems [ ] swallowing issues  [X ] other: AMS, breathing difficulty    Diet Prescription: Diet, Pureed:   Dysphagia 1, Pureed, Nectar Consistency Fluid (DYS1NC) (fluid consistency upgraded from honey to nectar-thick per swallow eval on 1/23  Low Sodium (01-23-18 @ 17:39)    Intake: %; total feed    Current Weight: 59 kg (1/25); previous wt 60.6 kg (1/19)  % Weight Change: 3% loss x 6 days    Pertinent Medications: MEDICATIONS  (STANDING):  ALBUTerol    90 MICROgram(s) HFA Inhaler 1 Puff(s) Inhalation every 4 hours  ALBUTerol/ipratropium for Nebulization 3 milliLiter(s) Nebulizer every 6 hours  buDESOnide   0.5 milliGRAM(s) Respule 0.5 milliGRAM(s) Inhalation two times a day  docusate sodium Liquid 100 milliGRAM(s) Oral two times a day  enoxaparin Injectable 60 milliGRAM(s) SubCutaneous <User Schedule>  hydrALAZINE 25 milliGRAM(s) Oral two times a day  hydrALAZINE      influenza   Vaccine 0.5 milliLiter(s) IntraMuscular once  polyethylene glycol 3350 17 Gram(s) Oral daily  QUEtiapine 25 milliGRAM(s) Oral two times a day  senna 1 Tablet(s) Oral at bedtime  tiotropium 18 MICROgram(s) Capsule 1 Capsule(s) Inhalation daily  warfarin 7.5 milliGRAM(s) Oral once    MEDICATIONS  (PRN):  acetaminophen   Tablet 650 milliGRAM(s) Oral every 6 hours PRN For Temp greater than 38 C (100.4 F)  clonazePAM Tablet 0.5 milliGRAM(s) Oral three times a day PRN agitation    Pertinent Labs: 01-25 Na144 mmol/L Glu 75 mg/dL K+ 4.5 mmol/L Cr  0.67 mg/dL BUN 18 mg/dL Phos n/a   Alb n/a   PAB n/a        CAPILLARY BLOOD GLUCOSE    Skin:  suspected DTIs right & left heels; no edema noted; previous 1+ generalized  Bm x 3 (1/24); c fecal & urinary incontinence     Estimated Needs:   [X ] no change since previous assessment  [ ] recalculated:     Previous Nutrition Diagnosis:   [ ] Inadequate Energy Intake [ ]Inadequate Oral Intake [ ] Excessive Energy Intake   [ ] Underweight [ ] Increased Nutrient Needs [ ] Overweight/Obesity   [ ] Altered GI Function [ ] Unintended Weight Loss [ ] Food & Nutrition Related Knowledge Deficit [ ] Malnutrition   [X] Impaired ability to prepare foods/meals    Nutrition Diagnosis is [ ] ongoing  [X ] resolved [ ] not applicable (while in hospital)    New Nutrition Diagnosis:   [ ] Inadequate Energy Intake [ ]Inadequate Oral Intake [ ] Excessive Energy Intake   [ ] Underweight [ ] Increased Nutrient Needs [ ] Overweight/Obesity   [ ] Altered GI Function [ ] Unintended Weight Loss [ ] Food & Nutrition Related Knowledge Deficit  [X ] Moderate Malnutrition in context of chronic illness     Related to:  inadequate energy/protein intake related to chronic illness (dementia, respiratory failure)    As evidenced by:  Wt loss of 7.7 kg x 7 months (11%) (based on wt at previous admission 6/30) + physical signs of malnutrition as noted    Nutrition focused physical exam conducted:    Subcutaneous fat loss: [mild ] Orbital fat pads region, [WDL ]Buccal fat region, [moderate ]Triceps region,  [WDL ]Ribs region.    Muscle wasting: [moderate ]Temples region, [WDL ]Clavicle region, [WDL ]Shoulder region, [unable ]Scapula region, [WDL ]Interosseous region,  [WDL ]thigh region, [moderate ]Calf region      Interventions:   Recommend  [X ] Change Diet To:  Dysphagia 1 - pureed/nectar-thick liquids  [X ] Nutrition Supplement: Ensure Pudding x 3/day (provides 510 kcal, 12 g protein)   [ ] Nutrition Support  [ X] Other: feeding assistance    Monitoring and Evaluation:   [X ] PO intake [ x ] Tolerance to diet prescription [ x ] weights [ x ] labs[ x ] follow up per protocol  [ ] other:

## 2018-01-25 NOTE — PROGRESS NOTE ADULT - SUBJECTIVE AND OBJECTIVE BOX
VITALS/LABS  1/25/2018 89f 89 130/70 18 98% 59   1/25/2018 W 7.7 Hb 10.4 Plt 282  Na 144 K 4.5 CO2 35 Cr .6   REVIEW OF SYMPTOMS    Able to give ROS  NO   PHYSICAL EXAM    HEENT Unremarkable PERRLA atraumatic   RESP Fair air entry EXP prolonged    Harsh breath sound Resp distres mild   CARDIAC S1 S2 No S3     NO JVD    ABDOMEN SOFT BS PRESENT NOT DISTENDED No hepatosplenomegaly PEDAL EDEMA present No calf tenderness  NO rash   GENERAL Not TOXIC looking  PATIENT DESCRIPTION CC HPI PMH FAM SOCIAL 1/17/2018 ADMISSION [facility]  89 f Past Medical History: Alzheimer's Dementia with Behavioral Disturbance   Benign Essential Hypertension   Ovarian cancer  PE (pulmonary thromboembolism)   UTI (urinary tract infection). was sent from home by [facility] physician who had come to disimpact her for resp distress and was admitted [facility] 1/17/2018 to ICU in ac hypercapnic resp failure requiring BPAP   Pt transferred out of ICU an PUlm consulted 1/22/2018  HOSPITAL COURSE PROBLEM ASSESSMENT PLAN 1/17/2018 ADMISSION [facility]                                   ACUTE HYPERCAPNIC RESP FAILURE  1/17/2018 bpap 18/6/.4 729/90/65   1/19/2018 2l 752/44/50   1/23 vbg pH 745   DVT  1/22 Check V duplex legs   1/23 V duplex legs R fem dvt   Lvnx 60.2 (1/23) Warfarin 5 (1/23)             INFECTION  PNEUMONIA   1/18/2018 CXr Improving rll patchy opac cw 1/17 Persistent small r pl effsn   1/18 bc n 1/18 rvp n 1/18 flu ab n   1/22-1/23 W 5.6 - 7.2   1/23 pct n   1/22 Speech eval   Azithro (1/17-1/23)   COPD EX  Duoneb.4 (1/22) Pred 40 (1/19) albuterol 1p.6 (1/17) spiriva (1/17)   1/22 added pulmicort (1/22)   CHF   Hydralazine 25.2 (1/19)   7/5/2017 ECHO EF 60 no ph   EPISODIC SECOND DEGRE AVB   123/2018 Cardio follows   AGITATION   Quetiapine 25.2 (1/19)   GLOBAL ISSUE/BEST PRACTICE:        PROBLEM: Analgesia:     na                        PROBLEM: Sedation:     na               PROBLEM: HOB elevation:   y             PROBLEM: Stress ulcer proph:    na                      PROBLEM: VTE prophylaxis:      lvnx 40 (1/18) --> Lvnx 60.2 (1/23) Warfarin 5 (1/23)                   PROBLEM: Glycemic control:    na   PROBLEM: Nutrition:    Dys 1 puree honey lo Na (1/18)         1/23 Speech laith puree nectar   PROBLEM: Advanced directive: na     PROBLEM: Allergies:  na      TIME SPENT Over 25 minutes aggregate care time spent on encounter; activities included   direct patient care, counseling and/or coordinating care reviewing notes, lab data/ imaging , discussion with multidisciplinary team/ patient  /family. Risks, benefits, alternatives  discussed in detail. Proper antibiotic use issues addressed Questions/concerns  were addressed  as  best as possible

## 2018-01-26 LAB
ANION GAP SERPL CALC-SCNC: 6 MMOL/L — SIGNIFICANT CHANGE UP (ref 5–17)
BUN SERPL-MCNC: 13 MG/DL — SIGNIFICANT CHANGE UP (ref 7–23)
CALCIUM SERPL-MCNC: 9.1 MG/DL — SIGNIFICANT CHANGE UP (ref 8.5–10.1)
CHLORIDE SERPL-SCNC: 100 MMOL/L — SIGNIFICANT CHANGE UP (ref 96–108)
CO2 SERPL-SCNC: 36 MMOL/L — HIGH (ref 22–31)
CREAT SERPL-MCNC: 0.7 MG/DL — SIGNIFICANT CHANGE UP (ref 0.5–1.3)
GLUCOSE SERPL-MCNC: 83 MG/DL — SIGNIFICANT CHANGE UP (ref 70–99)
HCT VFR BLD CALC: 39.5 % — SIGNIFICANT CHANGE UP (ref 34.5–45)
HGB BLD-MCNC: 11.8 G/DL — SIGNIFICANT CHANGE UP (ref 11.5–15.5)
INR BLD: 1.78 RATIO — HIGH (ref 0.88–1.16)
MCHC RBC-ENTMCNC: 28.2 PG — SIGNIFICANT CHANGE UP (ref 27–34)
MCHC RBC-ENTMCNC: 29.9 GM/DL — LOW (ref 32–36)
MCV RBC AUTO: 94.5 FL — SIGNIFICANT CHANGE UP (ref 80–100)
NRBC # BLD: 0 /100 WBCS — SIGNIFICANT CHANGE UP (ref 0–0)
PLATELET # BLD AUTO: 301 K/UL — SIGNIFICANT CHANGE UP (ref 150–400)
POTASSIUM SERPL-MCNC: 4.1 MMOL/L — SIGNIFICANT CHANGE UP (ref 3.5–5.3)
POTASSIUM SERPL-SCNC: 4.1 MMOL/L — SIGNIFICANT CHANGE UP (ref 3.5–5.3)
PROTHROM AB SERPL-ACNC: 19.6 SEC — HIGH (ref 9.8–12.7)
RBC # BLD: 4.18 M/UL — SIGNIFICANT CHANGE UP (ref 3.8–5.2)
RBC # FLD: 17.2 % — HIGH (ref 10.3–14.5)
SODIUM SERPL-SCNC: 142 MMOL/L — SIGNIFICANT CHANGE UP (ref 135–145)
WBC # BLD: 10 K/UL — SIGNIFICANT CHANGE UP (ref 3.8–10.5)
WBC # FLD AUTO: 10 K/UL — SIGNIFICANT CHANGE UP (ref 3.8–10.5)

## 2018-01-26 PROCEDURE — 99233 SBSQ HOSP IP/OBS HIGH 50: CPT

## 2018-01-26 RX ORDER — QUETIAPINE FUMARATE 200 MG/1
1 TABLET, FILM COATED ORAL
Qty: 0 | Refills: 0 | COMMUNITY
Start: 2018-01-26

## 2018-01-26 RX ORDER — ALBUTEROL 90 UG/1
1 AEROSOL, METERED ORAL
Qty: 180 | Refills: 0 | OUTPATIENT
Start: 2018-01-26 | End: 2018-02-24

## 2018-01-26 RX ORDER — BUDESONIDE, MICRONIZED 100 %
2 POWDER (GRAM) MISCELLANEOUS
Qty: 120 | Refills: 0 | OUTPATIENT
Start: 2018-01-26 | End: 2018-02-24

## 2018-01-26 RX ORDER — TIOTROPIUM BROMIDE 18 UG/1
1 CAPSULE ORAL; RESPIRATORY (INHALATION)
Qty: 30 | Refills: 0 | OUTPATIENT
Start: 2018-01-26 | End: 2018-02-24

## 2018-01-26 RX ORDER — CLONAZEPAM 1 MG
0.5 TABLET ORAL
Qty: 3.5 | Refills: 0 | OUTPATIENT
Start: 2018-01-26 | End: 2018-02-01

## 2018-01-26 RX ORDER — LACTULOSE 10 G/15ML
15 SOLUTION ORAL
Qty: 900 | Refills: 0 | OUTPATIENT
Start: 2018-01-26 | End: 2018-02-24

## 2018-01-26 RX ORDER — DOCUSATE SODIUM 100 MG
10 CAPSULE ORAL
Qty: 600 | Refills: 0 | OUTPATIENT
Start: 2018-01-26 | End: 2018-02-24

## 2018-01-26 RX ORDER — HYDRALAZINE HCL 50 MG
1 TABLET ORAL
Qty: 60 | Refills: 0 | OUTPATIENT
Start: 2018-01-26 | End: 2018-02-24

## 2018-01-26 RX ORDER — POLYETHYLENE GLYCOL 3350 17 G/17G
17 POWDER, FOR SOLUTION ORAL
Qty: 520 | Refills: 0 | OUTPATIENT
Start: 2018-01-26 | End: 2018-02-24

## 2018-01-26 RX ORDER — CLONAZEPAM 1 MG
0.5 TABLET ORAL THREE TIMES A DAY
Qty: 0 | Refills: 0 | Status: DISCONTINUED | OUTPATIENT
Start: 2018-01-26 | End: 2018-01-30

## 2018-01-26 RX ORDER — SENNA PLUS 8.6 MG/1
1 TABLET ORAL
Qty: 0 | Refills: 0 | COMMUNITY
Start: 2018-01-26

## 2018-01-26 RX ORDER — WARFARIN SODIUM 2.5 MG/1
5 TABLET ORAL ONCE
Qty: 0 | Refills: 0 | Status: COMPLETED | OUTPATIENT
Start: 2018-01-26 | End: 2018-01-26

## 2018-01-26 RX ADMIN — Medication 3 MILLILITER(S): at 23:49

## 2018-01-26 RX ADMIN — Medication 25 MILLIGRAM(S): at 17:52

## 2018-01-26 RX ADMIN — Medication 3 MILLILITER(S): at 18:17

## 2018-01-26 RX ADMIN — SENNA PLUS 1 TABLET(S): 8.6 TABLET ORAL at 22:16

## 2018-01-26 RX ADMIN — Medication 100 MILLIGRAM(S): at 05:47

## 2018-01-26 RX ADMIN — POLYETHYLENE GLYCOL 3350 17 GRAM(S): 17 POWDER, FOR SOLUTION ORAL at 11:25

## 2018-01-26 RX ADMIN — Medication 3 MILLILITER(S): at 05:40

## 2018-01-26 RX ADMIN — Medication 3 MILLILITER(S): at 11:07

## 2018-01-26 RX ADMIN — QUETIAPINE FUMARATE 25 MILLIGRAM(S): 200 TABLET, FILM COATED ORAL at 17:52

## 2018-01-26 RX ADMIN — Medication 3 MILLILITER(S): at 00:23

## 2018-01-26 RX ADMIN — QUETIAPINE FUMARATE 25 MILLIGRAM(S): 200 TABLET, FILM COATED ORAL at 05:46

## 2018-01-26 RX ADMIN — WARFARIN SODIUM 5 MILLIGRAM(S): 2.5 TABLET ORAL at 22:16

## 2018-01-26 RX ADMIN — Medication 25 MILLIGRAM(S): at 05:46

## 2018-01-26 RX ADMIN — Medication 0.5 MILLIGRAM(S): at 05:40

## 2018-01-26 RX ADMIN — Medication 0.5 MILLIGRAM(S): at 18:17

## 2018-01-26 RX ADMIN — ENOXAPARIN SODIUM 60 MILLIGRAM(S): 100 INJECTION SUBCUTANEOUS at 11:24

## 2018-01-26 RX ADMIN — Medication 100 MILLIGRAM(S): at 17:54

## 2018-01-26 RX ADMIN — ENOXAPARIN SODIUM 60 MILLIGRAM(S): 100 INJECTION SUBCUTANEOUS at 22:16

## 2018-01-26 NOTE — PROGRESS NOTE ADULT - SUBJECTIVE AND OBJECTIVE BOX
VITALS/LABS  1/26/2018 996 100 105/67 18 95%   1/26/2018 W 10 Hb 11.8 Plt 301  Na 142 K 4.1 CO2 36 Cr .7   REVIEW OF SYMPTOMS    Able to give ROS  NO   PHYSICAL EXAM    HEENT Unremarkable PERRLA atraumatic   RESP Fair air entry EXP prolonged    Harsh breath sound Resp distres mild   CARDIAC S1 S2 No S3     NO JVD    ABDOMEN SOFT BS PRESENT NOT DISTENDED No hepatosplenomegaly PEDAL EDEMA present No calf tenderness  NO rash   GENERAL Not TOXIC looking  PATIENT DESCRIPTION CC HPI PMH FAM SOCIAL 1/17/2018 ADMISSION [facility]  89 f Past Medical History: Alzheimer's Dementia with Behavioral Disturbance   Benign Essential Hypertension   Ovarian cancer  PE (pulmonary thromboembolism)   UTI (urinary tract infection). was sent from home by hospitals physician who had come to disimpact her for resp distress and was admitted [facility] 1/17/2018 to ICU in ac hypercapnic resp failure requiring BPAP   Pt transferred out of ICU an PUlm consulted 1/22/2018  HOSPITAL COURSE PROBLEM ASSESSMENT PLAN 1/17/2018 ADMISSION [facility]                                   ACUTE HYPERCAPNIC RESP FAILURE  1/17/2018 bpap 18/6/.4 729/90/65   1/19/2018 2l 752/44/50   1/23 vbg pH 745   DVT  1/22 Check V duplex legs   1/23 V duplex legs R fem dvt   Lvnx 60.2 (1/23) Warfarin 5 (1/23)             INFECTION  PNEUMONIA   1/18/2018 CXr Improving rll patchy opac cw 1/17 Persistent small r pl effsn   1/18 bc n 1/18 rvp n 1/18 flu ab n   1/22-1/23 W 5.6 - 7.2   1/23 pct n   1/22 Speech eval   Azithro (1/17-1/23)   COPD EX  Duoneb.4 (1/22) Pred 40 (1/19) albuterol 1p.6 (1/17) spiriva (1/17)   1/22 added pulmicort (1/22)   CHF   Hydralazine 25.2 (1/19)   7/5/2017 ECHO EF 60 no ph   EPISODIC SECOND DEGRE AVB   123/2018 Cardio follows   AGITATION   Quetiapine 25.2 (1/19)   GLOBAL ISSUE/BEST PRACTICE:        PROBLEM: Analgesia:     na                        PROBLEM: Sedation:     na               PROBLEM: HOB elevation:   y             PROBLEM: Stress ulcer proph:    na                      PROBLEM: VTE prophylaxis:      lvnx 40 (1/18) --> Lvnx 60.2 (1/23) Warfarin 5 (1/23)                   PROBLEM: Glycemic control:    na   PROBLEM: Nutrition:    Dys 1 puree honey lo Na (1/18)         1/23 Speech laith puree nectar   PROBLEM: Advanced directive: na     PROBLEM: Allergies:  na      TIME SPENT Over 25 minutes aggregate care time spent on encounter; activities included   direct patient care, counseling and/or coordinating care reviewing notes, lab data/ imaging , discussion with multidisciplinary team/ patient  /family. Risks, benefits, alternatives  discussed in detail. Proper antibiotic use issues addressed Questions/concerns  were addressed  as  best as possible

## 2018-01-26 NOTE — PROGRESS NOTE ADULT - PROBLEM SELECTOR PLAN 1
- c/w BIPAP QHS and PRN  - Pulmonary  on board   - on prednisone for 5 days    d/c d zithro (completed 7 day course)  - c/w luann

## 2018-01-26 NOTE — PROGRESS NOTE ADULT - SUBJECTIVE AND OBJECTIVE BOX
Patient is a 89y old  Female who presents with a chief complaint of shortness of breath. (22 Jan 2018 11:56)       OVERNIGHT EVENTS:    MEDICATIONS  (STANDING):  ALBUTerol    90 MICROgram(s) HFA Inhaler 1 Puff(s) Inhalation every 4 hours  ALBUTerol/ipratropium for Nebulization 3 milliLiter(s) Nebulizer every 6 hours  buDESOnide   0.5 milliGRAM(s) Respule 0.5 milliGRAM(s) Inhalation two times a day  docusate sodium Liquid 100 milliGRAM(s) Oral two times a day  enoxaparin Injectable 60 milliGRAM(s) SubCutaneous <User Schedule>  hydrALAZINE 25 milliGRAM(s) Oral two times a day  hydrALAZINE      influenza   Vaccine 0.5 milliLiter(s) IntraMuscular once  polyethylene glycol 3350 17 Gram(s) Oral daily  QUEtiapine 25 milliGRAM(s) Oral two times a day  senna 1 Tablet(s) Oral at bedtime  tiotropium 18 MICROgram(s) Capsule 1 Capsule(s) Inhalation daily    MEDICATIONS  (PRN):  acetaminophen   Tablet 650 milliGRAM(s) Oral every 6 hours PRN For Temp greater than 38 C (100.4 F)  clonazePAM Tablet 0.5 milliGRAM(s) Oral three times a day PRN agitation          Vital Signs Last 24 Hrs  T(C): 37.6 (26 Jan 2018 14:18), Max: 37.6 (26 Jan 2018 14:18)  T(F): 99.6 (26 Jan 2018 14:18), Max: 99.6 (26 Jan 2018 14:18)  HR: 100 (26 Jan 2018 14:18) (88 - 100)  BP: 105/67 (26 Jan 2018 14:18) (105/67 - 158/82)  BP(mean): --  RR: 18 (26 Jan 2018 14:18) (17 - 18)  SpO2: 95% (26 Jan 2018 14:18) (92% - 98%)     PHYSICAL EXAM:   GENERAL: NAD, well-groomed, lying in bed  HEAD:  Atraumatic, Normocephalic  EYES: EOMI, PERRLA, conjunctiva and sclera clear  ENMT: No tonsillar erythema, exudates, or enlargement; Moist mucous membranes   NECK: Supple, No JVD   NERVOUS SYSTEM:  awake, does not follow commands, confused (even while speaking in creole )  CHEST/LUNG: Clear to auscultation  bilaterally; No rales, rhonchi, wheezing, or rubs  HEART: Regular rate and rhythm; No murmurs appreciated   ABDOMEN: Soft, Nontender, Nondistended; Bowel sounds present  EXTREMITIES:  2+ Peripheral Pulses, No clubbing, cyanosis, or edema  LYMPH: No lymphadenopathy noted      LABS:                        11.8   10.00 )-----------( 301      ( 26 Jan 2018 07:43 )             39.5     01-26    142  |  100  |  13  ----------------------------<  83  4.1   |  36<H>  |  0.70    Ca    9.1      26 Jan 2018 07:43      PT/INR - ( 26 Jan 2018 07:43 )   PT: 19.6 sec;   INR: 1.78 ratio            cardiac markers     CAPILLARY BLOOD GLUCOSE        Cultures    RADIOLOGY & ADDITIONAL TESTS:    Imaging Personally Reviewed:  [x ] YES  [ ] NO    Consultant(s) Notes Reviewed:  [x ] YES  [ ] NO    Care Discussed with Consultants/Other Providers [ x] YES  [ ] NO

## 2018-01-26 NOTE — CHART NOTE - NSCHARTNOTEFT_GEN_A_CORE
Hospitalist Medicine PA    Spoke with patient's daughter Tania ([phone]) for House call MD contact information. House call MD is with [facility] ([phone]) - Dr. Mata Herrera. Per Thea Herrera would not be able to see the patient for repeat INR until 1/31. A preemptive appointment has been made for Wednesday 1/31 for Dr. Herrera to see the patient should she be discharged before then.    I relayed this information with Dr. Gibbons who agrees that it is safer for the patient to stay until Monday 1/27 to monitor for safe INR levels. Spoke with daughter Tania as well to let her know patient will be held in hospital to monitor INR levels until discharge is safe. Hospitalist Medicine PA    Spoke with patient's daughter Tania ([phone]) for House call MD contact information. House call MD is with [facility] ([phone]) - Dr. Mata Herrera. Per Thea Herrera would not be able to see the patient for repeat INR until 1/31. A preemptive appointment has been made for Wednesday 1/31 (BUT NOT YET CONFIRMED) for Dr. Herrera to see the patient should she be discharged before then.    I relayed this information with Dr. Gibbons who agrees that it is safer for the patient to stay until Monday 1/27 to monitor for safe INR levels. Spoke with daughter Tania as well to let her know patient will be held in hospital to monitor INR levels until discharge is safe. Hospitalist Medicine PA    Spoke with patient's daughter Tania ([phone]) for House call MD contact information. House call MD is with [facility] ([phone]) - Dr. Mata Herrera. Per Thea Herrera would not be able to see the patient for repeat INR until 1/31. A preemptive appointment has been made for Wednesday 1/31 (BUT NOT YET CONFIRMED) for Dr. Herrera to see the patient should she be discharged before then. The patient will require a repeat INR level within 2 days of discharge for safe discharge.    I relayed this information with Dr. Gibbons who agrees that it is safer for the patient to stay until Monday 1/27 to monitor for safe INR levels. Spoke with daughter Tania as well to let her know patient will be held in hospital to monitor INR levels until discharge is safe.

## 2018-01-27 LAB
ANION GAP SERPL CALC-SCNC: 5 MMOL/L — SIGNIFICANT CHANGE UP (ref 5–17)
BUN SERPL-MCNC: 14 MG/DL — SIGNIFICANT CHANGE UP (ref 7–23)
CALCIUM SERPL-MCNC: 8.7 MG/DL — SIGNIFICANT CHANGE UP (ref 8.5–10.1)
CHLORIDE SERPL-SCNC: 102 MMOL/L — SIGNIFICANT CHANGE UP (ref 96–108)
CO2 SERPL-SCNC: 33 MMOL/L — HIGH (ref 22–31)
CREAT SERPL-MCNC: 0.71 MG/DL — SIGNIFICANT CHANGE UP (ref 0.5–1.3)
GLUCOSE SERPL-MCNC: 91 MG/DL — SIGNIFICANT CHANGE UP (ref 70–99)
HCT VFR BLD CALC: 39.5 % — SIGNIFICANT CHANGE UP (ref 34.5–45)
HGB BLD-MCNC: 11.9 G/DL — SIGNIFICANT CHANGE UP (ref 11.5–15.5)
INR BLD: 2.49 RATIO — HIGH (ref 0.88–1.16)
MCHC RBC-ENTMCNC: 28.3 PG — SIGNIFICANT CHANGE UP (ref 27–34)
MCHC RBC-ENTMCNC: 30.1 GM/DL — LOW (ref 32–36)
MCV RBC AUTO: 93.8 FL — SIGNIFICANT CHANGE UP (ref 80–100)
NRBC # BLD: 0 /100 WBCS — SIGNIFICANT CHANGE UP (ref 0–0)
PLATELET # BLD AUTO: 311 K/UL — SIGNIFICANT CHANGE UP (ref 150–400)
POTASSIUM SERPL-MCNC: 4.1 MMOL/L — SIGNIFICANT CHANGE UP (ref 3.5–5.3)
POTASSIUM SERPL-SCNC: 4.1 MMOL/L — SIGNIFICANT CHANGE UP (ref 3.5–5.3)
PROTHROM AB SERPL-ACNC: 27.7 SEC — HIGH (ref 9.8–12.7)
RBC # BLD: 4.21 M/UL — SIGNIFICANT CHANGE UP (ref 3.8–5.2)
RBC # FLD: 17.2 % — HIGH (ref 10.3–14.5)
SODIUM SERPL-SCNC: 140 MMOL/L — SIGNIFICANT CHANGE UP (ref 135–145)
WBC # BLD: 9.77 K/UL — SIGNIFICANT CHANGE UP (ref 3.8–10.5)
WBC # FLD AUTO: 9.77 K/UL — SIGNIFICANT CHANGE UP (ref 3.8–10.5)

## 2018-01-27 PROCEDURE — 99233 SBSQ HOSP IP/OBS HIGH 50: CPT

## 2018-01-27 RX ORDER — WARFARIN SODIUM 2.5 MG/1
3 TABLET ORAL ONCE
Qty: 0 | Refills: 0 | Status: COMPLETED | OUTPATIENT
Start: 2018-01-27 | End: 2018-01-27

## 2018-01-27 RX ADMIN — Medication 0.5 MILLIGRAM(S): at 05:45

## 2018-01-27 RX ADMIN — Medication 0.5 MILLIGRAM(S): at 17:20

## 2018-01-27 RX ADMIN — Medication 3 MILLILITER(S): at 05:45

## 2018-01-27 RX ADMIN — WARFARIN SODIUM 3 MILLIGRAM(S): 2.5 TABLET ORAL at 22:33

## 2018-01-27 RX ADMIN — POLYETHYLENE GLYCOL 3350 17 GRAM(S): 17 POWDER, FOR SOLUTION ORAL at 11:06

## 2018-01-27 RX ADMIN — Medication 25 MILLIGRAM(S): at 17:11

## 2018-01-27 RX ADMIN — QUETIAPINE FUMARATE 25 MILLIGRAM(S): 200 TABLET, FILM COATED ORAL at 05:15

## 2018-01-27 RX ADMIN — Medication 3 MILLILITER(S): at 17:20

## 2018-01-27 RX ADMIN — Medication 100 MILLIGRAM(S): at 05:15

## 2018-01-27 RX ADMIN — Medication 100 MILLIGRAM(S): at 17:11

## 2018-01-27 RX ADMIN — QUETIAPINE FUMARATE 25 MILLIGRAM(S): 200 TABLET, FILM COATED ORAL at 17:11

## 2018-01-27 RX ADMIN — Medication 3 MILLILITER(S): at 11:18

## 2018-01-27 RX ADMIN — SENNA PLUS 1 TABLET(S): 8.6 TABLET ORAL at 22:32

## 2018-01-27 RX ADMIN — Medication 25 MILLIGRAM(S): at 05:15

## 2018-01-27 NOTE — PROGRESS NOTE ADULT - SUBJECTIVE AND OBJECTIVE BOX
REVIEW OF SYMPTOMS    Able to give ROS  NO   PHYSICAL EXAM    HEENT Unremarkable PERRLA atraumatic   RESP Fair air entry EXP prolonged    Harsh breath sound Resp distres mild   CARDIAC S1 S2 No S3     NO JVD    ABDOMEN SOFT BS PRESENT NOT DISTENDED No hepatosplenomegaly PEDAL EDEMA present No calf tenderness  NO rash   GENERAL Not TOXIC looking  VITALS/LABS  1/27/2018 afeb 98 130/76 18 93%   1/27/2018 W 9.7 Hb 11.9 Plt 311  Na 140 K 4.1 CO2 33 Cr .7   PATIENT DESCRIPTION CC HPI PMH FAM SOCIAL 1/17/2018 ADMISSION [facility]S  89 f Past Medical History: Alzheimer's Dementia with Behavioral Disturbance   Benign Essential Hypertension   Ovarian cancer  PE (pulmonary thromboembolism)   UTI (urinary tract infection). was sent from home by [facility] physician who had come to disimpact her for resp distress and was admitted [facility] 1/17/2018 to ICU in ac hypercapnic resp failure requiring BPAP   Pt transferred out of ICU an PUlm consulted 1/22/2018  HOSPITAL COURSE PROBLEM ASSESSMENT PLAN 1/17/2018 ADMISSION [facility]                                   ACUTE HYPERCAPNIC RESP FAILURE  1/17/2018 bpap 18/6/.4 729/90/65   1/19/2018 2l 752/44/50   1/23 vbg pH 745   DVT  1/22 Check V duplex legs   1/23 V duplex legs R fem dvt   Lvnx 60.2 (1/23) Warfarin 5 (1/23)             INFECTION  PNEUMONIA   1/18/2018 CXr Improving rll patchy opac cw 1/17 Persistent small r pl effsn   1/18 bc n 1/18 rvp n 1/18 flu ab n   1/22-1/23 W 5.6 - 7.2   1/23 pct n   1/22 Speech eval   Azithro (1/17-1/23)     COPD EX  Duoneb.4 (1/22) Pred 40 (1/19) albuterol 1p.6 (1/17) spiriva (1/17)   1/22 added pulmicort (1/22)   CHF   Hydralazine 25.2 (1/19)   7/5/2017 ECHO EF 60 no ph   EPISODIC SECOND DEGRE AVB   123/2018 Cardio follows   AGITATION   Quetiapine 25.2 (1/19)   GLOBAL ISSUE/BEST PRACTICE:        PROBLEM: Analgesia:     na                        PROBLEM: Sedation:     na               PROBLEM: HOB elevation:   y             PROBLEM: Stress ulcer proph:    na                      PROBLEM: VTE prophylaxis:      lvnx 40 (1/18) --> Lvnx 60.2 (1/23) Warfarin 5 (1/23)                   PROBLEM: Glycemic control:    na   PROBLEM: Nutrition:    Dys 1 puree honey lo Na (1/18)         1/23 Speech laith puree nectar   PROBLEM: Advanced directive: na     PROBLEM: Allergies:  na      TIME SPENT Over 25 minutes aggregate care time spent on encounter; activities included   direct patient care, counseling and/or coordinating care reviewing notes, lab data/ imaging , discussion with multidisciplinary team/ patient  /family. Risks, benefits, alternatives  discussed in detail. Proper antibiotic use issues addressed Questions/concerns  were addressed  as  best as possible

## 2018-01-27 NOTE — PROGRESS NOTE ADULT - SUBJECTIVE AND OBJECTIVE BOX
Patient is a 89y old  Female who presents with a chief complaint of shortness of breath. (22 Jan 2018 11:56)        HPI:  89 yo female pt with PMH: COPD, HTN, aspiration pna, and PE admitted and found to have hypercapneic on ABG.  As per ER chart, pt was seen at home for disimpaction by her house physician and sent her to ER for abnormal lung exam-b/l crackles.  In ER, pt was lethargic with dry cough.  ABG done and revealed hypercapnia.  Pt put on bipap without any improvement in pCo2 levels.  Family was not at bedside during evaluation.  Will need to obtain more information.  Pt previously admitted for respiratory failure and was intubated. (17 Jan 2018 18:07)      SUBJECTIVE & OBJECTIVE: Pt seen and examined at bedside.     PHYSICAL EXAM:  T(C): 36.6 (01-27-18 @ 11:36), Max: 37.6 (01-26-18 @ 17:29)  HR: 98 (01-27-18 @ 11:36) (82 - 98)  BP: 134/76 (01-27-18 @ 11:36) (134/76 - 160/75)  RR: 18 (01-27-18 @ 11:36) (18 - 18)  SpO2: 93% (01-27-18 @ 11:36) (92% - 98%) on 6 Liters NC    GENERAL: somnolent at baseline.  afebrile overnight  HEAD:  Atraumatic, Normocephalic  EYES: EOMI, PERRLA, conjunctiva and sclera clear  ENMT: Moist mucous membranes  NECK: Supple, No JVD  NERVOUS SYSTEM:  at baseline mental status, upper extremity contractures   CHEST/LUNG: Clear to auscultation bilaterally; No rales, rhonchi, wheezing, or rubs  HEART: irregularly irregular HR  ABDOMEN: Soft, Nontender, Nondistended; Bowel sounds present  EXTREMITIES:  2+ Peripheral Pulses, No clubbing, cyanosis, or edema        MEDICATIONS  (STANDING):  ALBUTerol    90 MICROgram(s) HFA Inhaler 1 Puff(s) Inhalation every 4 hours  ALBUTerol/ipratropium for Nebulization 3 milliLiter(s) Nebulizer every 6 hours  buDESOnide   0.5 milliGRAM(s) Respule 0.5 milliGRAM(s) Inhalation two times a day  docusate sodium Liquid 100 milliGRAM(s) Oral two times a day  hydrALAZINE 25 milliGRAM(s) Oral two times a day  hydrALAZINE      influenza   Vaccine 0.5 milliLiter(s) IntraMuscular once  polyethylene glycol 3350 17 Gram(s) Oral daily  QUEtiapine 25 milliGRAM(s) Oral two times a day  senna 1 Tablet(s) Oral at bedtime  tiotropium 18 MICROgram(s) Capsule 1 Capsule(s) Inhalation daily  warfarin 3 milliGRAM(s) Oral once    MEDICATIONS  (PRN):  acetaminophen   Tablet 650 milliGRAM(s) Oral every 6 hours PRN For Temp greater than 38 C (100.4 F)  clonazePAM Tablet 0.5 milliGRAM(s) Oral three times a day PRN agitation      LABS:                        11.9   9.77  )-----------( 311      ( 27 Jan 2018 07:45 )             39.5     01-27    140  |  102  |  14  ----------------------------<  91  4.1   |  33<H>  |  0.71    Ca    8.7      27 Jan 2018 07:45      PT/INR - ( 27 Jan 2018 07:45 )   PT: 27.7 sec;   INR: 2.49 ratio        DVT/GI ppx  Discussed with pt @ bedside

## 2018-01-28 LAB
ANION GAP SERPL CALC-SCNC: 4 MMOL/L — LOW (ref 5–17)
BUN SERPL-MCNC: 15 MG/DL — SIGNIFICANT CHANGE UP (ref 7–23)
CALCIUM SERPL-MCNC: 8.7 MG/DL — SIGNIFICANT CHANGE UP (ref 8.5–10.1)
CHLORIDE SERPL-SCNC: 103 MMOL/L — SIGNIFICANT CHANGE UP (ref 96–108)
CO2 SERPL-SCNC: 33 MMOL/L — HIGH (ref 22–31)
CREAT SERPL-MCNC: 0.65 MG/DL — SIGNIFICANT CHANGE UP (ref 0.5–1.3)
GLUCOSE SERPL-MCNC: 87 MG/DL — SIGNIFICANT CHANGE UP (ref 70–99)
HCT VFR BLD CALC: 38.3 % — SIGNIFICANT CHANGE UP (ref 34.5–45)
HGB BLD-MCNC: 11.8 G/DL — SIGNIFICANT CHANGE UP (ref 11.5–15.5)
INR BLD: 2.91 RATIO — HIGH (ref 0.88–1.16)
MCHC RBC-ENTMCNC: 28.9 PG — SIGNIFICANT CHANGE UP (ref 27–34)
MCHC RBC-ENTMCNC: 30.8 GM/DL — LOW (ref 32–36)
MCV RBC AUTO: 93.9 FL — SIGNIFICANT CHANGE UP (ref 80–100)
NRBC # BLD: 0 /100 WBCS — SIGNIFICANT CHANGE UP (ref 0–0)
PLATELET # BLD AUTO: 325 K/UL — SIGNIFICANT CHANGE UP (ref 150–400)
POTASSIUM SERPL-MCNC: 4.5 MMOL/L — SIGNIFICANT CHANGE UP (ref 3.5–5.3)
POTASSIUM SERPL-SCNC: 4.5 MMOL/L — SIGNIFICANT CHANGE UP (ref 3.5–5.3)
PROTHROM AB SERPL-ACNC: 32.4 SEC — HIGH (ref 9.8–12.7)
RBC # BLD: 4.08 M/UL — SIGNIFICANT CHANGE UP (ref 3.8–5.2)
RBC # FLD: 17 % — HIGH (ref 10.3–14.5)
SODIUM SERPL-SCNC: 140 MMOL/L — SIGNIFICANT CHANGE UP (ref 135–145)
WBC # BLD: 10 K/UL — SIGNIFICANT CHANGE UP (ref 3.8–10.5)
WBC # FLD AUTO: 10 K/UL — SIGNIFICANT CHANGE UP (ref 3.8–10.5)

## 2018-01-28 PROCEDURE — 99233 SBSQ HOSP IP/OBS HIGH 50: CPT

## 2018-01-28 RX ADMIN — QUETIAPINE FUMARATE 25 MILLIGRAM(S): 200 TABLET, FILM COATED ORAL at 05:55

## 2018-01-28 RX ADMIN — Medication 25 MILLIGRAM(S): at 05:55

## 2018-01-28 RX ADMIN — QUETIAPINE FUMARATE 25 MILLIGRAM(S): 200 TABLET, FILM COATED ORAL at 17:00

## 2018-01-28 RX ADMIN — Medication 3 MILLILITER(S): at 00:12

## 2018-01-28 RX ADMIN — Medication 3 MILLILITER(S): at 05:39

## 2018-01-28 RX ADMIN — Medication 25 MILLIGRAM(S): at 17:00

## 2018-01-28 RX ADMIN — Medication 0.5 MILLIGRAM(S): at 05:39

## 2018-01-28 RX ADMIN — Medication 0.5 MILLIGRAM(S): at 17:06

## 2018-01-28 RX ADMIN — Medication 3 MILLILITER(S): at 17:06

## 2018-01-28 RX ADMIN — POLYETHYLENE GLYCOL 3350 17 GRAM(S): 17 POWDER, FOR SOLUTION ORAL at 11:07

## 2018-01-28 RX ADMIN — Medication 3 MILLILITER(S): at 12:34

## 2018-01-28 RX ADMIN — Medication 100 MILLIGRAM(S): at 05:55

## 2018-01-28 NOTE — PROGRESS NOTE ADULT - SUBJECTIVE AND OBJECTIVE BOX
VITALS/LABS  1/28/2018 afeb 91 120/60 18 96%   1/28/2018 W 10 Hb 11.8 Plt 325  Na 140 K 4.5 CO2 33 Cr .6   REVIEW OF SYMPTOMS    Able to give ROS  NO   PHYSICAL EXAM    HEENT Unremarkable PERRLA atraumatic   RESP Fair air entry EXP prolonged    Harsh breath sound Resp distres mild   CARDIAC S1 S2 No S3     NO JVD    ABDOMEN SOFT BS PRESENT NOT DISTENDED No hepatosplenomegaly PEDAL EDEMA present No calf tenderness  NO rash   GENERAL Not TOXIC looking  PATIENT DESCRIPTION CC HPI PMH FAM SOCIAL 1/17/2018 ADMISSION LVS  89 f Past Medical History: Alzheimer's Dementia with Behavioral Disturbance   Benign Essential Hypertension   Ovarian cancer  PE (pulmonary thromboembolism)   UTI (urinary tract infection). was sent from home by [facility] physician who had come to disimpact her for resp distress and was admitted LVS 1/17/2018 to ICU in ac hypercapnic resp failure requiring BPAP   Pt transferred out of ICU an PUlm consulted 1/22/2018  HOSPITAL COURSE PROBLEM ASSESSMENT PLAN 1/17/2018 ADMISSION LVS                                   ACUTE HYPERCAPNIC RESP FAILURE  1/17/2018 bpap 18/6/.4 729/90/65   1/19/2018 2l 752/44/50   1/23 vbg pH 745   DVT  1/22 Check V duplex legs   1/23 V duplex legs R fem dvt   Lvnx 60.2 (1/23-1/27) Warfarin 5 (1/23)             INFECTION  PNEUMONIA   1/18/2018 CXr Improving rll patchy opac cw 1/17 Persistent small r pl effsn   1/18 bc n 1/18 rvp n 1/18 flu ab n   1/22-1/23 W 5.6 - 7.2   1/23 pct n   1/22 Speech eval   Azithro (1/17-1/23)     COPD EX  Duoneb.4 (1/22) Pred 40 (1/19) albuterol 1p.6 (1/17) spiriva (1/17)   1/22 added pulmicort (1/22)   CHF   Hydralazine 25.2 (1/19)   7/5/2017 ECHO EF 60 no ph   EPISODIC SECOND DEGRE AVB   123/2018 Cardio follows   AGITATION   Quetiapine 25.2 (1/19)   GLOBAL ISSUE/BEST PRACTICE:        PROBLEM: Analgesia:     na                        PROBLEM: Sedation:     na               PROBLEM: HOB elevation:   y             PROBLEM: Stress ulcer proph:    na                      PROBLEM: VTE prophylaxis:      lvnx 40 (1/18) --> Lvnx 60.2 (1/23) Warfarin 5 (1/23)                   PROBLEM: Glycemic control:    na   PROBLEM: Nutrition:    Dys 1 puree honey lo Na (1/18)         1/23 Speech laith puree nectar   PROBLEM: Advanced directive: na     PROBLEM: Allergies:  na      TIME SPENT Over 25 minutes aggregate care time spent on encounter; activities included   direct patient care, counseling and/or coordinating care reviewing notes, lab data/ imaging , discussion with multidisciplinary team/ patient  /family. Risks, benefits, alternatives  discussed in detail. Proper antibiotic use issues addressed Questions/concerns  were addressed  as  best as possible

## 2018-01-28 NOTE — PROGRESS NOTE ADULT - SUBJECTIVE AND OBJECTIVE BOX
Patient is a 89y old  Female who presents with a chief complaint of shortness of breath. (22 Jan 2018 11:56)        HPI:  89 yo female pt with PMH: COPD, HTN, aspiration pna, and PE admitted and found to have hypercapneic on ABG.  As per ER chart, pt was seen at home for disimpaction by her house physician and sent her to ER for abnormal lung exam-b/l crackles.  In ER, pt was lethargic with dry cough.  ABG done and revealed hypercapnia.  Pt put on bipap without any improvement in pCo2 levels.  Family was not at bedside during evaluation.  Will need to obtain more information.  Pt previously admitted for respiratory failure and was intubated. (17 Jan 2018 18:07)      SUBJECTIVE & OBJECTIVE: Pt seen and examined at bedside. Hatian-creole speaking.  Interview performed by Dr. Holly, no acute events overnight.  No complaints.  Patient is at baseline demented.      PHYSICAL EXAM:  T(C): 36.6 (01-28-18 @ 17:24), Max: 37.5 (01-27-18 @ 23:30)  HR: 89 (01-28-18 @ 18:39) (80 - 92)  BP: 118/70 (01-28-18 @ 17:24) (118/70 - 133/87)  RR: 18 (01-28-18 @ 17:24) (18 - 18)  SpO2: 95% (01-28-18 @ 18:39) (95% - 97%)    GENERAL: NAD, chronically ill appearing  HEAD:  Atraumatic, Normocephalic  EYES: EOMI, PERRLA, conjunctiva and sclera clear  ENMT: Moist mucous membranes  NECK: Supple, No JVD  NERVOUS SYSTEM:  Alert but at baseline confused. , Motor Strength 5/5 B/L upper and lower extremities; DTRs 2+ intact and symmetric  CHEST/LUNG: Clear to auscultation bilaterally; No rales, rhonchi, wheezing, or rubs  HEART: Regular rate and rhythm; No murmurs, rubs, or gallops  ABDOMEN: Soft, Nontender, Nondistended; Bowel sounds present  EXTREMITIES:  2+ Peripheral Pulses, No clubbing, cyanosis, or edema        MEDICATIONS  (STANDING):  ALBUTerol    90 MICROgram(s) HFA Inhaler 1 Puff(s) Inhalation every 4 hours  ALBUTerol/ipratropium for Nebulization 3 milliLiter(s) Nebulizer every 6 hours  buDESOnide   0.5 milliGRAM(s) Respule 0.5 milliGRAM(s) Inhalation two times a day  docusate sodium Liquid 100 milliGRAM(s) Oral two times a day  hydrALAZINE 25 milliGRAM(s) Oral two times a day  hydrALAZINE      influenza   Vaccine 0.5 milliLiter(s) IntraMuscular once  polyethylene glycol 3350 17 Gram(s) Oral daily  QUEtiapine 25 milliGRAM(s) Oral two times a day  senna 1 Tablet(s) Oral at bedtime  tiotropium 18 MICROgram(s) Capsule 1 Capsule(s) Inhalation daily    MEDICATIONS  (PRN):  acetaminophen   Tablet 650 milliGRAM(s) Oral every 6 hours PRN For Temp greater than 38 C (100.4 F)  clonazePAM Tablet 0.5 milliGRAM(s) Oral three times a day PRN agitation      LABS:                        11.8   10.00 )-----------( 325      ( 28 Jan 2018 07:22 )             38.3     01-28    140  |  103  |  15  ----------------------------<  87  4.5   |  33<H>  |  0.65    Ca    8.7      28 Jan 2018 07:25      PT/INR - ( 28 Jan 2018 07:25 )   PT: 32.4 sec;   INR: 2.91 ratio           DVT/GI ppx  Discussed with pt @ bedside

## 2018-01-29 DIAGNOSIS — I44.1 ATRIOVENTRICULAR BLOCK, SECOND DEGREE: ICD-10-CM

## 2018-01-29 DIAGNOSIS — R50.9 FEVER, UNSPECIFIED: ICD-10-CM

## 2018-01-29 LAB
INR BLD: 3.22 RATIO — HIGH (ref 0.88–1.16)
PROTHROM AB SERPL-ACNC: 36 SEC — HIGH (ref 9.8–12.7)

## 2018-01-29 PROCEDURE — 99233 SBSQ HOSP IP/OBS HIGH 50: CPT

## 2018-01-29 RX ADMIN — Medication 3 MILLILITER(S): at 05:37

## 2018-01-29 RX ADMIN — Medication 3 MILLILITER(S): at 00:25

## 2018-01-29 RX ADMIN — Medication 3 MILLILITER(S): at 17:10

## 2018-01-29 RX ADMIN — Medication 25 MILLIGRAM(S): at 17:18

## 2018-01-29 RX ADMIN — Medication 3 MILLILITER(S): at 11:23

## 2018-01-29 RX ADMIN — Medication 0.5 MILLIGRAM(S): at 05:37

## 2018-01-29 RX ADMIN — SENNA PLUS 1 TABLET(S): 8.6 TABLET ORAL at 21:59

## 2018-01-29 RX ADMIN — QUETIAPINE FUMARATE 25 MILLIGRAM(S): 200 TABLET, FILM COATED ORAL at 17:17

## 2018-01-29 RX ADMIN — Medication 0.5 MILLIGRAM(S): at 17:10

## 2018-01-29 RX ADMIN — Medication 100 MILLIGRAM(S): at 17:18

## 2018-01-29 NOTE — PROGRESS NOTE ADULT - PROBLEM SELECTOR PLAN 5
-Patient with episodes of bradycardia 2nd degree block in ICU , no intervention  as per  Cardiology -Dr. Lima, Not candidate for aggressive interventions such as pacemaker due to dementia. , avoid AVN blockers  -monitor.
replete electrolytes

## 2018-01-29 NOTE — PROGRESS NOTE ADULT - PROBLEM SELECTOR PLAN 1
-c/w BIPAP QHS and PRN  -Pulmonary  on board   -on prednisone for 5 days, sp nestor (completed 7 day course)  -c/w luann.

## 2018-01-29 NOTE — PROGRESS NOTE ADULT - ASSESSMENT
89F PMH: COPD, HTN, aspiration pna, and PE with prior resp failure requiring intubation presents to ER for lethargy.  ABG done with acute on chronic hypercapnia.  Placed on bipap. Admitted for hypercarbic respiratory failure requiring non invasive ventilation.     1. PULM  - acute on chronic hypercarbic respiratory failure: weaned off bipap  - cont nocturnal bipap and bipap prn  - will treat for COPD exacerbation: solumedrol and duonebs  - empirically on azithromycin and ceftriaxone to cover possible PNA, however review of prior CXR with similar RLL atelectasis/effusion/opacity  - check procalcitonin level, if negative will d/c antibiotics  - hx of PE: pt not currently on anticoagulation, will need to find out from family pt's medication regimen    2. GEN  - start bowel regimen for constipation  - physical therapy eval  - OOB to chair  - needs goals of care discussion with family once they arrive  - if stable will transfer to medical floor    Critical Care Time: 35 min
89F PMH: advanced dementia, COPD, HTN and PE with prior resp failure requiring intubation, p/w PNA and acute on chronic hypercapnia requiring bipap.    1. PULM  - acute on chronic hypercarbic respiratory failure: weaned off bipap  - cont nocturnal bipap and bipap prn  - cont PO prednisone  - complete course of abx  - hx of PE: pt not currently on anticoagulation -readdress risks and benefits    2. CV  cont hydralazine for better BP control as diastolic pressure high  episodes of bradycardia 2nd degree block  no intervention   Cardiology reccs appreciated  avoid any rate control meds      Transfer to Floors
89 yo lady with a pmhx of dementia, alzheimers, chronic COPD, acute respiratory failure HTN, h/o AVR, prior hx of aspiration pna and PE who presents to the ED with AMS and sob.  Improved respiratory distress and less agitation noted.  Periods of second degree heart block on tele monitor; this has been previously noted on prior admissions.  Avoid AVN blockers.  Supportive measures.  Not candidate for aggressive interventions such as pacemaker due to dementia.  May transfer to floor, continue pulm treatment, will follow only as needed.
89 y/o F w/h/o COPD, HTN, aspiration pna, and PE admitted and found to have hypercapnea on ABG.  As per ER chart, pt was seen at home for disimpaction by her house physician and sent her to ER for abnormal lung exam-b/l crackles.  In ER, pt was lethargic with dry cough.  ABG done and revealed hypercapnia.  Pt put on BiPAP without any improvement in pCo2 levels.  Family was not at bedside during evaluation.  Will need to obtain more information.    Admitted for hypercarbic respiratory failure requiring non invasive ventilation,  now weaned off BiPAP. Patient with episodes of bradycardia 2nd degree block, no intervention  as per  Cardiology -Dr. Lima, Not candidate for aggressive interventions such as pacemaker due to dementia. , avoid AVN blockers.
89 y/o F w/h/o COPD, HTN, aspiration pna, and PE admitted and found to have hypercapnea on ABG.  As per ER chart, pt was seen at home for disimpaction by her house physician and sent her to ER for abnormal lung exam-b/l crackles.  In ER, pt was lethargic with dry cough.  ABG done and revealed hypercapnia.  Pt put on BiPAP without any improvement in pCo2 levels.  Family was not at bedside during evaluation.  Will need to obtain more information.    Admitted for hypercarbic respiratory failure requiring non invasive ventilation,  now weaned off BiPAP. Patient with episodes of bradycardia 2nd degree block, no intervention  as per  Cardiology -Dr. Lima, Not candidate for aggressive interventions such as pacemaker due to dementia. , avoid AVN blockers.    Problem/Plan - 1:  ·  Problem: Acute respiratory failure with hypercapnia.  Plan: - c/w BIPAP QHS and PRN  - Pulmonary  on board   - on prednisone for 5 days    d/c d zithro (completed 7 day course)  - c/w duonebs.     Problem/Plan - 2:  ·  Problem: Benign essential hypertension.  Plan: -monitor BP  - c/w hydralazine.     Problem/Plan - 3:  ·  Problem: Bradycardia.  Plan: -Patient with episodes of bradycardia 2nd degree block in ICU , no intervention  as per  Cardiology -Dr. Lima, Not candidate for aggressive interventions such as pacemaker due to dementia. , avoid AVN blockers  -monitor.     Problem/Plan - 4:  ·  Problem: Acute deep vein thrombosis (DVT) of femoral vein of right lower extremity.    Plan: INR within therapeutic level, d/c lovenox, cw coumadin 3 mg tonight, Daily INR.  D/c on Monday
89F PMH: advanced dementia, COPD, HTN and PE with prior resp failure requiring intubation, p/w PNA and acute on chronic hypercapnia requiring bipap.    1. PULM  - acute on chronic hypercarbic respiratory failure: weaned off bipap  - cont nocturnal bipap and bipap prn  - cont PO prednisone  - complete course of abx  - hx of PE: pt not currently on anticoagulation, will need to find out from family pt's medication regimen    2. CV  cont hydralazine for better BP control as diastolic pressure high  episodes of bradycardia 2nd degree block  no intervention   Cardiology reccs appreciated  avoid any rate control meds
89 y/o F w/h/o COPD, HTN, aspiration pna, and PE admitted and found to have hypercapnea on ABG.  As per ER chart, pt was seen at home for disimpaction by her house physician and sent her to ER for abnormal lung exam-b/l crackles.  In ER, pt was lethargic with dry cough.  ABG done and revealed hypercapnia.  Pt put on BiPAP without any improvement in pCo2 levels.  Family was not at bedside during evaluation.  Will need to obtain more information.    Admitted for hypercarbic respiratory failure requiring non invasive ventilation,  now weaned off BiPAP. Patient with episodes of bradycardia 2nd degree block, no intervention  as per  Cardiology -Dr. Lima, Not candidate for aggressive interventions such as pacemaker due to dementia. , avoid AVN blockers.
89 y/o F w/h/o COPD, HTN, aspiration pna, and PE admitted and found to have hypercapnea on ABG.  As per ER chart, pt was seen at home for disimpaction by her house physician and sent her to ER for abnormal lung exam-b/l crackles.  In ER, pt was lethargic with dry cough.  ABG done and revealed hypercapnia.  Pt put on BiPAP without any improvement in pCo2 levels.  Family was not at bedside during evaluation.  Will need to obtain more information.    Admitted for hypercarbic respiratory failure requiring non invasive ventilation,  now weaned off BiPAP. Patient with episodes of bradycardia 2nd degree block, no intervention  as per  Cardiology -Dr. Lima, Not candidate for aggressive interventions such as pacemaker due to dementia. , avoid AVN blockers.    Problem/Plan - 1:  ·  Problem: Acute respiratory failure with hypercapnia.    Plan: - c/w BIPAP QHS and PRN  - Pulmonary  on board   - on prednisone for 5 days    d/c d zithro (completed 7 day course)  - c/w duonebs.     Problem/Plan - 2:  ·  Problem: Benign essential hypertension.  Plan: -monitor BP  - c/w hydralazine.     Problem/Plan - 3:  ·  Problem: Bradycardia.  Plan: -Patient with episodes of bradycardia 2nd degree block in ICU , no intervention  as per  Cardiology -Dr. Lima, Not candidate for aggressive interventions such as pacemaker due to dementia. , avoid AVN blockers  -monitor.     Problem/Plan - 4:  ·  Problem: Acute deep vein thrombosis (DVT) of femoral vein of right lower extremity.    Plan: INR within therapeutic level, d/c lovenox, Hold PM dose of Coumadin tonight.  INR 2.91, follow am value  D/c on Monday
89F PMH: COPD, HTN, aspiration pna, and PE with prior resp failure requiring intubation presents to ER for lethargy.  ABG done with acute on chronic hypercapnia.  Placed on bipap. Admitted for hypercarbic respiratory failure requiring non invasive ventilation.     1. PULM  - acute on chronic hypercarbic respiratory failure: weaned off bipap  - cont nocturnal bipap and bipap prn  - will treat for COPD exacerbation:  duonebs, d/c solumedrol and change to PO prednisone  - empirically on azithromycin and ceftriaxone to cover possible PNA, however review of prior CXR with similar RLL atelectasis/effusion/opacity, procalcitonin negligible, will d/c ceftriaxone  - cont azithromycin for 5 days for COPD  - hx of PE: pt not currently on anticoagulation, will need to find out from family pt's medication regimen    2. CV  - HTN: resume home amlodipine   - add hydralazine for better BP control as diastolic pressure high    3. GEN  - cont bowel regimen for constipation  - physical therapy eval  - OOB to chair  - needs goals of care discussion with family once they arrive  - stable for transfer to medical floor

## 2018-01-29 NOTE — PROGRESS NOTE ADULT - SUBJECTIVE AND OBJECTIVE BOX
Patient is a 89y old  Female who presents with a chief complaint of shortness of breath. (22 Jan 2018 11:56)      OVERNIGHT EVENTS: +low grade fever of 100.1. dtr is at bedside    REVIEW OF SYSTEMS: poor historian.     MEDICATIONS  (STANDING):  ALBUTerol    90 MICROgram(s) HFA Inhaler 1 Puff(s) Inhalation every 4 hours  ALBUTerol/ipratropium for Nebulization 3 milliLiter(s) Nebulizer every 6 hours  buDESOnide   0.5 milliGRAM(s) Respule 0.5 milliGRAM(s) Inhalation two times a day  docusate sodium Liquid 100 milliGRAM(s) Oral two times a day  hydrALAZINE 25 milliGRAM(s) Oral two times a day  hydrALAZINE      influenza   Vaccine 0.5 milliLiter(s) IntraMuscular once  polyethylene glycol 3350 17 Gram(s) Oral daily  QUEtiapine 25 milliGRAM(s) Oral two times a day  senna 1 Tablet(s) Oral at bedtime  tiotropium 18 MICROgram(s) Capsule 1 Capsule(s) Inhalation daily    MEDICATIONS  (PRN):  acetaminophen   Tablet 650 milliGRAM(s) Oral every 6 hours PRN For Temp greater than 38 C (100.4 F)  clonazePAM Tablet 0.5 milliGRAM(s) Oral three times a day PRN agitation      Allergies    Haldol (Unknown)    Intolerances        T(F): 100.1 (01-29-18 @ 12:12), Max: 100.1 (01-29-18 @ 12:12)  HR: 85 (01-29-18 @ 12:12) (81 - 92)  BP: 139/63 (01-29-18 @ 12:12) (118/70 - 141/70)  RR: 18 (01-29-18 @ 12:12) (18 - 18)  SpO2: 94% (01-29-18 @ 12:12) (94% - 97%)  Wt(kg): --    PHYSICAL EXAM:  GENERAL: NAD, well-groomed, well-developed, elderly female, chronically ill appearing  HEAD:  Atraumatic, Normocephalic  EYES: EOMI, PERRLA, conjunctiva and sclera clear  ENMT: No tonsillar erythema, exudates, or enlargement; Moist mucous membranes  NECK: Supple, No JVD, Normal thyroid  NERVOUS SYSTEM:  Alert & Oriented X0, Motor Strength 3/5 B/L upper and lower extremities; DTRs 2+ intact and symmetric  CHEST/LUNG: Clear to percussion bilaterally; No rales, rhonchi, wheezing, or rubs BL  HEART: Regular rate and rhythm; No murmurs, rubs, or gallops  ABDOMEN: Soft, Nontender, Nondistended; Bowel sounds present  EXTREMITIES:  2+ Peripheral Pulses, No clubbing, cyanosis, or edema BL LE  LYMPH: No lymphadenopathy noted  SKIN: No rashes or lesions    LABS:                        11.8   10.00 )-----------( 325      ( 28 Jan 2018 07:22 )             38.3     01-28    140  |  103  |  15  ----------------------------<  87  4.5   |  33<H>  |  0.65    Ca    8.7      28 Jan 2018 07:25      PT/INR - ( 29 Jan 2018 08:12 )   PT: 36.0 sec;   INR: 3.22 ratio             Cultures;   blood Culture Results:   No growth at 5 days. (01.18.18 @ 00:29)      RADIOLOGY & ADDITIONAL TESTS:    Imaging Personally Reviewed:  [x ] YES      Consultant(s) Notes Reviewed:  [ x] YES     Care Discussed with [x ] Consultants [X ] Patient [ x] Family; dtr  [x ]    [x ]  Other; RN

## 2018-01-29 NOTE — PROGRESS NOTE ADULT - SUBJECTIVE AND OBJECTIVE BOX
VITALS/LABS  1/29/2018 afeb 80 120/60 18 98%  1/28/2018 W 10 Hb 11.8 Plt 325  Na 140 K 4.5 CO2 33 Cr .6   REVIEW OF SYMPTOMS    Able to give ROS  NO   PHYSICAL EXAM    HEENT Unremarkable PERRLA atraumatic   RESP Fair air entry EXP prolonged    Harsh breath sound Resp distres mild   CARDIAC S1 S2 No S3     NO JVD    ABDOMEN SOFT BS PRESENT NOT DISTENDED No hepatosplenomegaly PEDAL EDEMA present No calf tenderness  NO rash   GENERAL Not TOXIC looking  PATIENT DESCRIPTION CC HPI PMH FAM SOCIAL 1/17/2018 ADMISSION LVS  89 f Past Medical History: Alzheimer's Dementia with Behavioral Disturbance   Benign Essential Hypertension   Ovarian cancer  PE (pulmonary thromboembolism)   UTI (urinary tract infection). was sent from home by \[facility]\"" physician who had come to disimpact her for resp distress and was admitted LVS 1/17/2018 to ICU in ac hypercapnic resp failure requiring BPAP   Pt transferred out of ICU an PUlm consulted 1/22/2018  HOSPITAL COURSE PROBLEM ASSESSMENT PLAN 1/17/2018 ADMISSION [facility]                                   ACUTE HYPERCAPNIC RESP FAILURE  1/17/2018 bpap 18/6/.4 729/90/65   1/19/2018 2l 752/44/50   1/23 vbg pH 745   DVT  1/22 Check V duplex legs   1/23 V duplex legs R fem dvt   Lvnx 60.2 (1/23-1/27) Warfarin 5 (1/23)             INFECTION  PNEUMONIA   1/18/2018 CXr Improving rll patchy opac cw 1/17 Persistent small r pl effsn   1/18 bc n 1/18 rvp n 1/18 flu ab n   1/22-1/23 W 5.6 - 7.2   1/23 pct n   1/22 Speech eval   Azithro (1/17-1/23)     COPD EX  Duoneb.4 (1/22) Pred 40 (1/19) albuterol 1p.6 (1/17) spiriva (1/17)   1/22 added pulmicort (1/22)   CHF   Hydralazine 25.2 (1/19)   7/5/2017 ECHO EF 60 no ph   EPISODIC SECOND DEGRE AVB   123/2018 Cardio follows   AGITATION   Quetiapine 25.2 (1/19)   GLOBAL ISSUE/BEST PRACTICE:        PROBLEM: Analgesia:     na                        PROBLEM: Sedation:     na               PROBLEM: HOB elevation:   y             PROBLEM: Stress ulcer proph:    na                      PROBLEM: VTE prophylaxis:      lvnx 40 (1/18) --> Lvnx 60.2 (1/23) Warfarin 5 (1/23)                   PROBLEM: Glycemic control:    na   PROBLEM: Nutrition:    Dys 1 puree honey lo Na (1/18)         1/23 Speech laith puree nectar   PROBLEM: Advanced directive: na     PROBLEM: Allergies:  na      TIME SPENT Over 25 minutes aggregate care time spent on encounter; activities included   direct patient care, counseling and/or coordinating care reviewing notes, lab data/ imaging , discussion with multidisciplinary team/ patient  /family. Risks, benefits, alternatives  discussed in detail. Proper antibiotic use issues addressed Questions/concerns  were addressed  as  best as possible

## 2018-01-29 NOTE — PROGRESS NOTE ADULT - NSHPATTENDINGPLANDISCUSS_GEN_ALL_CORE
called daughter Hanna [phone] and left call backinfo.
called daughter Hanna [phone] and left call backinfo
Pt., RN, dtr

## 2018-01-30 VITALS — DIASTOLIC BLOOD PRESSURE: 98 MMHG | HEART RATE: 88 BPM | SYSTOLIC BLOOD PRESSURE: 143 MMHG

## 2018-01-30 LAB
ANION GAP SERPL CALC-SCNC: 6 MMOL/L — SIGNIFICANT CHANGE UP (ref 5–17)
BUN SERPL-MCNC: 23 MG/DL — SIGNIFICANT CHANGE UP (ref 7–23)
CALCIUM SERPL-MCNC: 9 MG/DL — SIGNIFICANT CHANGE UP (ref 8.5–10.1)
CHLORIDE SERPL-SCNC: 106 MMOL/L — SIGNIFICANT CHANGE UP (ref 96–108)
CO2 SERPL-SCNC: 30 MMOL/L — SIGNIFICANT CHANGE UP (ref 22–31)
CREAT SERPL-MCNC: 0.66 MG/DL — SIGNIFICANT CHANGE UP (ref 0.5–1.3)
GLUCOSE SERPL-MCNC: 85 MG/DL — SIGNIFICANT CHANGE UP (ref 70–99)
HCT VFR BLD CALC: 38 % — SIGNIFICANT CHANGE UP (ref 34.5–45)
HGB BLD-MCNC: 11.6 G/DL — SIGNIFICANT CHANGE UP (ref 11.5–15.5)
INR BLD: 3.08 RATIO — HIGH (ref 0.88–1.16)
MCHC RBC-ENTMCNC: 28.6 PG — SIGNIFICANT CHANGE UP (ref 27–34)
MCHC RBC-ENTMCNC: 30.5 GM/DL — LOW (ref 32–36)
MCV RBC AUTO: 93.8 FL — SIGNIFICANT CHANGE UP (ref 80–100)
NRBC # BLD: 0 /100 WBCS — SIGNIFICANT CHANGE UP (ref 0–0)
PLATELET # BLD AUTO: 268 K/UL — SIGNIFICANT CHANGE UP (ref 150–400)
POTASSIUM SERPL-MCNC: 4.4 MMOL/L — SIGNIFICANT CHANGE UP (ref 3.5–5.3)
POTASSIUM SERPL-SCNC: 4.4 MMOL/L — SIGNIFICANT CHANGE UP (ref 3.5–5.3)
PROTHROM AB SERPL-ACNC: 34.3 SEC — HIGH (ref 9.8–12.7)
RBC # BLD: 4.05 M/UL — SIGNIFICANT CHANGE UP (ref 3.8–5.2)
RBC # FLD: 16.8 % — HIGH (ref 10.3–14.5)
SODIUM SERPL-SCNC: 142 MMOL/L — SIGNIFICANT CHANGE UP (ref 135–145)
WBC # BLD: 8.18 K/UL — SIGNIFICANT CHANGE UP (ref 3.8–10.5)
WBC # FLD AUTO: 8.18 K/UL — SIGNIFICANT CHANGE UP (ref 3.8–10.5)

## 2018-01-30 PROCEDURE — 99239 HOSP IP/OBS DSCHRG MGMT >30: CPT

## 2018-01-30 RX ORDER — SODIUM CHLORIDE 9 MG/ML
1000 INJECTION, SOLUTION INTRAVENOUS
Qty: 0 | Refills: 0 | Status: DISCONTINUED | OUTPATIENT
Start: 2018-01-30 | End: 2018-01-30

## 2018-01-30 RX ORDER — WARFARIN SODIUM 2.5 MG/1
1 TABLET ORAL
Qty: 5 | Refills: 0 | OUTPATIENT
Start: 2018-01-30 | End: 2018-02-03

## 2018-01-30 RX ADMIN — SODIUM CHLORIDE 75 MILLILITER(S): 9 INJECTION, SOLUTION INTRAVENOUS at 11:35

## 2018-01-30 RX ADMIN — Medication 3 MILLILITER(S): at 01:08

## 2018-01-30 RX ADMIN — Medication 3 MILLILITER(S): at 11:39

## 2018-01-30 RX ADMIN — Medication 3 MILLILITER(S): at 06:59

## 2018-01-30 RX ADMIN — Medication 0.5 MILLIGRAM(S): at 06:59

## 2018-01-30 NOTE — PROGRESS NOTE ADULT - SUBJECTIVE AND OBJECTIVE BOX
VITALS/LABS  1/30/2018 afeb 85 95/77 18 97%   1/30/2018 W 8.1 Hb 11.6 Plt 268 INR 3 Na 142 K 4.4 CO2 30 Cr .6   REVIEW OF SYMPTOMS    Able to give ROS  NO   PHYSICAL EXAM    HEENT Unremarkable PERRLA atraumatic   RESP Fair air entry EXP prolonged    Harsh breath sound Resp distres mild   CARDIAC S1 S2 No S3     NO JVD    ABDOMEN SOFT BS PRESENT NOT DISTENDED No hepatosplenomegaly PEDAL EDEMA present No calf tenderness  NO rash   GENERAL Not TOXIC looking  PATIENT DESCRIPTION CC HPI PMH FAM SOCIAL 1/17/2018 ADMISSION LVS  89 f Past Medical History: Alzheimer's Dementia with Behavioral Disturbance   Benign Essential Hypertension   Ovarian cancer  PE (pulmonary thromboembolism)   UTI (urinary tract infection). was sent from home by [facility] physician who had come to disimpact her for resp distress and was admitted LVS 1/17/2018 to ICU in ac hypercapnic resp failure requiring BPAP   Pt transferred out of ICU an PUlm consulted 1/22/2018  HOSPITAL COURSE PROBLEM ASSESSMENT PLAN 1/17/2018 ADMISSION [facility]                                   ACUTE HYPERCAPNIC RESP FAILURE  1/17/2018 bpap 18/6/.4 729/90/65   1/19/2018 2l 752/44/50   1/23 vbg pH 745   Resp failure compensated   DVT  1/22 Check V duplex legs   1/23 V duplex legs R fem dvt   Lvnx 60.2 (1/23-1/27) Warfarin 5 (1/23)      On coumadin Therapeutic          INFECTION  PNEUMONIA   1/18/2018 CXr Improving rll patchy opac cw 1/17 Persistent small r pl effsn   1/18 bc n 1/18 rvp n 1/18 flu ab n   1/22-1/23 W 5.6 - 7.2   1/23 pct n   1/22 Speech eval   Azithro (1/17-1/23)   No evidence of uncontrolled infection    COPD EX  Duoneb.4 (1/22) Pred 40 (1/19) albuterol 1p.6 (1/17) spiriva (1/17)   1/22 added pulmicort (1/22)   COPD under control  CHF   Hydralazine 25.2 (1/19)   7/5/2017 ECHO EF 60 no ph   Compensated  EPISODIC SECOND DEGRE AVB   123/2018 Cardio follows   AGITATION   Quetiapine 25.2 (1/19)   PLAN   DC planning   GLOBAL ISSUE/BEST PRACTICE:        PROBLEM: Analgesia:     na                        PROBLEM: Sedation:     na               PROBLEM: HOB elevation:   y             PROBLEM: Stress ulcer proph:    na                      PROBLEM: VTE prophylaxis:      lvnx 40 (1/18) --> Lvnx 60.2 (1/23) Warfarin 5 (1/23)                   PROBLEM: Glycemic control:    na   PROBLEM: Nutrition:    Dys 1 puree honey lo Na (1/18)         1/23 Speech laith puree nectar   PROBLEM: Advanced directive: na     PROBLEM: Allergies:  na      TIME SPENT Over 25 minutes aggregate care time spent on encounter; activities included   direct patient care, counseling and/or coordinating care reviewing notes, lab data/ imaging , discussion with multidisciplinary team/ patient  /family. Risks, benefits, alternatives  discussed in detail. Proper antibiotic use issues addressed Questions/concerns  were addressed  as  best as possible

## 2018-01-30 NOTE — PROGRESS NOTE ADULT - PROVIDER SPECIALTY LIST ADULT
Cardiology
Critical Care
Critical Care
Hospitalist
Pulmonology
Critical Care
Critical Care

## 2018-02-01 DIAGNOSIS — J96.02 ACUTE RESPIRATORY FAILURE WITH HYPERCAPNIA: ICD-10-CM

## 2018-02-01 DIAGNOSIS — Z88.8 ALLERGY STATUS TO OTHER DRUGS, MEDICAMENTS AND BIOLOGICAL SUBSTANCES STATUS: ICD-10-CM

## 2018-02-01 DIAGNOSIS — N39.0 URINARY TRACT INFECTION, SITE NOT SPECIFIED: ICD-10-CM

## 2018-02-01 DIAGNOSIS — I82.411 ACUTE EMBOLISM AND THROMBOSIS OF RIGHT FEMORAL VEIN: ICD-10-CM

## 2018-02-01 DIAGNOSIS — E87.6 HYPOKALEMIA: ICD-10-CM

## 2018-02-01 DIAGNOSIS — R45.1 RESTLESSNESS AND AGITATION: ICD-10-CM

## 2018-02-01 DIAGNOSIS — F02.81 DEMENTIA IN OTHER DISEASES CLASSIFIED ELSEWHERE, UNSPECIFIED SEVERITY, WITH BEHAVIORAL DISTURBANCE: ICD-10-CM

## 2018-02-01 DIAGNOSIS — G30.9 ALZHEIMER'S DISEASE, UNSPECIFIED: ICD-10-CM

## 2018-02-01 DIAGNOSIS — J44.0 CHRONIC OBSTRUCTIVE PULMONARY DISEASE WITH (ACUTE) LOWER RESPIRATORY INFECTION: ICD-10-CM

## 2018-02-01 DIAGNOSIS — E44.0 MODERATE PROTEIN-CALORIE MALNUTRITION: ICD-10-CM

## 2018-02-01 DIAGNOSIS — R00.1 BRADYCARDIA, UNSPECIFIED: ICD-10-CM

## 2018-02-01 DIAGNOSIS — I11.0 HYPERTENSIVE HEART DISEASE WITH HEART FAILURE: ICD-10-CM

## 2018-02-01 DIAGNOSIS — I50.9 HEART FAILURE, UNSPECIFIED: ICD-10-CM

## 2018-02-01 DIAGNOSIS — Z74.01 BED CONFINEMENT STATUS: ICD-10-CM

## 2018-02-01 DIAGNOSIS — J44.1 CHRONIC OBSTRUCTIVE PULMONARY DISEASE WITH (ACUTE) EXACERBATION: ICD-10-CM

## 2018-02-01 DIAGNOSIS — J18.9 PNEUMONIA, UNSPECIFIED ORGANISM: ICD-10-CM

## 2018-02-01 DIAGNOSIS — I44.1 ATRIOVENTRICULAR BLOCK, SECOND DEGREE: ICD-10-CM

## 2018-02-01 DIAGNOSIS — Z79.02 LONG TERM (CURRENT) USE OF ANTITHROMBOTICS/ANTIPLATELETS: ICD-10-CM

## 2018-02-01 DIAGNOSIS — R50.9 FEVER, UNSPECIFIED: ICD-10-CM

## 2018-02-01 DIAGNOSIS — Z86.711 PERSONAL HISTORY OF PULMONARY EMBOLISM: ICD-10-CM

## 2018-02-01 DIAGNOSIS — R06.02 SHORTNESS OF BREATH: ICD-10-CM

## 2018-06-27 ENCOUNTER — INPATIENT (INPATIENT)
Facility: HOSPITAL | Age: 83
LOS: 9 days | Discharge: HOME HEALTH | End: 2018-07-07
Attending: HOSPITALIST | Admitting: HOSPITALIST
Payer: MEDICARE

## 2018-06-27 VITALS
HEART RATE: 117 BPM | RESPIRATION RATE: 17 BRPM | OXYGEN SATURATION: 86 % | WEIGHT: 110.01 LBS | DIASTOLIC BLOOD PRESSURE: 58 MMHG | SYSTOLIC BLOOD PRESSURE: 129 MMHG | TEMPERATURE: 100 F

## 2018-06-27 DIAGNOSIS — Z95.2 PRESENCE OF PROSTHETIC HEART VALVE: Chronic | ICD-10-CM

## 2018-06-27 LAB
APTT BLD: 68.3 SEC — HIGH (ref 27.5–37.4)
BASOPHILS # BLD AUTO: 0.04 K/UL — SIGNIFICANT CHANGE UP (ref 0–0.2)
BASOPHILS NFR BLD AUTO: 0.2 % — SIGNIFICANT CHANGE UP (ref 0–2)
EOSINOPHIL # BLD AUTO: 0 K/UL — SIGNIFICANT CHANGE UP (ref 0–0.5)
EOSINOPHIL NFR BLD AUTO: 0 % — SIGNIFICANT CHANGE UP (ref 0–6)
HCT VFR BLD CALC: 44.8 % — SIGNIFICANT CHANGE UP (ref 34.5–45)
HGB BLD-MCNC: 12.5 G/DL — SIGNIFICANT CHANGE UP (ref 11.5–15.5)
IMM GRANULOCYTES NFR BLD AUTO: 0.6 % — SIGNIFICANT CHANGE UP (ref 0–1.5)
INR BLD: 14.56 RATIO — CRITICAL HIGH (ref 0.88–1.16)
LACTATE SERPL-SCNC: 2.3 MMOL/L — HIGH (ref 0.7–2)
LYMPHOCYTES # BLD AUTO: 1.93 K/UL — SIGNIFICANT CHANGE UP (ref 1–3.3)
LYMPHOCYTES # BLD AUTO: 10.4 % — LOW (ref 13–44)
MCHC RBC-ENTMCNC: 26 PG — LOW (ref 27–34)
MCHC RBC-ENTMCNC: 27.9 GM/DL — LOW (ref 32–36)
MCV RBC AUTO: 93.3 FL — SIGNIFICANT CHANGE UP (ref 80–100)
MONOCYTES # BLD AUTO: 0.84 K/UL — SIGNIFICANT CHANGE UP (ref 0–0.9)
MONOCYTES NFR BLD AUTO: 4.5 % — SIGNIFICANT CHANGE UP (ref 2–14)
NEUTROPHILS # BLD AUTO: 15.62 K/UL — HIGH (ref 1.8–7.4)
NEUTROPHILS NFR BLD AUTO: 84.3 % — HIGH (ref 43–77)
NRBC # BLD: 0 /100 WBCS — SIGNIFICANT CHANGE UP (ref 0–0)
PLATELET # BLD AUTO: 230 K/UL — SIGNIFICANT CHANGE UP (ref 150–400)
PROTHROM AB SERPL-ACNC: 167.6 SEC — HIGH (ref 9.8–12.7)
RBC # BLD: 4.8 M/UL — SIGNIFICANT CHANGE UP (ref 3.8–5.2)
RBC # FLD: 20.7 % — HIGH (ref 10.3–14.5)
WBC # BLD: 18.54 K/UL — HIGH (ref 3.8–10.5)
WBC # FLD AUTO: 18.54 K/UL — HIGH (ref 3.8–10.5)

## 2018-06-27 PROCEDURE — 71045 X-RAY EXAM CHEST 1 VIEW: CPT | Mod: 26

## 2018-06-27 PROCEDURE — 99285 EMERGENCY DEPT VISIT HI MDM: CPT

## 2018-06-27 RX ORDER — SODIUM CHLORIDE 9 MG/ML
3 INJECTION INTRAMUSCULAR; INTRAVENOUS; SUBCUTANEOUS ONCE
Qty: 0 | Refills: 0 | Status: COMPLETED | OUTPATIENT
Start: 2018-06-27 | End: 2018-06-27

## 2018-06-27 RX ORDER — PIPERACILLIN AND TAZOBACTAM 4; .5 G/20ML; G/20ML
3.38 INJECTION, POWDER, LYOPHILIZED, FOR SOLUTION INTRAVENOUS ONCE
Qty: 0 | Refills: 0 | Status: COMPLETED | OUTPATIENT
Start: 2018-06-27 | End: 2018-06-28

## 2018-06-27 RX ORDER — ACETAMINOPHEN 500 MG
650 TABLET ORAL ONCE
Qty: 0 | Refills: 0 | Status: COMPLETED | OUTPATIENT
Start: 2018-06-27 | End: 2018-06-28

## 2018-06-27 RX ORDER — VANCOMYCIN HCL 1 G
1000 VIAL (EA) INTRAVENOUS ONCE
Qty: 0 | Refills: 0 | Status: COMPLETED | OUTPATIENT
Start: 2018-06-27 | End: 2018-06-28

## 2018-06-27 RX ORDER — SODIUM CHLORIDE 9 MG/ML
1000 INJECTION INTRAMUSCULAR; INTRAVENOUS; SUBCUTANEOUS ONCE
Qty: 0 | Refills: 0 | Status: COMPLETED | OUTPATIENT
Start: 2018-06-27 | End: 2018-06-27

## 2018-06-27 RX ADMIN — SODIUM CHLORIDE 3 MILLILITER(S): 9 INJECTION INTRAMUSCULAR; INTRAVENOUS; SUBCUTANEOUS at 23:25

## 2018-06-27 NOTE — PROGRESS NOTE ADULT - PROBLEM SELECTOR PLAN 1
See monthly summary comments.  
cardiology consult
cont abx
pt improve on BIPAP, pt will need BIPAP at home
ct angio no significant change since 10/23; bronchiectases and cavity no change in status since 10/2016

## 2018-06-28 DIAGNOSIS — E87.0 HYPEROSMOLALITY AND HYPERNATREMIA: ICD-10-CM

## 2018-06-28 DIAGNOSIS — R79.1 ABNORMAL COAGULATION PROFILE: ICD-10-CM

## 2018-06-28 DIAGNOSIS — J44.9 CHRONIC OBSTRUCTIVE PULMONARY DISEASE, UNSPECIFIED: ICD-10-CM

## 2018-06-28 DIAGNOSIS — I10 ESSENTIAL (PRIMARY) HYPERTENSION: ICD-10-CM

## 2018-06-28 DIAGNOSIS — G30.8 OTHER ALZHEIMER'S DISEASE: ICD-10-CM

## 2018-06-28 DIAGNOSIS — E86.0 DEHYDRATION: ICD-10-CM

## 2018-06-28 DIAGNOSIS — N17.9 ACUTE KIDNEY FAILURE, UNSPECIFIED: ICD-10-CM

## 2018-06-28 DIAGNOSIS — J18.9 PNEUMONIA, UNSPECIFIED ORGANISM: ICD-10-CM

## 2018-06-28 LAB
ALBUMIN SERPL ELPH-MCNC: 2.5 G/DL — LOW (ref 3.3–5)
ALBUMIN SERPL ELPH-MCNC: 2.7 G/DL — LOW (ref 3.3–5)
ALP SERPL-CCNC: 62 U/L — SIGNIFICANT CHANGE UP (ref 40–120)
ALP SERPL-CCNC: 67 U/L — SIGNIFICANT CHANGE UP (ref 40–120)
ALT FLD-CCNC: 10 U/L — LOW (ref 12–78)
ALT FLD-CCNC: 9 U/L — LOW (ref 12–78)
ANION GAP SERPL CALC-SCNC: 10 MMOL/L — SIGNIFICANT CHANGE UP (ref 5–17)
ANION GAP SERPL CALC-SCNC: 9 MMOL/L — SIGNIFICANT CHANGE UP (ref 5–17)
APPEARANCE UR: CLEAR — SIGNIFICANT CHANGE UP
APTT BLD: 33.6 SEC — SIGNIFICANT CHANGE UP (ref 27.5–37.4)
AST SERPL-CCNC: 15 U/L — SIGNIFICANT CHANGE UP (ref 15–37)
AST SERPL-CCNC: 16 U/L — SIGNIFICANT CHANGE UP (ref 15–37)
BASE EXCESS BLDA CALC-SCNC: 3.6 MMOL/L — HIGH (ref -2–2)
BASE EXCESS BLDA CALC-SCNC: 5.6 MMOL/L — HIGH (ref -2–2)
BASE EXCESS BLDA CALC-SCNC: SIGNIFICANT CHANGE UP MMOL/L (ref -2–2)
BASOPHILS # BLD AUTO: 0.03 K/UL — SIGNIFICANT CHANGE UP (ref 0–0.2)
BASOPHILS NFR BLD AUTO: 0.2 % — SIGNIFICANT CHANGE UP (ref 0–2)
BILIRUB SERPL-MCNC: 0.7 MG/DL — SIGNIFICANT CHANGE UP (ref 0.2–1.2)
BILIRUB SERPL-MCNC: 0.7 MG/DL — SIGNIFICANT CHANGE UP (ref 0.2–1.2)
BILIRUB UR-MCNC: ABNORMAL
BLOOD GAS COMMENTS: SIGNIFICANT CHANGE UP
BLOOD GAS SOURCE: SIGNIFICANT CHANGE UP
BUN SERPL-MCNC: 79 MG/DL — HIGH (ref 7–23)
BUN SERPL-MCNC: 80 MG/DL — HIGH (ref 7–23)
CALCIUM SERPL-MCNC: 8.1 MG/DL — LOW (ref 8.5–10.1)
CALCIUM SERPL-MCNC: 8.3 MG/DL — LOW (ref 8.5–10.1)
CHLORIDE SERPL-SCNC: 118 MMOL/L — HIGH (ref 96–108)
CHLORIDE SERPL-SCNC: 118 MMOL/L — HIGH (ref 96–108)
CK MB BLD-MCNC: 0.9 % — SIGNIFICANT CHANGE UP (ref 0–3.5)
CK MB CFR SERPL CALC: 1 NG/ML — SIGNIFICANT CHANGE UP (ref 0.5–3.6)
CK SERPL-CCNC: 113 U/L — SIGNIFICANT CHANGE UP (ref 26–192)
CO2 SERPL-SCNC: 29 MMOL/L — SIGNIFICANT CHANGE UP (ref 22–31)
CO2 SERPL-SCNC: 30 MMOL/L — SIGNIFICANT CHANGE UP (ref 22–31)
COLOR SPEC: YELLOW — SIGNIFICANT CHANGE UP
CREAT SERPL-MCNC: 3.12 MG/DL — HIGH (ref 0.5–1.3)
CREAT SERPL-MCNC: 3.15 MG/DL — HIGH (ref 0.5–1.3)
DIFF PNL FLD: ABNORMAL
EOSINOPHIL # BLD AUTO: 0 K/UL — SIGNIFICANT CHANGE UP (ref 0–0.5)
EOSINOPHIL NFR BLD AUTO: 0 % — SIGNIFICANT CHANGE UP (ref 0–6)
EPI CELLS # UR: ABNORMAL
GLUCOSE SERPL-MCNC: 153 MG/DL — HIGH (ref 70–99)
GLUCOSE SERPL-MCNC: 168 MG/DL — HIGH (ref 70–99)
GLUCOSE UR QL: NEGATIVE MG/DL — SIGNIFICANT CHANGE UP
HCO3 BLDA-SCNC: 30 MMOL/L — HIGH (ref 21–29)
HCO3 BLDA-SCNC: 30 MMOL/L — HIGH (ref 21–29)
HCO3 BLDA-SCNC: 31 MMOL/L — HIGH (ref 21–29)
HCT VFR BLD CALC: 39.6 % — SIGNIFICANT CHANGE UP (ref 34.5–45)
HGB BLD-MCNC: 11 G/DL — LOW (ref 11.5–15.5)
HOROWITZ INDEX BLDA+IHG-RTO: 21 — SIGNIFICANT CHANGE UP
HOROWITZ INDEX BLDA+IHG-RTO: 28 — SIGNIFICANT CHANGE UP
HOROWITZ INDEX BLDA+IHG-RTO: 40 — SIGNIFICANT CHANGE UP
HYALINE CASTS # UR AUTO: ABNORMAL /LPF
IMM GRANULOCYTES NFR BLD AUTO: 0.4 % — SIGNIFICANT CHANGE UP (ref 0–1.5)
INR BLD: 1.65 RATIO — HIGH (ref 0.88–1.16)
KETONES UR-MCNC: ABNORMAL
LACTATE SERPL-SCNC: 0.8 MMOL/L — SIGNIFICANT CHANGE UP (ref 0.7–2)
LACTATE SERPL-SCNC: 1.7 MMOL/L — SIGNIFICANT CHANGE UP (ref 0.7–2)
LEUKOCYTE ESTERASE UR-ACNC: ABNORMAL
LYMPHOCYTES # BLD AUTO: 13.1 % — SIGNIFICANT CHANGE UP (ref 13–44)
LYMPHOCYTES # BLD AUTO: 2.1 K/UL — SIGNIFICANT CHANGE UP (ref 1–3.3)
MCHC RBC-ENTMCNC: 26.5 PG — LOW (ref 27–34)
MCHC RBC-ENTMCNC: 27.8 GM/DL — LOW (ref 32–36)
MCV RBC AUTO: 95.4 FL — SIGNIFICANT CHANGE UP (ref 80–100)
MONOCYTES # BLD AUTO: 1.03 K/UL — HIGH (ref 0–0.9)
MONOCYTES NFR BLD AUTO: 6.4 % — SIGNIFICANT CHANGE UP (ref 2–14)
NEUTROPHILS # BLD AUTO: 12.8 K/UL — HIGH (ref 1.8–7.4)
NEUTROPHILS NFR BLD AUTO: 79.9 % — HIGH (ref 43–77)
NITRITE UR-MCNC: NEGATIVE — SIGNIFICANT CHANGE UP
NRBC # BLD: 0 /100 WBCS — SIGNIFICANT CHANGE UP (ref 0–0)
NT-PROBNP SERPL-SCNC: 1176 PG/ML — HIGH (ref 0–450)
OB PNL STL: NEGATIVE — SIGNIFICANT CHANGE UP
PCO2 BLDA: 53 MMHG — HIGH (ref 32–46)
PCO2 BLDA: 56 MMHG — HIGH (ref 32–46)
PCO2 BLDA: 57 MMHG — HIGH (ref 32–46)
PH BLD: 7.34 — LOW (ref 7.35–7.45)
PH BLD: 7.36 — SIGNIFICANT CHANGE UP (ref 7.35–7.45)
PH BLD: 7.39 — SIGNIFICANT CHANGE UP (ref 7.35–7.45)
PH UR: 5 — SIGNIFICANT CHANGE UP (ref 5–8)
PLATELET # BLD AUTO: 168 K/UL — SIGNIFICANT CHANGE UP (ref 150–400)
PO2 BLDA: 85 MMHG — SIGNIFICANT CHANGE UP (ref 74–108)
PO2 BLDA: 87 MMHG — SIGNIFICANT CHANGE UP (ref 74–108)
PO2 BLDA: 87 MMHG — SIGNIFICANT CHANGE UP (ref 74–108)
POTASSIUM SERPL-MCNC: 3.4 MMOL/L — LOW (ref 3.5–5.3)
POTASSIUM SERPL-MCNC: 3.5 MMOL/L — SIGNIFICANT CHANGE UP (ref 3.5–5.3)
POTASSIUM SERPL-SCNC: 3.4 MMOL/L — LOW (ref 3.5–5.3)
POTASSIUM SERPL-SCNC: 3.5 MMOL/L — SIGNIFICANT CHANGE UP (ref 3.5–5.3)
PROT SERPL-MCNC: 7.1 GM/DL — SIGNIFICANT CHANGE UP (ref 6–8.3)
PROT SERPL-MCNC: 7.6 GM/DL — SIGNIFICANT CHANGE UP (ref 6–8.3)
PROT UR-MCNC: 30 MG/DL
PROTHROM AB SERPL-ACNC: 18.2 SEC — HIGH (ref 9.8–12.7)
RBC # BLD: 4.15 M/UL — SIGNIFICANT CHANGE UP (ref 3.8–5.2)
RBC # FLD: 20.4 % — HIGH (ref 10.3–14.5)
RBC CASTS # UR COMP ASSIST: ABNORMAL /HPF (ref 0–4)
SAO2 % BLDA: 96 % — SIGNIFICANT CHANGE UP (ref 92–96)
SAO2 % BLDA: 96 % — SIGNIFICANT CHANGE UP (ref 92–96)
SAO2 % BLDA: SIGNIFICANT CHANGE UP % (ref 92–96)
SODIUM SERPL-SCNC: 157 MMOL/L — HIGH (ref 135–145)
SODIUM SERPL-SCNC: 157 MMOL/L — HIGH (ref 135–145)
SP GR SPEC: 1.02 — SIGNIFICANT CHANGE UP (ref 1.01–1.02)
TROPONIN I SERPL-MCNC: 0.05 NG/ML — HIGH (ref 0.01–0.04)
TROPONIN I SERPL-MCNC: 0.07 NG/ML — HIGH (ref 0.01–0.04)
UROBILINOGEN FLD QL: 4 MG/DL
WBC # BLD: 16.02 K/UL — HIGH (ref 3.8–10.5)
WBC # FLD AUTO: 16.02 K/UL — HIGH (ref 3.8–10.5)
WBC UR QL: SIGNIFICANT CHANGE UP

## 2018-06-28 PROCEDURE — 71045 X-RAY EXAM CHEST 1 VIEW: CPT | Mod: 26

## 2018-06-28 PROCEDURE — 99223 1ST HOSP IP/OBS HIGH 75: CPT

## 2018-06-28 PROCEDURE — 70450 CT HEAD/BRAIN W/O DYE: CPT | Mod: 26

## 2018-06-28 PROCEDURE — 99291 CRITICAL CARE FIRST HOUR: CPT

## 2018-06-28 RX ORDER — SODIUM CHLORIDE 9 MG/ML
1000 INJECTION, SOLUTION INTRAVENOUS
Qty: 0 | Refills: 0 | Status: DISCONTINUED | OUTPATIENT
Start: 2018-06-28 | End: 2018-06-29

## 2018-06-28 RX ORDER — SODIUM CHLORIDE 9 MG/ML
1000 INJECTION INTRAMUSCULAR; INTRAVENOUS; SUBCUTANEOUS
Qty: 0 | Refills: 0 | Status: DISCONTINUED | OUTPATIENT
Start: 2018-06-28 | End: 2018-06-28

## 2018-06-28 RX ORDER — PHYTONADIONE (VIT K1) 5 MG
5 TABLET ORAL ONCE
Qty: 0 | Refills: 0 | Status: COMPLETED | OUTPATIENT
Start: 2018-06-28 | End: 2018-06-28

## 2018-06-28 RX ORDER — QUETIAPINE FUMARATE 200 MG/1
25 TABLET, FILM COATED ORAL AT BEDTIME
Qty: 0 | Refills: 0 | Status: DISCONTINUED | OUTPATIENT
Start: 2018-06-28 | End: 2018-07-07

## 2018-06-28 RX ORDER — PIPERACILLIN AND TAZOBACTAM 4; .5 G/20ML; G/20ML
3.38 INJECTION, POWDER, LYOPHILIZED, FOR SOLUTION INTRAVENOUS EVERY 12 HOURS
Qty: 0 | Refills: 0 | Status: DISCONTINUED | OUTPATIENT
Start: 2018-06-28 | End: 2018-07-03

## 2018-06-28 RX ORDER — SODIUM CHLORIDE 9 MG/ML
1000 INJECTION, SOLUTION INTRAVENOUS
Qty: 0 | Refills: 0 | Status: DISCONTINUED | OUTPATIENT
Start: 2018-06-28 | End: 2018-06-28

## 2018-06-28 RX ORDER — PANTOPRAZOLE SODIUM 20 MG/1
40 TABLET, DELAYED RELEASE ORAL DAILY
Qty: 0 | Refills: 0 | Status: DISCONTINUED | OUTPATIENT
Start: 2018-06-28 | End: 2018-07-03

## 2018-06-28 RX ORDER — MAGNESIUM SULFATE 500 MG/ML
2 VIAL (ML) INJECTION ONCE
Qty: 0 | Refills: 0 | Status: COMPLETED | OUTPATIENT
Start: 2018-06-28 | End: 2018-06-28

## 2018-06-28 RX ORDER — SODIUM CHLORIDE 9 MG/ML
500 INJECTION INTRAMUSCULAR; INTRAVENOUS; SUBCUTANEOUS ONCE
Qty: 0 | Refills: 0 | Status: COMPLETED | OUTPATIENT
Start: 2018-06-28 | End: 2018-06-28

## 2018-06-28 RX ORDER — IPRATROPIUM/ALBUTEROL SULFATE 18-103MCG
3 AEROSOL WITH ADAPTER (GRAM) INHALATION EVERY 6 HOURS
Qty: 0 | Refills: 0 | Status: DISCONTINUED | OUTPATIENT
Start: 2018-06-28 | End: 2018-07-07

## 2018-06-28 RX ADMIN — PIPERACILLIN AND TAZOBACTAM 25 GRAM(S): 4; .5 INJECTION, POWDER, LYOPHILIZED, FOR SOLUTION INTRAVENOUS at 23:25

## 2018-06-28 RX ADMIN — Medication 40 MILLIGRAM(S): at 07:32

## 2018-06-28 RX ADMIN — SODIUM CHLORIDE 500 MILLILITER(S): 9 INJECTION INTRAMUSCULAR; INTRAVENOUS; SUBCUTANEOUS at 03:58

## 2018-06-28 RX ADMIN — Medication 3 MILLILITER(S): at 05:11

## 2018-06-28 RX ADMIN — Medication 250 MILLIGRAM(S): at 02:57

## 2018-06-28 RX ADMIN — PIPERACILLIN AND TAZOBACTAM 25 GRAM(S): 4; .5 INJECTION, POWDER, LYOPHILIZED, FOR SOLUTION INTRAVENOUS at 12:49

## 2018-06-28 RX ADMIN — SODIUM CHLORIDE 1000 MILLILITER(S): 9 INJECTION INTRAMUSCULAR; INTRAVENOUS; SUBCUTANEOUS at 00:24

## 2018-06-28 RX ADMIN — PIPERACILLIN AND TAZOBACTAM 200 GRAM(S): 4; .5 INJECTION, POWDER, LYOPHILIZED, FOR SOLUTION INTRAVENOUS at 00:28

## 2018-06-28 RX ADMIN — SODIUM CHLORIDE 100 MILLILITER(S): 9 INJECTION, SOLUTION INTRAVENOUS at 10:14

## 2018-06-28 RX ADMIN — Medication 101 MILLIGRAM(S): at 01:51

## 2018-06-28 RX ADMIN — Medication 650 MILLIGRAM(S): at 00:04

## 2018-06-28 RX ADMIN — Medication 3 MILLILITER(S): at 11:45

## 2018-06-28 RX ADMIN — PANTOPRAZOLE SODIUM 40 MILLIGRAM(S): 20 TABLET, DELAYED RELEASE ORAL at 11:19

## 2018-06-28 RX ADMIN — Medication 3 MILLILITER(S): at 17:15

## 2018-06-28 RX ADMIN — Medication 50 GRAM(S): at 04:01

## 2018-06-28 RX ADMIN — Medication 40 MILLIGRAM(S): at 23:23

## 2018-06-28 RX ADMIN — Medication 40 MILLIGRAM(S): at 17:04

## 2018-06-28 RX ADMIN — SODIUM CHLORIDE 80 MILLILITER(S): 9 INJECTION INTRAMUSCULAR; INTRAVENOUS; SUBCUTANEOUS at 05:56

## 2018-06-28 NOTE — ED PROVIDER NOTE - OBJECTIVE STATEMENT
Pertinent PMH/PSH/FHx/SHx and Review of Systems contained within:  89 yo f with PMH of Dementia nonverbal and chair bound, 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, HTN, CHF and GERD referred to ED by pmd for supratherapeutic INR. As per family, patient also with fevers, lethargy and poor oral intake last few days. No aggravating or relieving factors, No /chills, No photophobia/eye pain/changes in vision, No ear pain/sore throat/dysphagia, No chest pain/palpitations, no SOB/cough/wheeze/stridor, No abdominal pain, No N/V/D, no dysuria/frequency/discharge, No neck/back pain, no rash, no changes in neurological status/function.

## 2018-06-28 NOTE — H&P ADULT - HISTORY OF PRESENT ILLNESS
89 y/o female PMH of Dementia, nonverbal and chair bound, hx  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, HTN, CHF, s/p aortic valve replacement, COPD and GERD referred to ED by pmd for supra therapeutic INR. As per family, patient also with fevers, lethargy and poor oral intake last few days. No hematemesis, melena or rectal bleeding. According to family some SOB, +/- cough, no CP, no abd pain, N/V, dysuria, hematuria,  headache, neck pain, rash. Pt non verbal.

## 2018-06-28 NOTE — H&P ADULT - NSHPLABSRESULTS_GEN_ALL_CORE
LABS:                        12.5   18.54 )-----------( 230      ( 2018 22:58 )             44.8           PT/INR - ( 2018 22:58 )   PT: 167.6 sec;   INR: 14.56 ratio         PTT - ( 2018 22:58 )  PTT:68.3 sec  Urinalysis Basic - ( 2018 23:58 )    Color: Yellow / Appearance: Clear / S.020 / pH: x  Gluc: x / Ketone: Trace  / Bili: Moderate / Urobili: 4 mg/dL   Blood: x / Protein: 30 mg/dL / Nitrite: Negative   Leuk Esterase: Small / RBC: 3-5 /HPF / WBC 3-5   Sq Epi: x / Non Sq Epi: Moderate / Bacteria: x    Lactate, Blood: 2.3 mmol/L (18 @ 22:58)    CAPILLARY BLOOD GLUCOSE  CMP, troponin, ABG pending              RADIOLOGY & ADDITIONAL TESTS:  < from: CT Head No Cont (18 @ 00:30) >    No acute intracranial hemorrhage, mass effect, or CT evidence of a large   vascular territory infarct. Atrophy and chronic microvascular ischemic   gliotic changes, mildly progressed compared to the prior study. If   clinically indicated, short-term follow-up or MRI may be obtained for   further evaluation provided no MR contraindications.    CXR: infiltrate right base    Imaging Personally Reviewed:  [x ] YES  [ ] NO  EKG: sinus@ 91 non specific ST T changes

## 2018-06-28 NOTE — H&P ADULT - NSHPPHYSICALEXAM_GEN_ALL_CORE
T(C): 37.7 (27 Jun 2018 21:48), Max: 37.7 (27 Jun 2018 21:48)  T(F): 99.9 (27 Jun 2018 21:48), Max: 99.9 (27 Jun 2018 21:48)  HR: 117 (27 Jun 2018 21:48) (117 - 117)  BP: 129/58 (27 Jun 2018 21:48) (129/58 - 129/58)  BP(mean): --  RR: 17 (27 Jun 2018 21:48) (17 - 17)  SpO2: 86% (27 Jun 2018 21:48) (86% - 86%)    PHYSICAL EXAM:  GENERAL: NAD, well-groomed, well-developed  HEAD:  Atraumatic, Normocephalic  EYES: EOMI, PERRLA, conjunctiva and sclera clear  ENMT: No tonsillar erythema, exudates, or enlargement; dry mucous membranes,  No lesions  NECK: Supple, No JVD, Normal thyroid  NERVOUS SYSTEM:  Alert non verbal, does not follow commands, CN 2-12 intact; no focal deficits  CHEST/LUNG:  rales right base, no rhonchi, wheezing, or rubs  HEART: Regular rate and rhythm; No murmurs, rubs, or gallops  ABDOMEN: Soft, Nontender, Nondistended; Bowel sounds present  RECTAL: heme negative  EXTREMITIES:  + Peripheral Pulses, No clubbing, cyanosis, or edema  LYMPH: No lymphadenopathy noted  SKIN: No rashes or lesions

## 2018-06-28 NOTE — H&P ADULT - ASSESSMENT
89 y/o female PMH of Dementia, nonverbal and chair bound, hx  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, HTN, CHF, s/p aortic valve replacement, COPD and GERD referred to ED by pmd for supra therapeutic INR. Pt was having fevers, SOB and cough. Exam + for rales right base, pt has leukocytosis with left shift, lactate is 2.3, CXR shows infiltrate right base. ED physician administered vitamin K in presence INR 14.56.  IMPROVE VTE Individual Risk Assessment          RISK                                                          Points    [  ] Previous VTE                                                3    [x  ] Thrombophilia                                             2    [  ] Lower limb paralysis                                    2        (unable to hold up >15 seconds)      [  ] Current Cancer                                             2         (within 6 months)    [  ] Immobilization > 24 hrs                              1    [  ] ICU/CCU stay > 24 hours                            1    [ x ] Age > 60                                                    1    IMPROVE VTE Score ____3_____

## 2018-06-28 NOTE — ED PROVIDER NOTE - CARE PLAN
Principal Discharge DX:	Pneumonia of right middle lobe due to infectious organism  Secondary Diagnosis:	Supratherapeutic INR

## 2018-06-28 NOTE — PROGRESS NOTE ADULT - SUBJECTIVE AND OBJECTIVE BOX
Hospitalist follow-up for multiple medical problems (PNA, acute respiratory failure, acute renal failure, supra-therapeutic INR on warfarin, acute encephalopathy).  Patient admitted earlier this morning with elevated INR of 14, given Vitamin K IV.  No signs of bleeding.  CT head without acute changes.  Patient with baseline dementia, cared for by family at home.  Given Zosyn and Vancomycin for pneumonia.  Also found to have elevated serum sodium and creatinine.  Overnight events review with staff, patient more hypoxic and placed on BIPAP, unable to remove due to hypoxia.  Hypotensive and received appx. 2.5 liters, now 80ml/hr.    VS:  SBPs   HR 60-70  RR 20s  oxygen saturation 94% on BIPAP with FiO2 40  Lungs with diminished breath sound.  CVS regular rhythm.  Abdomen soft, NT, not distended, diminished bowel sounds.  No signs of peripheral edema.    1.  Acute encephalopathy - most likely metabolic.  Continue supportive care.  NPO until passes swallow evaluation.  Change all essential medications to IV form.    2.  Acute hypoxic respiratory failure - pneumonia, history of COPD and PEs.  Continue BIPAP.  Nebulizers and steroids.  Repeat ABG STAT.  Repeat CXR.    3.  Acute renal failure - most likely volume depletion and sepsis.  Place Shepherd for urine output.  Avoid nephrotoxins.  Dose all medications for reduced eGFR.    4.  Pneumonia/sepsis.  Broad spectrum antibiotics.  Follow-up cultures.  De-escalate antibiotics as needed.    5.  Hypernatremia.  Hypotonic IVF.    6.  Coagulopathy - supra-therapeutic INR on warfarin.  Hold warfarin.  Repeat INR.    7.  Elevated troponin - likely demand ischemia.  Check repeat troponin.    8.  Hypotension.  Bolus fluids as needed.  Monitor for CHF due to history.  Repeat CXR.    Called and updated patient's daughter/emergency contact, Tania Ye.  Advised her of patient's critical status.  Discussed advanced directives and she wants FULL CODE for now.  Consult ICU.  Critical care time spent was 40 minutes.

## 2018-06-28 NOTE — CONSULT NOTE ADULT - ASSESSMENT
89 y/o female PMH of Dementia, nonverbal and chair bound, hx  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, HTN, CHF, s/p aortic valve replacement, COPD and GERD referred to ED by pmd for supra therapeutic INR. ICU c/s called for borderline hypotension with SBP in 90's, overall worsening mental status in the setting of sepsis/PNA vs.  coagulapathy with INR of 14, SOB/respiratory distress now on BiPAP r/o CHF exacerbation and COPD exacerbation. 89 y/o female PMH of Dementia, nonverbal and chair bound, hx  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, HTN, CHF, s/p aortic valve replacement, COPD and GERD referred to ED by pmd for supra therapeutic INR. ICU c/s called for borderline hypotension with SBP in 90's, overall worsening mental status in the setting of sepsis/PNA vs.  coagulapathy with INR of 14, SOB/respiratory distress now on BiPAP r/o CHF exacerbation and COPD exacerbation.       Plan    1.  SOB/respiratory distress ?sepsis/PNA,now on BiPAP r/o CHF exacerbation and COPD exacerbation  -Continue with BIPAP, ABG stable, pt noted to be chronic retainer with ABG 7.36/56/85/30/95%  -CXR noted to have BL patchy opacities, pt meeting sepsis criteria with p/w low grade fever and leukocytosis  -SOB and tachypnea improved with BiPAP  -Continue BS ABX 89 y/o female PMH of Dementia, nonverbal and chair bound, hx  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, HTN, CHF, s/p aortic valve replacement, COPD and GERD referred to ED by pmd for supra therapeutic INR. ICU c/s called for borderline hypotension with SBP in 90's, overall worsening mental status in the setting of sepsis/PNA vs.  coagulapathy with INR of 14, SOB/respiratory distress now on BiPAP r/o CHF exacerbation and COPD exacerbation.       Plan    1.  SOB/respiratory distress ?sepsis/PNA,now on BiPAP r/o CHF exacerbation and COPD exacerbation  -Continue with BIPAP, ABG stable, pt noted to be chronic retainer with ABG 7.36/56/85/30/95%  -CXR noted to have BL patchy opacities, pt meeting sepsis criteria with p/w low grade fever and leukocytosis  -SOB and tachypnea improved with BiPAP  -Continue BS ABX     2.  Coagulopathy with INR of 14 likely 2/2 to medication induced with coumadin   -Trend coags PT/INR   -May require additional Vitmin K

## 2018-06-28 NOTE — CONSULT NOTE ADULT - ASSESSMENT
[facility]  [phone] 9/13/1926   ALLERGY Haldol   CONTACT Dixie Alfaro/Janice Duke [phone] self  Dtr Tania Arndt [phone]   REFERRING MD Dr Antony Hsu   REASON FOR VISIT   Initial evaluation/Pulmonary consultation requested   by Dr from Dr Lobo   Patient examined chart reviewed  HOSPITAL ADMISSION [facility] 6/28/2018    PATIENT CAME  FROM (if information available)      PAST MEDICAL SURGICAL HISTORY   UTI (urinary tract infection)  PE (pulmonary thromboembolism)  Ovarian cancer  Alzheimer's Dementia with Behavioral Disturbance  Benign Essential Hypertension  H/O aortic valve replacement  Sigmoidoscopy Exam - Negative  S/P [facility] Biopsy of Cervix with vag laceration repair  S/P D&C, operative hysteroscopy      PATIENT DESCRIPTION 6/28/2018 ADMISSION [facility]     89 y/o female PMH of Dementia, nonverbal and chair bound, hx  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, HTN, CHF, s/p aortic valve replacement, COPD and GERD referred to ED by pmd for supra therapeutic INR. As per family, patient also with fevers, lethargy and poor oral intake last few days. No hematemesis, melena or rectal bleeding.    HOSPITAL COURSE 6/28/2018 ADMISSION [facility]   On arrival pt was found to have CO2 retn and was placed on BPAP Her INR was supratherapeutic but repeat one several hr later came down to 1.65   Pt was started empirically on zosyn for poss asp pneum    IVF  1/2  (6/28)     VITALS/LABS                           6/28/2018 afeb 50 101/76 18   6/28/2018 11a bpap 12/6/.4   6/28/2018 W 16 Hb 11 lt 168 Na 157 K 3.5 CO2 30 Cr 3.1       REVIEW OF SYMPTOMS    Able to give ROS  NO     PHYSICAL EXAM    HEENT Unremarkable PERRLA atraumatic   RESP Fair air entry EXP prolonged    Harsh breath sound Resp distres mild   CARDIAC S1 S2 No S3     NO JVD    ABDOMEN SOFT BS PRESENT NOT DISTENDED No hepatosplenomegaly PEDAL EDEMA present No calf tenderness  NO rash     ASSESSMENT RECOMMENDATIONS  6/28/2018 ADMISSION [facility]                         RESP   6/28/2018 2p bpap 12/6/.4 734/57/84   6/28 2A .28 739/53/87   6/28/2018 Pt has alveolar hypoventilation but gas excange is acceptable Cont BPAP if tolerated to decrease work of breathing   COPD  Duoneb.4 (6/28)   Solumed 40.3 (6/28)   6/28/2018 Will decrease solumed to 40/d on 6/29  SEPSIS UTI   6/27 UA 1020 Mod blood cells R 3-5 W 3-5   SEPSIS PNEUMONIA   6/28/2018 CXR Faint basal patchy opac   6/28/2018 Pt started on zosyn empirically (6/28/2018)    CAD  6/28 Tr 1 .069 (i)   6/28/2018 Monitor enz   LACTICEMIA   6/27-6/28 LA 2.3-1.7  On IVF Monitor   COAGULOPATHY   6/27 10p - 6/28 12p INR14.5 - 1.65   6/28/2018 Improved to find out reason for coumadin  mech valve v af   HYPERNATREMIA   6/28 Na 157   6/28/2018 On 1/2 NS May need ngt for fw May need to change to  IV D5   SUSAN  6/28 Cr 3.1   6/28/2018 Monitor lytes   AMS   6/28/2018 CT head n   AGITATION  Quetiapine 25 (6/28)     GLOBAL ISSUE/BEST PRACTICE:      PROBLEM: HOB elevation:   y            PROBLEM: Stress ulcer proph:    protonix 40 (6/28)                       PROBLEM: VTE prophylaxis:      NA  PROBLEM: Glycemic control:    na  PROBLEM: Nutrition:    npo (6/28)   PROBLEM: Advanced directive: na     PROBLEM: Allergies:  na      TIME SPENT Over 55 minutes aggregate care time spent on encounter; activities included   direct patient care, counseling and/or coordinating care reviewing notes, lab data/ imaging , discussion with multidisciplinary team/ patient  /family

## 2018-06-28 NOTE — PROVIDER CONTACT NOTE (CRITICAL VALUE NOTIFICATION) - SITUATION
Troponin level 0.069  Dr. Malik notified. Patient was given Vit. K earlier  For high INR. As per MD he will repeat troponin

## 2018-06-28 NOTE — CONSULT NOTE ADULT - SUBJECTIVE AND OBJECTIVE BOX
Patient is a 89y old  Female who presents with a chief complaint of Sent by MD for elevated INR. (2018 02:29)      HPI:  89 y/o female PMH of Dementia, nonverbal and chair bound, hx  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, HTN, CHF, s/p aortic valve replacement, COPD and GERD referred to ED by pmd for supra therapeutic INR. As per family, patient also with fevers, lethargy and poor oral intake last few days. No hematemesis, melena or rectal bleeding. According to family some SOB, +/- cough, no CP, no abd pain, N/V, dysuria, hematuria,  headache, neck pain, rash. Pt non verbal. (2018 02:29)    ICU c/s called for borderline hypotension with SBP in 90's, overall worsening mental status, coagulapathy with INR of 14, SOB now on BiPAP.  Pt was examined and assessed at the bedside, pt noted to be lethargic, but arousable to painful stimulus (baseline mental status unknown in the setting of dementia), breathing non-labored and in no obvious distress in BIPAP.  Pt's vital signs stable with SpO2 97% on BiPAP and -120's.  ABG stable 7.36/56/85/30/95%. Pt in no obvious respiratory distress and remain hemodynamically stable at this time.  ICU care not needed at this time.  Will reevaluate in the setting of acute change     Allergies    Haldol (Unknown)    MEDICATIONS  (STANDING):  ALBUTerol/ipratropium for Nebulization 3 milliLiter(s) Nebulizer every 6 hours  methylPREDNISolone sodium succinate Injectable 40 milliGRAM(s) IV Push every 8 hours  pantoprazole  Injectable 40 milliGRAM(s) IV Push daily  piperacillin/tazobactam IVPB. 3.375 Gram(s) IV Intermittent every 12 hours  QUEtiapine 25 milliGRAM(s) Oral at bedtime  sodium chloride 0.45%. 1000 milliLiter(s) (100 mL/Hr) IV Continuous <Continuous>    MEDICATIONS  (PRN):      Daily     Daily Weight in k.8 (2018 10:13)    Drug Dosing Weight  Height (cm): 147.32 (2018 00:03)  Weight (kg): 49.8 (2018 10:13)  BMI (kg/m2): 22.9 (2018 10:13)  BSA (m2): 1.41 (2018 10:13)    PAST MEDICAL & SURGICAL HISTORY:  UTI (urinary tract infection)  PE (pulmonary thromboembolism)  Ovarian cancer  Alzheimer's Dementia with Behavioral Disturbance  Benign Essential Hypertension  H/O aortic valve replacement  Sigmoidoscopy Exam - Negative  S/P Cone Biopsy of Cervix with vag laceration repair  S/P D&C, operative hysteroscopy      FAMILY HISTORY:  No pertinent family history in first degree relatives      SOCIAL HISTORY:    ADVANCE DIRECTIVES: full code    REVIEW OF SYSTEMS:    Pt non-verbal and not able to give a reliable history at this time    ICU Vital Signs Last 24 Hrs  T(C): 36.8 (2018 10:13), Max: 37.7 (2018 21:48)  T(F): 98.2 (2018 10:13), Max: 99.9 (2018 21:48)  HR: 54 (2018 11:45) (54 - 117)  BP: 93/52 (2018 10:13) (93/52 - 129/58)  BP(mean): --  ABP: --  ABP(mean): --  RR: 16 (2018 10:13) (15 - 20)  SpO2: 97% (2018 11:45) (86% - 99%)      ABG - ( 2018 02:48 )  pH, Arterial: x     pH, Blood: 7.39  /  pCO2: 53    /  pO2: 87    / HCO3: 31    / Base Excess: 5.6   /  SaO2: 96          I&O's Detail      PHYSICAL EXAM:    GENERAL: NAD, mildly lethargic, withdraws and responds to pain   EYES: EOMI, PERRLA, conjunctiva and sclera clear  NERVOUS SYSTEM:  lethargic, withdraws and responds to pain Motor Strength 5/5 B/L upper and lower extremities;  CHEST/LUNG: CTA BL  No rales, rhonchi, wheezing, or rubs  HEART: Regular rate and rhythm; No murmurs, rubs, or gallops  ABDOMEN: Soft, Nontender, Nondistended; Bowel sounds present  EXTREMITIES:  2+ Peripheral Pulses, No clubbing, cyanosis, or edema      LABS:  CBC Full  -  ( 2018 05:22 )  WBC Count : 16.02 K/uL  Hemoglobin : 11.0 g/dL  Hematocrit : 39.6 %  Platelet Count - Automated : 168 K/uL  Mean Cell Volume : 95.4 fl  Mean Cell Hemoglobin : 26.5 pg  Mean Cell Hemoglobin Concentration : 27.8 gm/dL  Auto Neutrophil # : 12.80 K/uL  Auto Lymphocyte # : 2.10 K/uL  Auto Monocyte # : 1.03 K/uL  Auto Eosinophil # : 0.00 K/uL  Auto Basophil # : 0.03 K/uL  Auto Neutrophil % : 79.9 %  Auto Lymphocyte % : 13.1 %  Auto Monocyte % : 6.4 %  Auto Eosinophil % : 0.0 %  Auto Basophil % : 0.2 %        157<H>  |  118<H>  |  79<H>  ----------------------------<  153<H>  3.5   |  30  |  3.15<H>    Ca    8.3<L>      2018 05:22    TPro  7.6  /  Alb  2.7<L>  /  TBili  0.7  /  DBili  x   /  AST  15  /  ALT  10<L>  /  AlkPhos  67      CAPILLARY BLOOD GLUCOSE        PT/INR - ( 2018 22:58 )   PT: 167.6 sec;   INR: 14.56 ratio         PTT - ( 2018 22:58 )  PTT:68.3 sec  Urinalysis Basic - ( 2018 23:58 )    Color: Yellow / Appearance: Clear / S.020 / pH: x  Gluc: x / Ketone: Trace  / Bili: Moderate / Urobili: 4 mg/dL   Blood: x / Protein: 30 mg/dL / Nitrite: Negative   Leuk Esterase: Small / RBC: 3-5 /HPF / WBC 3-5   Sq Epi: x / Non Sq Epi: Moderate / Bacteria: x      CARDIAC MARKERS ( 2018 05:01 )  .069 ng/mL / x     / 113 U/L / x     / 1.0 ng/mL      EKG:    ECHO, US:    RADIOLOGY:  < from: Xray Chest 1 View-PORTABLE IMMEDIATE (18 @ 10:33) >    IMPRESSION: Faint bibasilar patchy opacities, slightly improved.   2018.        < end of copied text >      CRITICAL CARE TIME SPENT:

## 2018-06-28 NOTE — ED PROVIDER NOTE - MEDICAL DECISION MAKING DETAILS
Labs and imaging reviewed. patient received gentle hydration, abx and vitamin K. patient now admitted to medicine for further management.

## 2018-06-28 NOTE — ED PROVIDER NOTE - PSH
H/O aortic valve replacement    S/P [facility] Biopsy of Cervix with vag laceration repair    S/P D&C, operative hysteroscopy    Sigmoidoscopy Exam - Negative

## 2018-06-28 NOTE — CONSULT NOTE ADULT - ATTENDING COMMENTS
Pt seen and examined with PA on medical floor    89F PMH dementia, non verbal and chair bound at baseline, PE on coumadin, HTN, CHF with diastolic dysfunction, s/p aortic valve replacement, COPD, GERD sent to ER for evaluation for supra therapeutic INR. Family also reports pt to have been febrile, lethargic with decreased po intake and SOB. Asked to evaluate pt for hypotension SBP 90s, persistent lethargy on BiPAP.     - pt oxygenating well on BiPAP  - withdraws to tactile stimuli  - ABG 7.35/56/85/30/95% on BiPAP which is reasonable, if repeat ABG with increasing pCO2 would recommend increasing IPAP  - pulmonary follow patient  - BP at present time s/p 1.5L IVF bolus total since admission is now stable with -130s  - INR repeated s/p vit K 5mg is 1.65 down from 14 with no evidence of active bleeding  - though pt remains lethargic she is protecting her airway, would cont BiPAP  - goals of care discussion with family  - at present time does not require ICU admission  - d/w Dr Hsu

## 2018-06-28 NOTE — CONSULT NOTE ADULT - SUBJECTIVE AND OBJECTIVE BOX
Chart reviewed Detailed consult to follow Chart reviewed Detailed consult to follow    Pl see assessment

## 2018-06-29 LAB
ALBUMIN SERPL ELPH-MCNC: 2.7 G/DL — LOW (ref 3.3–5)
ALP SERPL-CCNC: 57 U/L — SIGNIFICANT CHANGE UP (ref 40–120)
ALT FLD-CCNC: 6 U/L — LOW (ref 12–78)
ANION GAP SERPL CALC-SCNC: 11 MMOL/L — SIGNIFICANT CHANGE UP (ref 5–17)
ANION GAP SERPL CALC-SCNC: 6 MMOL/L — SIGNIFICANT CHANGE UP (ref 5–17)
ANION GAP SERPL CALC-SCNC: 7 MMOL/L — SIGNIFICANT CHANGE UP (ref 5–17)
AST SERPL-CCNC: 15 U/L — SIGNIFICANT CHANGE UP (ref 15–37)
BILIRUB SERPL-MCNC: 0.6 MG/DL — SIGNIFICANT CHANGE UP (ref 0.2–1.2)
BUN SERPL-MCNC: 60 MG/DL — HIGH (ref 7–23)
BUN SERPL-MCNC: 66 MG/DL — HIGH (ref 7–23)
BUN SERPL-MCNC: 68 MG/DL — HIGH (ref 7–23)
CALCIUM SERPL-MCNC: 8.6 MG/DL — SIGNIFICANT CHANGE UP (ref 8.5–10.1)
CALCIUM SERPL-MCNC: 8.7 MG/DL — SIGNIFICANT CHANGE UP (ref 8.5–10.1)
CALCIUM SERPL-MCNC: 9 MG/DL — SIGNIFICANT CHANGE UP (ref 8.5–10.1)
CHLORIDE SERPL-SCNC: 115 MMOL/L — HIGH (ref 96–108)
CHLORIDE SERPL-SCNC: 118 MMOL/L — HIGH (ref 96–108)
CHLORIDE SERPL-SCNC: 118 MMOL/L — HIGH (ref 96–108)
CK SERPL-CCNC: 78 U/L — SIGNIFICANT CHANGE UP (ref 26–192)
CO2 SERPL-SCNC: 30 MMOL/L — SIGNIFICANT CHANGE UP (ref 22–31)
CO2 SERPL-SCNC: 31 MMOL/L — SIGNIFICANT CHANGE UP (ref 22–31)
CO2 SERPL-SCNC: 32 MMOL/L — HIGH (ref 22–31)
CREAT SERPL-MCNC: 1.62 MG/DL — HIGH (ref 0.5–1.3)
CREAT SERPL-MCNC: 1.78 MG/DL — HIGH (ref 0.5–1.3)
CREAT SERPL-MCNC: 1.92 MG/DL — HIGH (ref 0.5–1.3)
GLUCOSE SERPL-MCNC: 108 MG/DL — HIGH (ref 70–99)
GLUCOSE SERPL-MCNC: 121 MG/DL — HIGH (ref 70–99)
GLUCOSE SERPL-MCNC: 128 MG/DL — HIGH (ref 70–99)
HCT VFR BLD CALC: 31.6 % — LOW (ref 34.5–45)
HGB BLD-MCNC: 8.9 G/DL — LOW (ref 11.5–15.5)
INR BLD: 1.11 RATIO — SIGNIFICANT CHANGE UP (ref 0.88–1.16)
LACTATE SERPL-SCNC: 1.9 MMOL/L — SIGNIFICANT CHANGE UP (ref 0.7–2)
MAGNESIUM SERPL-MCNC: 2.8 MG/DL — HIGH (ref 1.6–2.6)
MAGNESIUM SERPL-MCNC: 3 MG/DL — HIGH (ref 1.6–2.6)
MCHC RBC-ENTMCNC: 26.6 PG — LOW (ref 27–34)
MCHC RBC-ENTMCNC: 28.2 GM/DL — LOW (ref 32–36)
MCV RBC AUTO: 94.6 FL — SIGNIFICANT CHANGE UP (ref 80–100)
NRBC # BLD: 0 /100 WBCS — SIGNIFICANT CHANGE UP (ref 0–0)
PHOSPHATE SERPL-MCNC: 2.7 MG/DL — SIGNIFICANT CHANGE UP (ref 2.5–4.5)
PHOSPHATE SERPL-MCNC: 3.2 MG/DL — SIGNIFICANT CHANGE UP (ref 2.5–4.5)
PLATELET # BLD AUTO: 172 K/UL — SIGNIFICANT CHANGE UP (ref 150–400)
POTASSIUM SERPL-MCNC: 3.3 MMOL/L — LOW (ref 3.5–5.3)
POTASSIUM SERPL-MCNC: 3.5 MMOL/L — SIGNIFICANT CHANGE UP (ref 3.5–5.3)
POTASSIUM SERPL-MCNC: 3.6 MMOL/L — SIGNIFICANT CHANGE UP (ref 3.5–5.3)
POTASSIUM SERPL-SCNC: 3.3 MMOL/L — LOW (ref 3.5–5.3)
POTASSIUM SERPL-SCNC: 3.5 MMOL/L — SIGNIFICANT CHANGE UP (ref 3.5–5.3)
POTASSIUM SERPL-SCNC: 3.6 MMOL/L — SIGNIFICANT CHANGE UP (ref 3.5–5.3)
PROCALCITONIN SERPL-MCNC: 0.83 NG/ML — HIGH (ref 0.02–0.1)
PROT SERPL-MCNC: 7.1 GM/DL — SIGNIFICANT CHANGE UP (ref 6–8.3)
PROTHROM AB SERPL-ACNC: 12.1 SEC — SIGNIFICANT CHANGE UP (ref 9.8–12.7)
RBC # BLD: 3.34 M/UL — LOW (ref 3.8–5.2)
RBC # FLD: 19.9 % — HIGH (ref 10.3–14.5)
SODIUM SERPL-SCNC: 156 MMOL/L — HIGH (ref 135–145)
TROPONIN I SERPL-MCNC: 0.01 NG/ML — SIGNIFICANT CHANGE UP (ref 0.01–0.04)
WBC # BLD: 10.01 K/UL — SIGNIFICANT CHANGE UP (ref 3.8–10.5)
WBC # FLD AUTO: 10.01 K/UL — SIGNIFICANT CHANGE UP (ref 3.8–10.5)

## 2018-06-29 PROCEDURE — 71045 X-RAY EXAM CHEST 1 VIEW: CPT | Mod: 26

## 2018-06-29 PROCEDURE — 99233 SBSQ HOSP IP/OBS HIGH 50: CPT

## 2018-06-29 RX ORDER — HEPARIN SODIUM 5000 [USP'U]/ML
4000 INJECTION INTRAVENOUS; SUBCUTANEOUS ONCE
Qty: 0 | Refills: 0 | Status: COMPLETED | OUTPATIENT
Start: 2018-06-29 | End: 2018-06-29

## 2018-06-29 RX ORDER — HEPARIN SODIUM 5000 [USP'U]/ML
4000 INJECTION INTRAVENOUS; SUBCUTANEOUS EVERY 6 HOURS
Qty: 0 | Refills: 0 | Status: DISCONTINUED | OUTPATIENT
Start: 2018-06-29 | End: 2018-07-03

## 2018-06-29 RX ORDER — SODIUM CHLORIDE 9 MG/ML
1000 INJECTION, SOLUTION INTRAVENOUS
Qty: 0 | Refills: 0 | Status: DISCONTINUED | OUTPATIENT
Start: 2018-06-29 | End: 2018-07-07

## 2018-06-29 RX ORDER — POTASSIUM CHLORIDE 20 MEQ
10 PACKET (EA) ORAL
Qty: 0 | Refills: 0 | Status: COMPLETED | OUTPATIENT
Start: 2018-06-29 | End: 2018-06-29

## 2018-06-29 RX ORDER — HEPARIN SODIUM 5000 [USP'U]/ML
INJECTION INTRAVENOUS; SUBCUTANEOUS
Qty: 25000 | Refills: 0 | Status: DISCONTINUED | OUTPATIENT
Start: 2018-06-29 | End: 2018-07-03

## 2018-06-29 RX ORDER — HEPARIN SODIUM 5000 [USP'U]/ML
2000 INJECTION INTRAVENOUS; SUBCUTANEOUS EVERY 6 HOURS
Qty: 0 | Refills: 0 | Status: DISCONTINUED | OUTPATIENT
Start: 2018-06-29 | End: 2018-07-03

## 2018-06-29 RX ADMIN — Medication 3 MILLILITER(S): at 18:02

## 2018-06-29 RX ADMIN — Medication 3 MILLILITER(S): at 05:30

## 2018-06-29 RX ADMIN — Medication 40 MILLIGRAM(S): at 06:07

## 2018-06-29 RX ADMIN — Medication 100 MILLIEQUIVALENT(S): at 17:44

## 2018-06-29 RX ADMIN — PIPERACILLIN AND TAZOBACTAM 25 GRAM(S): 4; .5 INJECTION, POWDER, LYOPHILIZED, FOR SOLUTION INTRAVENOUS at 12:34

## 2018-06-29 RX ADMIN — SODIUM CHLORIDE 100 MILLILITER(S): 9 INJECTION, SOLUTION INTRAVENOUS at 06:24

## 2018-06-29 RX ADMIN — Medication 100 MILLIEQUIVALENT(S): at 22:37

## 2018-06-29 RX ADMIN — Medication 3 MILLILITER(S): at 11:37

## 2018-06-29 RX ADMIN — Medication 3 MILLILITER(S): at 00:02

## 2018-06-29 RX ADMIN — PANTOPRAZOLE SODIUM 40 MILLIGRAM(S): 20 TABLET, DELAYED RELEASE ORAL at 11:45

## 2018-06-29 RX ADMIN — Medication 100 MILLIEQUIVALENT(S): at 20:37

## 2018-06-29 RX ADMIN — HEPARIN SODIUM 4000 UNIT(S): 5000 INJECTION INTRAVENOUS; SUBCUTANEOUS at 12:33

## 2018-06-29 RX ADMIN — HEPARIN SODIUM 900 UNIT(S)/HR: 5000 INJECTION INTRAVENOUS; SUBCUTANEOUS at 12:33

## 2018-06-29 RX ADMIN — Medication 3 MILLILITER(S): at 23:33

## 2018-06-29 RX ADMIN — QUETIAPINE FUMARATE 25 MILLIGRAM(S): 200 TABLET, FILM COATED ORAL at 22:43

## 2018-06-29 NOTE — PROGRESS NOTE ADULT - ASSESSMENT
1.  Acute encephalopathy superimposed on dementia - improved, most likely metabolic.  Swallow evaluation if can tolerate off BIPAP mask.  Avoid medications with CNS side effects.  Resume outpatient dementia medications when able.    2.  Acute respiratory failure - in setting of pneumonia and sepsis.  Pulmonology consult appreciated.  Taper IV steroids as tolerated.  Continue Zosyn.  Taper BIPAP as tolerated.    3.  Acute renal failure - improving ATN from sepsis dehydration.  Continue IVF.  Remove Shepherd for voiding trial.  Avoid nephrotoxins.  Dose all medications for reduced eGFR.    4.  Coagulopathy - INR normal now.  On warfarin for history of PE.  Granddaughter (RN) states AVR is not mechanical but will check with other family members (unable to contact daughter).  Start IV heparin until tolerates oral.  Resume warfarin if tolerates oral.    5.  Hypernatremia.  D5W started overnight.  Will repeat serum sodium today.    I called and updated patient's emergency contact/grand-daughter on patient's condition and plan of care.

## 2018-06-29 NOTE — SWALLOW BEDSIDE ASSESSMENT ADULT - NS SPL SWALLOW CLINIC TRIAL FT
Pt with persistent bolus hold in mouth of water that was removed manually. She did not demo this behavior with juice trials. There were 3 water trials that she did swallow and did not have overt s/s asp however this was limited assessment.

## 2018-06-29 NOTE — SWALLOW BEDSIDE ASSESSMENT ADULT - DIET PRIOR TO ADMI
per granddaughter puree and nectar thick liquid at home since January 2018 admission and reports "she's handling it better".

## 2018-06-29 NOTE — SWALLOW BEDSIDE ASSESSMENT ADULT - SLP GENERAL OBSERVATIONS
Pt was seen at bedside with nasal oxygen in place,  alert and with unintelligible verbal/vocal output. Pt unable to answer questions nor follow directions. Pt did remain alert and aware of interaction.

## 2018-06-29 NOTE — SWALLOW BEDSIDE ASSESSMENT ADULT - SWALLOW EVAL: RECOMMENDED FEEDING/EATING TECHNIQUES
alternate food with liquid/no straws/allow for swallow between intakes/check mouth frequently for oral residue/pocketing/crush medication (when feasible)/position upright (90 degrees)/maintain upright posture during/after eating for 30 mins/oral hygiene/small sips/bites

## 2018-06-29 NOTE — SWALLOW BEDSIDE ASSESSMENT ADULT - COMMENTS
Admitted with fevers, SOB and cough; CXR shows infiltrate right base.  Discussed with granddaughter via phone who reports they use suction machine at home because she holds the food in mouth long periods; home aides use powder to thicken liquids to [place]; reported aspiration pna x 2 in past; Pt completed 4 oz NTL for exam Pt with excessive oral movts as if chewing consistent with dementia behaviors.

## 2018-06-29 NOTE — SWALLOW BEDSIDE ASSESSMENT ADULT - ORAL PHASE
Decreased anterior-posterior movement of the bolus/Delayed oral transit time/Stasis in anterior sulcus/Palatal stasis Delayed oral transit time

## 2018-06-29 NOTE — PROGRESS NOTE ADULT - SUBJECTIVE AND OBJECTIVE BOX
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 As per family, patient also with fevers, lethargy and poor oral intake last few days. No hematemesis, melena or rectal bleeding.    HOSPITAL COURSE 6/28/2018 ADMISSION [facility]   On arrival pt was found to have CO2 retn and was placed on BPAP Her INR was supratherapeutic but repeat one several hr later came down to 1.65   Pt was started empirically on zosyn for poss asp pneum    CXR 6/29 showed rll opac suggesting asp pneum 6/28 uc showed Pseudomonas and this also be be coetiology of sepsis Hypernatremia was Rxed with Hypotonic fluids and improved along with creatinine     IVF  D5 100 (6/29/2018)    VITALS/LABS                           6/29/2018 afeb 97 130/80 15 96%   6/29/2018 W 10 Hb 8.9 Plt 172  Na 156 K 3.6 CO2 32 Cr 1.78   6/29 pct .83     REVIEW OF SYMPTOMS    Able to give ROS  NO     PHYSICAL EXAM    HEENT Unremarkable PERRLA atraumatic   RESP Fair air entry EXP prolonged    Harsh breath sound Resp distres mild   CARDIAC S1 S2 No S3     NO JVD    ABDOMEN SOFT BS PRESENT NOT DISTENDED No hepatosplenomegaly PEDAL EDEMA present No calf tenderness  NO rash     ASSESSMENT RECOMMENDATIONS  6/28/2018 ADMISSION [facility]                         RESP   6/28/2018 2p bpap 12/6/.4 734/57/84   6/29/2018 Pt has alveolar hypoventilation but gas excange is acceptable Cont BPAP periodically and at night if tolerated  and as needed to decrease work of breathing   COPD  Duoneb.4 (6/28)   Solumed 40 (6/29)   6/29/2018 Decreased solumed to 40/d on 6/29  SEPSIS PSUEDOMONAS (6/28 UC) UTI   SEPSIS RLL ASP PNEUMONIA   6/28-6/29  W 16-10  6/27 UA 1020 Mod blood cells R 3-5 W 3-5   6/29 pct .83   6/29/2018 CXR incr r base process   6/28/2018 Pt started on zosyn empirically (6/28/2018)  6/29/2018 check mrsa   6/29/2018 Speech eval ordered    CAD  6/28 - 6/29 Tr 1 .069 (i) - .048 (i) - .015   Enzymes trending down   LACTICEMIA   6/27-6/28 -6/29 LA 2.3-1.7-1.9   Improved  COAGULOPATHY   6/27 10p - 6/28 12p-6/29  INR 14.5 - 1.65 - 1.1  6/28/2018 Improved to find out reason for coumadin  mech valve v af   HYPERNATREMIA   6/28-6/29 Na 157 - 156   6/28/2018 On D5  Cont to monitor serially   SUSAN  6/28-6/29 Cr 3.1 -1.78 Improving  AMS   6/28/2018 CT head n   AGITATION  Quetiapine 25 (6/28)     GLOBAL ISSUE/BEST PRACTICE:      PROBLEM: HOB elevation:   y            PROBLEM: Stress ulcer proph:    protonix 40 (6/28)                       PROBLEM: VTE prophylaxis:      Hep inf (6/29/2018) (HO pe avr)   PROBLEM: Glycemic control:    na  PROBLEM: Nutrition:    npo (6/28)   PROBLEM: Advanced directive: na     PROBLEM: Allergies:  na      TIME SPENT Over 25 minutes aggregate care time spent on encounter; activities included   direct patient care, counseling and/or coordinating care reviewing notes, lab data/ imaging , discussion with multidisciplinary team/ patient  /family

## 2018-06-29 NOTE — SWALLOW BEDSIDE ASSESSMENT ADULT - SWALLOW EVAL: DIAGNOSIS
Pt is a 89 yr old female with RML PNA, UTI and dehydration; multiple admissions and pna episodes; Pt presents with oropharyngeal dysphagia with deteriorated feeding behaviors consistent with dementia dx.

## 2018-06-29 NOTE — SWALLOW BEDSIDE ASSESSMENT ADULT - SLP PERTINENT HISTORY OF CURRENT PROBLEM
Dementia with behavior disturbance; chair bound; hx  PEs; HTN, CHF, s/p aortic valve replacement, COPD and GERD; referred to ED by pmd for supra therapeutic INR.

## 2018-06-29 NOTE — PROGRESS NOTE ADULT - SUBJECTIVE AND OBJECTIVE BOX
Patient is a 89y old  Female who presents with a chief complaint of Sent by MD for elevated INR. (2018 02:29)      INTERVAL HPI/OVERNIGHT EVENTS:    MEDICATIONS  (STANDING):  ALBUTerol/ipratropium for Nebulization 3 milliLiter(s) Nebulizer every 6 hours  dextrose 5%. 1000 milliLiter(s) (100 mL/Hr) IV Continuous <Continuous>  methylPREDNISolone sodium succinate Injectable 40 milliGRAM(s) IV Push every 8 hours  pantoprazole  Injectable 40 milliGRAM(s) IV Push daily  piperacillin/tazobactam IVPB. 3.375 Gram(s) IV Intermittent every 12 hours  QUEtiapine 25 milliGRAM(s) Oral at bedtime    MEDICATIONS  (PRN):      Allergies    Haldol (Unknown)    Intolerances        REVIEW OF SYSTEMS:  CONSTITUTIONAL: No fever, weight loss, or fatigue  EYES: No eye pain, visual disturbances, or discharge  ENMT:  No difficulty hearing, tinnitus, vertigo; No sinus or throat pain  NECK: No pain or stiffness  BREASTS: No pain, masses, or nipple discharge  RESPIRATORY: No cough, wheezing, chills or hemoptysis; No shortness of breath  CARDIOVASCULAR: No chest pain, palpitations, dizziness, or leg swelling  GASTROINTESTINAL: No abdominal or epigastric pain. No nausea, vomiting, or hematemesis; No diarrhea or constipation. No melena or hematochezia.  GENITOURINARY: No dysuria, frequency, hematuria, or incontinence  NEUROLOGICAL: No headaches, memory loss, loss of strength, numbness, or tremors  SKIN: No itching, burning, rashes, or lesions   LYMPH NODES: No enlarged glands  ENDOCRINE: No heat or cold intolerance; No hair loss  MUSCULOSKELETAL: No joint pain or swelling; No muscle, back, or extremity pain  PSYCHIATRIC: No depression, anxiety, mood swings, or difficulty sleeping  HEME/LYMPH: No easy bruising, or bleeding gums  ALLERGY AND IMMUNOLOGIC: No hives or eczema    Vital Signs Last 24 Hrs  T(C): 37 (2018 05:13), Max: 37 (2018 05:13)  T(F): 98.6 (2018 05:13), Max: 98.6 (2018 05:13)  HR: 65 (2018 09:33) (49 - 65)  BP: 104/61 (2018 05:13) (101/76 - 128/88)  BP(mean): --  RR: 14 (2018 05:13) (14 - 18)  SpO2: 95% (2018 09:33) (92% - 97%)    PHYSICAL EXAM:  GENERAL: NAD, well-groomed, well-developed  HEAD:  Atraumatic, Normocephalic  EYES: EOMI, PERRLA, conjunctiva and sclera clear  ENMT: No tonsillar erythema, exudates, or enlargement; Moist mucous membranes, Good dentition, No lesions  NECK: Supple, No JVD, Normal thyroid  NERVOUS SYSTEM:  Alert & Oriented X3, Good concentration; Motor Strength 5/5 B/L upper and lower extremities; DTRs 2+ intact and symmetric  CHEST/LUNG: Clear to percussion bilaterally; No rales, rhonchi, wheezing, or rubs  HEART: Regular rate and rhythm; No murmurs, rubs, or gallops  ABDOMEN: Soft, Nontender, Nondistended; Bowel sounds present  EXTREMITIES:  2+ Peripheral Pulses, No clubbing, cyanosis, or edema  LYMPH: No lymphadenopathy noted  SKIN: No rashes or lesions    LABS:                        8.9    10.01 )-----------( 172      ( 2018 07:01 )             31.6     06-    156<H>  |  118<H>  |  66<H>  ----------------------------<  108<H>  3.6   |  32<H>  |  1.78<H>    Ca    8.7      2018 07:01  Phos  2.7       Mg     2.8         TPro  7.1  /  Alb  2.7<L>  /  TBili  0.6  /  DBili  .18  /  AST  15  /  ALT  6<L>  /  AlkPhos  57  -29    PT/INR - ( 2018 07:01 )   PT: 12.1 sec;   INR: 1.11 ratio         PTT - ( 2018 12:12 )  PTT:33.6 sec  Urinalysis Basic - ( 2018 23:58 )    Color: Yellow / Appearance: Clear / S.020 / pH: x  Gluc: x / Ketone: Trace  / Bili: Moderate / Urobili: 4 mg/dL   Blood: x / Protein: 30 mg/dL / Nitrite: Negative   Leuk Esterase: Small / RBC: 3-5 /HPF / WBC 3-5   Sq Epi: x / Non Sq Epi: Moderate / Bacteria: x      CAPILLARY BLOOD GLUCOSE      POCT Blood Glucose.: 123 mg/dL (2018 21:41)      Culture - Blood (collected 2018 09:28)  Source: .Blood Blood-Peripheral  Preliminary Report (2018 10:01):    No growth to date.    Culture - Blood (collected 2018 09:28)  Source: .Blood Blood-Peripheral  Preliminary Report (2018 10:01):    No growth to date.      RADIOLOGY & ADDITIONAL TESTS:    Imaging Personally Reviewed:  [ ] YES  [ ] NO    Consultant(s) Notes Reviewed:  [ ] YES  [ ] NO    Care Discussed with Consultants/Other Providers [ ] YES  [ ] NO Patient is a 89y old  Female who presents with a chief complaint of Sent by MD for elevated INR. (2018 02:29)      INTERVAL HPI/OVERNIGHT EVENTS:  Hospitalist follow-up for multiple medical problems, including:    Acute encephalopathy - much more awake, doesn't follow commands or have meaningful conversation.  Has baseline dementia/confusion.    Acute respiratory failure - remains on BIPAP 12/6 with FiO2 40%, oxygenation at 92-96%.    Hypotension - SBPs improved, consistently above 100s.    ARF - Shepherd with clear urine.    MEDICATIONS  (STANDING):  ALBUTerol/ipratropium for Nebulization 3 milliLiter(s) Nebulizer every 6 hours  dextrose 5%. 1000 milliLiter(s) (100 mL/Hr) IV Continuous <Continuous>  methylPREDNISolone sodium succinate Injectable 40 milliGRAM(s) IV Push every 8 hours  pantoprazole  Injectable 40 milliGRAM(s) IV Push daily  piperacillin/tazobactam IVPB. 3.375 Gram(s) IV Intermittent every 12 hours  QUEtiapine 25 milliGRAM(s) Oral at bedtime    MEDICATIONS  (PRN):      Allergies    Haldol (Unknown)    Intolerances        REVIEW OF SYSTEMS:  Unable to obtain due to mental status.    Vital Signs Last 24 Hrs  T(C): 37 (2018 05:13), Max: 37 (2018 05:13)  T(F): 98.6 (2018 05:13), Max: 98.6 (2018 05:13)  HR: 65 (2018 09:33) (49 - 65)  BP: 104/61 (2018 05:13) (101/76 - 128/88)  BP(mean): --  RR: 14 (2018 05:13) (14 - 18)  SpO2: 95% (2018 09:33) (92% - 97%)    PHYSICAL EXAM:  GENERAL:  Awake, on BIPAP.  Doesn't interact, makes eye contact.  HEAD:  Atraumatic, Normocephalic  EYES: EOMI, PERRLA, conjunctiva and sclera clear  NERVOUS SYSTEM:  Unable to assess due to mental status and not cooperative.  CHEST/LUNG: Clear anteriorly; No rales, rhonchi, wheezing, or rubs, respiratory effort does not appear labored.  HEART: Regular rate and rhythm; No murmurs, rubs, or gallops  ABDOMEN: Soft, Nontender, Nondistended; Bowel sounds present  EXTREMITIES:  No clubbing, cyanosis, or edema  SKIN: No rashes or lesions, poor turgor.    LABS:                        8.9    10.01 )-----------( 172      ( 2018 07:01 )             31.6     06-29    156<H>  |  118<H>  |  66<H>  ----------------------------<  108<H>  3.6   |  32<H>  |  1.78<H>    Ca    8.7      2018 07:01  Phos  2.7     -  Mg     2.8         TPro  7.1  /  Alb  2.7<L>  /  TBili  0.6  /  DBili  .18  /  AST  15  /  ALT  6<L>  /  AlkPhos  57  29    PT/INR - ( 2018 07:01 )   PT: 12.1 sec;   INR: 1.11 ratio         PTT - ( 2018 12:12 )  PTT:33.6 sec  Urinalysis Basic - ( 2018 23:58 )    Color: Yellow / Appearance: Clear / S.020 / pH: x  Gluc: x / Ketone: Trace  / Bili: Moderate / Urobili: 4 mg/dL   Blood: x / Protein: 30 mg/dL / Nitrite: Negative   Leuk Esterase: Small / RBC: 3-5 /HPF / WBC 3-5   Sq Epi: x / Non Sq Epi: Moderate / Bacteria: x      CAPILLARY BLOOD GLUCOSE      POCT Blood Glucose.: 123 mg/dL (2018 21:41)      Culture - Blood (collected 2018 09:28)  Source: .Blood Blood-Peripheral  Preliminary Report (2018 10:01):    No growth to date.    Culture - Blood (collected 2018 09:28)  Source: .Blood Blood-Peripheral  Preliminary Report (2018 10:01):    No growth to date.      RADIOLOGY & ADDITIONAL TESTS:    Imaging Personally Reviewed:  [X] YES  [ ] NO    Consultant(s) Notes Reviewed:  [X] YES  [ ] NO    Care Discussed with Consultants/Other Providers [ ] YES  [ ] NO

## 2018-06-29 NOTE — SWALLOW BEDSIDE ASSESSMENT ADULT - ORAL PREPARATORY PHASE
Reduced oral grading Reduced oral grading/Anterior loss of bolus Decreased mastication ability/Reduced oral grading/extraneous oral movements

## 2018-06-30 LAB
-  AMIKACIN: SIGNIFICANT CHANGE UP
-  AZTREONAM: SIGNIFICANT CHANGE UP
-  CEFEPIME: SIGNIFICANT CHANGE UP
-  CEFTAZIDIME: SIGNIFICANT CHANGE UP
-  CIPROFLOXACIN: SIGNIFICANT CHANGE UP
-  GENTAMICIN: SIGNIFICANT CHANGE UP
-  IMIPENEM: SIGNIFICANT CHANGE UP
-  LEVOFLOXACIN: SIGNIFICANT CHANGE UP
-  MEROPENEM: SIGNIFICANT CHANGE UP
-  PIPERACILLIN/TAZOBACTAM: SIGNIFICANT CHANGE UP
-  TOBRAMYCIN: SIGNIFICANT CHANGE UP
ANION GAP SERPL CALC-SCNC: 8 MMOL/L — SIGNIFICANT CHANGE UP (ref 5–17)
APTT BLD: 92.6 SEC — HIGH (ref 27.5–37.4)
APTT BLD: 98.2 SEC — HIGH (ref 27.5–37.4)
BASE EXCESS BLDA CALC-SCNC: 0.4 MMOL/L — SIGNIFICANT CHANGE UP (ref -2–2)
BLOOD GAS COMMENTS: SIGNIFICANT CHANGE UP
BLOOD GAS SOURCE: SIGNIFICANT CHANGE UP
BUN SERPL-MCNC: 44 MG/DL — HIGH (ref 7–23)
CALCIUM SERPL-MCNC: 8.8 MG/DL — SIGNIFICANT CHANGE UP (ref 8.5–10.1)
CHLORIDE SERPL-SCNC: 116 MMOL/L — HIGH (ref 96–108)
CO2 SERPL-SCNC: 28 MMOL/L — SIGNIFICANT CHANGE UP (ref 22–31)
CREAT SERPL-MCNC: 1.41 MG/DL — HIGH (ref 0.5–1.3)
CULTURE RESULTS: SIGNIFICANT CHANGE UP
GLUCOSE SERPL-MCNC: 113 MG/DL — HIGH (ref 70–99)
HCO3 BLDA-SCNC: 25 MMOL/L — SIGNIFICANT CHANGE UP (ref 21–29)
HCT VFR BLD CALC: 31.9 % — LOW (ref 34.5–45)
HCT VFR BLD CALC: 34.2 % — LOW (ref 34.5–45)
HGB BLD-MCNC: 8.9 G/DL — LOW (ref 11.5–15.5)
HGB BLD-MCNC: 9.6 G/DL — LOW (ref 11.5–15.5)
HOROWITZ INDEX BLDA+IHG-RTO: 32 — SIGNIFICANT CHANGE UP
INR BLD: 1.37 RATIO — HIGH (ref 0.88–1.16)
LEGIONELLA AG UR QL: NEGATIVE — SIGNIFICANT CHANGE UP
MCHC RBC-ENTMCNC: 26.4 PG — LOW (ref 27–34)
MCHC RBC-ENTMCNC: 26.9 PG — LOW (ref 27–34)
MCHC RBC-ENTMCNC: 27.9 GM/DL — LOW (ref 32–36)
MCHC RBC-ENTMCNC: 28.1 GM/DL — LOW (ref 32–36)
MCV RBC AUTO: 94.7 FL — SIGNIFICANT CHANGE UP (ref 80–100)
MCV RBC AUTO: 95.8 FL — SIGNIFICANT CHANGE UP (ref 80–100)
METHOD TYPE: SIGNIFICANT CHANGE UP
NRBC # BLD: 0 /100 WBCS — SIGNIFICANT CHANGE UP (ref 0–0)
NRBC # BLD: 0 /100 WBCS — SIGNIFICANT CHANGE UP (ref 0–0)
ORGANISM # SPEC MICROSCOPIC CNT: SIGNIFICANT CHANGE UP
ORGANISM # SPEC MICROSCOPIC CNT: SIGNIFICANT CHANGE UP
PCO2 BLDA: 41 MMHG — SIGNIFICANT CHANGE UP (ref 32–46)
PH BLD: 7.4 — SIGNIFICANT CHANGE UP (ref 7.35–7.45)
PLATELET # BLD AUTO: 164 K/UL — SIGNIFICANT CHANGE UP (ref 150–400)
PLATELET # BLD AUTO: 175 K/UL — SIGNIFICANT CHANGE UP (ref 150–400)
PO2 BLDA: 92 MMHG — SIGNIFICANT CHANGE UP (ref 74–108)
POTASSIUM SERPL-MCNC: 3.8 MMOL/L — SIGNIFICANT CHANGE UP (ref 3.5–5.3)
POTASSIUM SERPL-SCNC: 3.8 MMOL/L — SIGNIFICANT CHANGE UP (ref 3.5–5.3)
PROTHROM AB SERPL-ACNC: 15 SEC — HIGH (ref 9.8–12.7)
RBC # BLD: 3.37 M/UL — LOW (ref 3.8–5.2)
RBC # BLD: 3.57 M/UL — LOW (ref 3.8–5.2)
RBC # FLD: 19.7 % — HIGH (ref 10.3–14.5)
RBC # FLD: 19.8 % — HIGH (ref 10.3–14.5)
SAO2 % BLDA: 97 % — HIGH (ref 92–96)
SODIUM SERPL-SCNC: 152 MMOL/L — HIGH (ref 135–145)
SPECIMEN SOURCE: SIGNIFICANT CHANGE UP
WBC # BLD: 9.49 K/UL — SIGNIFICANT CHANGE UP (ref 3.8–10.5)
WBC # BLD: 9.86 K/UL — SIGNIFICANT CHANGE UP (ref 3.8–10.5)
WBC # FLD AUTO: 9.49 K/UL — SIGNIFICANT CHANGE UP (ref 3.8–10.5)
WBC # FLD AUTO: 9.86 K/UL — SIGNIFICANT CHANGE UP (ref 3.8–10.5)

## 2018-06-30 PROCEDURE — 99233 SBSQ HOSP IP/OBS HIGH 50: CPT

## 2018-06-30 RX ORDER — WARFARIN SODIUM 2.5 MG/1
2 TABLET ORAL ONCE
Qty: 0 | Refills: 0 | Status: COMPLETED | OUTPATIENT
Start: 2018-06-30 | End: 2018-06-30

## 2018-06-30 RX ADMIN — Medication 3 MILLILITER(S): at 23:24

## 2018-06-30 RX ADMIN — Medication 3 MILLILITER(S): at 05:32

## 2018-06-30 RX ADMIN — PIPERACILLIN AND TAZOBACTAM 25 GRAM(S): 4; .5 INJECTION, POWDER, LYOPHILIZED, FOR SOLUTION INTRAVENOUS at 00:36

## 2018-06-30 RX ADMIN — SODIUM CHLORIDE 85 MILLILITER(S): 9 INJECTION, SOLUTION INTRAVENOUS at 17:41

## 2018-06-30 RX ADMIN — QUETIAPINE FUMARATE 25 MILLIGRAM(S): 200 TABLET, FILM COATED ORAL at 21:36

## 2018-06-30 RX ADMIN — PANTOPRAZOLE SODIUM 40 MILLIGRAM(S): 20 TABLET, DELAYED RELEASE ORAL at 11:50

## 2018-06-30 RX ADMIN — HEPARIN SODIUM 900 UNIT(S)/HR: 5000 INJECTION INTRAVENOUS; SUBCUTANEOUS at 00:53

## 2018-06-30 RX ADMIN — HEPARIN SODIUM 800 UNIT(S)/HR: 5000 INJECTION INTRAVENOUS; SUBCUTANEOUS at 10:23

## 2018-06-30 RX ADMIN — HEPARIN SODIUM 0 UNIT(S)/HR: 5000 INJECTION INTRAVENOUS; SUBCUTANEOUS at 08:52

## 2018-06-30 RX ADMIN — Medication 3 MILLILITER(S): at 17:27

## 2018-06-30 RX ADMIN — Medication 40 MILLIGRAM(S): at 05:34

## 2018-06-30 RX ADMIN — HEPARIN SODIUM 800 UNIT(S)/HR: 5000 INJECTION INTRAVENOUS; SUBCUTANEOUS at 17:56

## 2018-06-30 RX ADMIN — WARFARIN SODIUM 2 MILLIGRAM(S): 2.5 TABLET ORAL at 21:36

## 2018-06-30 RX ADMIN — Medication 3 MILLILITER(S): at 11:27

## 2018-06-30 RX ADMIN — PIPERACILLIN AND TAZOBACTAM 25 GRAM(S): 4; .5 INJECTION, POWDER, LYOPHILIZED, FOR SOLUTION INTRAVENOUS at 11:49

## 2018-06-30 NOTE — PROGRESS NOTE ADULT - SUBJECTIVE AND OBJECTIVE BOX
GLOBAL ISSUE/BEST PRACTICE:      PROBLEM: HOB elevation:   y            PROBLEM: Stress ulcer proph:    protonix 40 (6/28)                       PROBLEM: VTE prophylaxis:      Hep inf (6/29/2018) (HO pe avr) warfarin (6/30/2018)   PROBLEM: Glycemic control:    na  PROBLEM: Nutrition:    npo (6/28) --> Dys 1 puree Nectar (6/29)   PROBLEM: Advanced directive: na     PROBLEM: Allergies:  na      TIME SPENT Over 25 minutes aggregate care time spent on encounter; activities included   direct patient care, counseling and/or coordinating care reviewing notes, lab data/ imaging , discussion with multidisciplinary team/ patient  /family

## 2018-06-30 NOTE — PROGRESS NOTE ADULT - SUBJECTIVE AND OBJECTIVE BOX
Patient is a 89y old  Female who presents with a chief complaint of Sent by MD for elevated INR. (28 Jun 2018 02:29)      INTERVAL HPI/OVERNIGHT EVENTS: non verbal  concern with difficuly getting pulse ox     MEDICATIONS  (STANDING):  ALBUTerol/ipratropium for Nebulization 3 milliLiter(s) Nebulizer every 6 hours  dextrose 5%. 1000 milliLiter(s) (85 mL/Hr) IV Continuous <Continuous>  heparin  Infusion.  Unit(s)/Hr (9 mL/Hr) IV Continuous <Continuous>  methylPREDNISolone sodium succinate Injectable 40 milliGRAM(s) IV Push daily  pantoprazole  Injectable 40 milliGRAM(s) IV Push daily  piperacillin/tazobactam IVPB. 3.375 Gram(s) IV Intermittent every 12 hours  QUEtiapine 25 milliGRAM(s) Oral at bedtime  warfarin 2 milliGRAM(s) Oral once    MEDICATIONS  (PRN):  heparin  Injectable 4000 Unit(s) IV Push every 6 hours PRN For aPTT less than 40  heparin  Injectable 2000 Unit(s) IV Push every 6 hours PRN For aPTT between 40 - 57      Allergies    Haldol (Unknown)    Intolerances        REVIEW OF SYSTEMS:  on able to obtain secondary to patient condition   Vital Signs Last 24 Hrs  T(C): 36.6 (30 Jun 2018 12:43), Max: 37.3 (30 Jun 2018 00:43)  T(F): 97.9 (30 Jun 2018 12:43), Max: 99.2 (30 Jun 2018 00:43)  HR: 80 (30 Jun 2018 12:43) (50 - 82)  BP: 143/53 (30 Jun 2018 12:43) (110/49 - 145/74)  BP(mean): --  RR: 17 (30 Jun 2018 12:43) (14 - 17)  SpO2: 93% (30 Jun 2018 08:52) (91% - 96%)    PHYSICAL EXAM:  GENERAL: NAD, w  HEAD:  Atraumatic, Normocephalic  EYES: EOMI, PERRLA, conjunctiva and sclera clear  ENMT: No tonsillar erythema, exudates, or enlargement; Moist mucous membranes, poor  dentition, No lesions  NECK: Supple, No JVD, Normal thyroid  NERVOUS SYSTEM:  awake not oriented   CHEST/LUNG: fine crackles   HEART: Regular rate and rhythm; No murmurs, rubs, or gallops  ABDOMEN: Soft, Nontender, Nondistended; Bowel sounds present  EXTREMITIES:  2+ Peripheral Pulses, No clubbing, cyanosis, or edema  LYMPH: No lymphadenopathy noted  SKIN: No rashes or lesions    LABS:                        9.6    9.49  )-----------( 164      ( 30 Jun 2018 08:15 )             34.2     06-30    152<H>  |  116<H>  |  44<H>  ----------------------------<  113<H>  3.8   |  28  |  1.41<H>    Ca    8.8      30 Jun 2018 08:15  Phos  2.7     06-29  Mg     2.8     06-29    TPro  7.1  /  Alb  2.7<L>  /  TBili  0.6  /  DBili  .18  /  AST  15  /  ALT  6<L>  /  AlkPhos  57  06-29    PT/INR - ( 30 Jun 2018 08:15 )   PT: 15.0 sec;   INR: 1.37 ratio         PTT - ( 30 Jun 2018 08:15 )  PTT:142.8 sec    CAPILLARY BLOOD GLUCOSE          RADIOLOGY & ADDITIONAL TESTS:    Imaging Personally Reviewed:  [X ] YES  [ ] NO    Consultant(s) Notes Reviewed:  [X ] YES  [ ] NO    Care Discussed with Consultants/Other Providers [ X] YES  [ ] NO

## 2018-07-01 LAB
ANION GAP SERPL CALC-SCNC: 9 MMOL/L — SIGNIFICANT CHANGE UP (ref 5–17)
APTT BLD: 125.2 SEC — CRITICAL HIGH (ref 27.5–37.4)
APTT BLD: 65.7 SEC — HIGH (ref 27.5–37.4)
APTT BLD: 99.4 SEC — HIGH (ref 27.5–37.4)
BUN SERPL-MCNC: 27 MG/DL — HIGH (ref 7–23)
CALCIUM SERPL-MCNC: 8.1 MG/DL — LOW (ref 8.5–10.1)
CHLORIDE SERPL-SCNC: 112 MMOL/L — HIGH (ref 96–108)
CO2 SERPL-SCNC: 28 MMOL/L — SIGNIFICANT CHANGE UP (ref 22–31)
CREAT SERPL-MCNC: 0.97 MG/DL — SIGNIFICANT CHANGE UP (ref 0.5–1.3)
GLUCOSE SERPL-MCNC: 96 MG/DL — SIGNIFICANT CHANGE UP (ref 70–99)
HCT VFR BLD CALC: 28.9 % — LOW (ref 34.5–45)
HGB BLD-MCNC: 8.6 G/DL — LOW (ref 11.5–15.5)
INR BLD: 1.79 RATIO — HIGH (ref 0.88–1.16)
MAGNESIUM SERPL-MCNC: 2.1 MG/DL — SIGNIFICANT CHANGE UP (ref 1.6–2.6)
MCHC RBC-ENTMCNC: 26.9 PG — LOW (ref 27–34)
MCHC RBC-ENTMCNC: 29.8 GM/DL — LOW (ref 32–36)
MCV RBC AUTO: 90.3 FL — SIGNIFICANT CHANGE UP (ref 80–100)
NRBC # BLD: 0 /100 WBCS — SIGNIFICANT CHANGE UP (ref 0–0)
PHOSPHATE SERPL-MCNC: 1.7 MG/DL — LOW (ref 2.5–4.5)
PLATELET # BLD AUTO: 183 K/UL — SIGNIFICANT CHANGE UP (ref 150–400)
POTASSIUM SERPL-MCNC: 2.9 MMOL/L — CRITICAL LOW (ref 3.5–5.3)
POTASSIUM SERPL-SCNC: 2.9 MMOL/L — CRITICAL LOW (ref 3.5–5.3)
PROTHROM AB SERPL-ACNC: 19.7 SEC — HIGH (ref 9.8–12.7)
RBC # BLD: 3.2 M/UL — LOW (ref 3.8–5.2)
RBC # FLD: 19.3 % — HIGH (ref 10.3–14.5)
S PNEUM AG SER QL: SIGNIFICANT CHANGE UP
SODIUM SERPL-SCNC: 149 MMOL/L — HIGH (ref 135–145)
WBC # BLD: 8.32 K/UL — SIGNIFICANT CHANGE UP (ref 3.8–10.5)
WBC # FLD AUTO: 8.32 K/UL — SIGNIFICANT CHANGE UP (ref 3.8–10.5)

## 2018-07-01 PROCEDURE — 99233 SBSQ HOSP IP/OBS HIGH 50: CPT

## 2018-07-01 RX ORDER — POTASSIUM PHOSPHATE, MONOBASIC POTASSIUM PHOSPHATE, DIBASIC 236; 224 MG/ML; MG/ML
15 INJECTION, SOLUTION INTRAVENOUS ONCE
Qty: 0 | Refills: 0 | Status: COMPLETED | OUTPATIENT
Start: 2018-07-01 | End: 2018-07-01

## 2018-07-01 RX ORDER — ACETAMINOPHEN 500 MG
650 TABLET ORAL ONCE
Qty: 0 | Refills: 0 | Status: COMPLETED | OUTPATIENT
Start: 2018-07-01 | End: 2018-07-01

## 2018-07-01 RX ORDER — POTASSIUM CHLORIDE 20 MEQ
10 PACKET (EA) ORAL
Qty: 0 | Refills: 0 | Status: COMPLETED | OUTPATIENT
Start: 2018-07-01 | End: 2018-07-01

## 2018-07-01 RX ORDER — WARFARIN SODIUM 2.5 MG/1
5 TABLET ORAL ONCE
Qty: 0 | Refills: 0 | Status: COMPLETED | OUTPATIENT
Start: 2018-07-01 | End: 2018-07-01

## 2018-07-01 RX ADMIN — HEPARIN SODIUM 700 UNIT(S)/HR: 5000 INJECTION INTRAVENOUS; SUBCUTANEOUS at 09:15

## 2018-07-01 RX ADMIN — Medication 650 MILLIGRAM(S): at 14:24

## 2018-07-01 RX ADMIN — Medication 3 MILLILITER(S): at 11:20

## 2018-07-01 RX ADMIN — QUETIAPINE FUMARATE 25 MILLIGRAM(S): 200 TABLET, FILM COATED ORAL at 22:35

## 2018-07-01 RX ADMIN — Medication 100 MILLIEQUIVALENT(S): at 13:36

## 2018-07-01 RX ADMIN — Medication 3 MILLILITER(S): at 23:11

## 2018-07-01 RX ADMIN — PIPERACILLIN AND TAZOBACTAM 25 GRAM(S): 4; .5 INJECTION, POWDER, LYOPHILIZED, FOR SOLUTION INTRAVENOUS at 00:49

## 2018-07-01 RX ADMIN — SODIUM CHLORIDE 85 MILLILITER(S): 9 INJECTION, SOLUTION INTRAVENOUS at 11:17

## 2018-07-01 RX ADMIN — Medication 3 MILLILITER(S): at 05:31

## 2018-07-01 RX ADMIN — PANTOPRAZOLE SODIUM 40 MILLIGRAM(S): 20 TABLET, DELAYED RELEASE ORAL at 11:26

## 2018-07-01 RX ADMIN — Medication 100 MILLIEQUIVALENT(S): at 11:25

## 2018-07-01 RX ADMIN — Medication 40 MILLIGRAM(S): at 06:18

## 2018-07-01 RX ADMIN — Medication 3 MILLILITER(S): at 17:14

## 2018-07-01 RX ADMIN — Medication 650 MILLIGRAM(S): at 13:42

## 2018-07-01 RX ADMIN — WARFARIN SODIUM 5 MILLIGRAM(S): 2.5 TABLET ORAL at 22:35

## 2018-07-01 RX ADMIN — HEPARIN SODIUM 700 UNIT(S)/HR: 5000 INJECTION INTRAVENOUS; SUBCUTANEOUS at 19:32

## 2018-07-01 RX ADMIN — Medication 100 MILLIEQUIVALENT(S): at 16:24

## 2018-07-01 RX ADMIN — HEPARIN SODIUM 700 UNIT(S)/HR: 5000 INJECTION INTRAVENOUS; SUBCUTANEOUS at 00:49

## 2018-07-01 RX ADMIN — PIPERACILLIN AND TAZOBACTAM 25 GRAM(S): 4; .5 INJECTION, POWDER, LYOPHILIZED, FOR SOLUTION INTRAVENOUS at 13:37

## 2018-07-01 RX ADMIN — POTASSIUM PHOSPHATE, MONOBASIC POTASSIUM PHOSPHATE, DIBASIC 63.75 MILLIMOLE(S): 236; 224 INJECTION, SOLUTION INTRAVENOUS at 17:44

## 2018-07-01 NOTE — PROGRESS NOTE ADULT - ASSESSMENT
1.  Acute encephalopathy superimposed on dementia - improved, most likely metabolic.  Swallow evaluation if can tolerate off BIPAP mask.  Avoid medications with CNS side effects.  Resume outpatient dementia medications when able.    2.  Acute respiratory failure - in setting of pneumonia and sepsis.  Pulmonology consult appreciated.  Taper IV steroids as tolerated.  Continue Zosyn.  Taper BIPAP as tolerated.    3.  Acute renal failure - improving ATN from sepsis dehydration.  Continue IVF.  Remove Shepherd for voiding trial.  Avoid nephrotoxins.  Dose all medications for reduced eGFR.  4Hypokalemia and hypophosph replated     4.  Coagulopathy - INR normal now.  On warfarin for history of PE.  Granddaughter (RN) states AVR is not mechanical but will check with other family members (unable to contact daughter).  Start IV heparin until tolerates oral.  Resume warfarin if tolerates oral.    5.  Hypernatremia.  D5W started overnight.  Will repeat serum sodium today.    I called and updated patient's emergency contact/grand-daughter on patient's condition and plan of care.

## 2018-07-01 NOTE — PROGRESS NOTE ADULT - SUBJECTIVE AND OBJECTIVE BOX
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 As per family, patient also with fevers, lethargy and poor oral intake last few days. No hematemesis, melena or rectal bleeding.    HOSPITAL COURSE 6/28/2018 [facility]   On arrival pt was found to have CO2 retn and was placed on BPAP Her INR was supratherapeutic but repeat one several hr later came down to 1.65   Pt was started empirically on zosyn for poss asp pneum    CXR 6/29 showed rll opac suggesting asp pneum 6/28 uc showed Pseudomonas and this is possible  coetiology of sepsis Hypernatremia was Rxed with Hypotonic fluids and improved along with creatinine improvement Pt was place don iv ufh for ho pe on 6/29 BPAP was dced 6/29 Dys 1 puree nectar diet started 6/30/2018     IVF  D5 85 (6/29/2018)    VITALS/LABS                           7/1/2018 99f 86 115/78 17 95%   7/1/2018 W 8.3 Hb 8.6 Plt 183 Na 149 K 2.9 CO2 28 Cr .9 PO4 1.7     REVIEW OF SYMPTOMS    Able to give ROS  NO     PHYSICAL EXAM    HEENT Unremarkable PERRLA atraumatic   RESP Fair air entry EXP prolonged    Harsh breath sound Resp distres mild   CARDIAC S1 S2 No S3     NO JVD    ABDOMEN SOFT BS PRESENT NOT DISTENDED No hepatosplenomegaly PEDAL EDEMA present No calf tenderness  NO rash     ASSESSMENT RECOMMENDATIONS  6/28/2018 ADMISSION [facility]                         RESP   6/30/2018 3l 740/41/92   6/30/2018 Pt had alveolar hypoventilation poa but gas excange is acceptable Cont BPAP periodically and at night if tolerated  and as needed to decrease work of breathing   COPD  Duoneb.4 (6/28)   Solumed 40 (6/29)   6/29/2018 Decreased solumed to 40/d on 6/29  SEPSIS PSUEDOMONAS (6/28 UC) UTI   SEPSIS RLL ASP PNEUMONIA   6/28-6/29-6/30-7/1 W 16-10-9.4-8.3  Imroving   On  Zosyn (6/28)     DYSPHAGIA   6/29 Speech eval Dys 1 Puree [place] (6/29)   HO PE   On IV UFH   7/1/2018 Suggest resume coumadin    CAD  6/28 - 6/29 Tr 1 .069 (i) - .048 (i) - .015   Enzymes trending down   LACTICEMIA   6/27-6/28 -6/29 LA 2.3-1.7-1.9   Improved  COAGULOPATHY   6/27 10p - 6/28 12p-6/29  INR 14.5 - 1.65 - 1.1  6/28/2018 Improved to find out reason for coumadin  mech valve v af   HYPERNATREMIA   6/28-6/29-6/30-7/1 Na 157 - 156 - 152-149 Improving  6/28/2018 On D5  Cont to monitor serially   SUSAN  6/28-6/29-6/30-7/1 Cr 3.1 -1.78-1.4 - .9  Improving  AMS   6/28/2018 CT head n   AGITATION  Quetiapine 25 (6/28)     GLOBAL ISSUE/BEST PRACTICE:      PROBLEM: HOB elevation:   y            PROBLEM: Stress ulcer proph:    protonix 40 (6/28)                       PROBLEM: VTE prophylaxis:      Hep inf (6/29/2018) (HO pe avr) warfarin (6/30/2018)   PROBLEM: Glycemic control:    na  PROBLEM: Nutrition:    npo (6/28) --> Dys 1 puree Nectar (6/29)   PROBLEM: Advanced directive: na     PROBLEM: Allergies:  na      TIME SPENT Over 25 minutes aggregate care time spent on encounter; activities included   direct patient care, counseling and/or coordinating care reviewing notes, lab data/ imaging , discussion with multidisciplinary team/ patient  /family

## 2018-07-01 NOTE — PROGRESS NOTE ADULT - SUBJECTIVE AND OBJECTIVE BOX
Patient is a 89y old  Female who presents with a chief complaint of Sent by MD for elevated INR. (28 Jun 2018 02:29)      INTERVAL HPI/OVERNIGHT EVENTS: no acute evnts overnight patient is non verbal     MEDICATIONS  (STANDING):  ALBUTerol/ipratropium for Nebulization 3 milliLiter(s) Nebulizer every 6 hours  dextrose 5%. 1000 milliLiter(s) (85 mL/Hr) IV Continuous <Continuous>  heparin  Infusion.  Unit(s)/Hr (9 mL/Hr) IV Continuous <Continuous>  methylPREDNISolone sodium succinate Injectable 40 milliGRAM(s) IV Push daily  pantoprazole  Injectable 40 milliGRAM(s) IV Push daily  piperacillin/tazobactam IVPB. 3.375 Gram(s) IV Intermittent every 12 hours  QUEtiapine 25 milliGRAM(s) Oral at bedtime  warfarin 5 milliGRAM(s) Oral once    MEDICATIONS  (PRN):  heparin  Injectable 4000 Unit(s) IV Push every 6 hours PRN For aPTT less than 40  heparin  Injectable 2000 Unit(s) IV Push every 6 hours PRN For aPTT between 40 - 57      Allergies    Haldol (Unknown)    Intolerances        REVIEW OF SYSTEMS:  unable to obtain     Vital Signs Last 24 Hrs  T(C): 36.6 (01 Jul 2018 16:34), Max: 37.2 (01 Jul 2018 10:35)  T(F): 97.9 (01 Jul 2018 16:34), Max: 99 (01 Jul 2018 10:35)  HR: 82 (01 Jul 2018 18:10) (66 - 98)  BP: 142/67 (01 Jul 2018 16:34) (115/78 - 164/56)  BP(mean): --  RR: 18 (01 Jul 2018 16:34) (17 - 18)  SpO2: 96% (01 Jul 2018 18:10) (80% - 96%)    PHYSICAL EXAM:  GENERAL: NAD, well-groomed, well-developed  HEAD:  Atraumatic, Normocephalic  EYES: EOMI, PERRLA, conjunctiva and sclera clear  ENMT: No tonsillar erythema, exudates, or enlargement; Moist mucous membranes, Good dentition, No lesions  NECK: Supple, No JVD, Normal thyroid  NERVOUS SYSTEM:  Alert & Oriented X3, Good concentration; Motor Strength 5/5 B/L upper and lower extremities; DTRs 2+ intact and symmetric  CHEST/LUNG: Clear to percussion bilaterally; No rales, rhonchi, wheezing, or rubs  HEART: Regular rate and rhythm; No murmurs, rubs, or gallops  ABDOMEN: Soft, Nontender, Nondistended; Bowel sounds present  EXTREMITIES:  2+ Peripheral Pulses, No clubbing, cyanosis, or edema  LYMPH: No lymphadenopathy noted  SKIN: No rashes or lesions    LABS:                        8.6    8.32  )-----------( 183      ( 01 Jul 2018 07:32 )             28.9     07-01    149<H>  |  112<H>  |  27<H>  ----------------------------<  96  2.9<LL>   |  28  |  0.97    Ca    8.1<L>      01 Jul 2018 07:32  Phos  1.7     07-01  Mg     2.1     07-01      PT/INR - ( 01 Jul 2018 18:54 )   PT: 19.7 sec;   INR: 1.79 ratio         PTT - ( 01 Jul 2018 18:25 )  PTT:65.7 sec    CAPILLARY BLOOD GLUCOSE          RADIOLOGY & ADDITIONAL TESTS:    Imaging Personally Reviewed:  [X ] YES  [ ] NO    Consultant(s) Notes Reviewed:  [X ] YES  [ ] NO    Care Discussed with Consultants/Other Providers [X ] YES  [ ] NO

## 2018-07-02 LAB
ANION GAP SERPL CALC-SCNC: 10 MMOL/L — SIGNIFICANT CHANGE UP (ref 5–17)
APTT BLD: 83.5 SEC — HIGH (ref 27.5–37.4)
BUN SERPL-MCNC: 17 MG/DL — SIGNIFICANT CHANGE UP (ref 7–23)
CALCIUM SERPL-MCNC: 8.3 MG/DL — LOW (ref 8.5–10.1)
CHLORIDE SERPL-SCNC: 113 MMOL/L — HIGH (ref 96–108)
CO2 SERPL-SCNC: 24 MMOL/L — SIGNIFICANT CHANGE UP (ref 22–31)
CREAT SERPL-MCNC: 0.94 MG/DL — SIGNIFICANT CHANGE UP (ref 0.5–1.3)
GLUCOSE SERPL-MCNC: 80 MG/DL — SIGNIFICANT CHANGE UP (ref 70–99)
HCT VFR BLD CALC: 29.3 % — LOW (ref 34.5–45)
HGB BLD-MCNC: 8.5 G/DL — LOW (ref 11.5–15.5)
INR BLD: 1.95 RATIO — HIGH (ref 0.88–1.16)
MAGNESIUM SERPL-MCNC: 2 MG/DL — SIGNIFICANT CHANGE UP (ref 1.6–2.6)
MCHC RBC-ENTMCNC: 26.6 PG — LOW (ref 27–34)
MCHC RBC-ENTMCNC: 29 GM/DL — LOW (ref 32–36)
MCV RBC AUTO: 91.8 FL — SIGNIFICANT CHANGE UP (ref 80–100)
MRSA PCR RESULT.: DETECTED
NRBC # BLD: 0 /100 WBCS — SIGNIFICANT CHANGE UP (ref 0–0)
PHOSPHATE SERPL-MCNC: 3.4 MG/DL — SIGNIFICANT CHANGE UP (ref 2.5–4.5)
PLATELET # BLD AUTO: 177 K/UL — SIGNIFICANT CHANGE UP (ref 150–400)
POTASSIUM SERPL-MCNC: 4.5 MMOL/L — SIGNIFICANT CHANGE UP (ref 3.5–5.3)
POTASSIUM SERPL-SCNC: 4.5 MMOL/L — SIGNIFICANT CHANGE UP (ref 3.5–5.3)
PROCALCITONIN SERPL-MCNC: 0.17 NG/ML — HIGH (ref 0.02–0.1)
PROTHROM AB SERPL-ACNC: 21.6 SEC — HIGH (ref 9.8–12.7)
RBC # BLD: 3.19 M/UL — LOW (ref 3.8–5.2)
RBC # FLD: 19.7 % — HIGH (ref 10.3–14.5)
S AUREUS DNA NOSE QL NAA+PROBE: DETECTED
SODIUM SERPL-SCNC: 147 MMOL/L — HIGH (ref 135–145)
WBC # BLD: 7.26 K/UL — SIGNIFICANT CHANGE UP (ref 3.8–10.5)
WBC # FLD AUTO: 7.26 K/UL — SIGNIFICANT CHANGE UP (ref 3.8–10.5)

## 2018-07-02 PROCEDURE — 99233 SBSQ HOSP IP/OBS HIGH 50: CPT

## 2018-07-02 RX ORDER — WARFARIN SODIUM 2.5 MG/1
2 TABLET ORAL ONCE
Qty: 0 | Refills: 0 | Status: COMPLETED | OUTPATIENT
Start: 2018-07-02 | End: 2018-07-02

## 2018-07-02 RX ORDER — NYSTATIN CREAM 100000 [USP'U]/G
1 CREAM TOPICAL THREE TIMES A DAY
Qty: 0 | Refills: 0 | Status: DISCONTINUED | OUTPATIENT
Start: 2018-07-02 | End: 2018-07-07

## 2018-07-02 RX ORDER — BACITRACIN ZINC 500 UNIT/G
1 OINTMENT IN PACKET (EA) TOPICAL
Qty: 0 | Refills: 0 | Status: DISCONTINUED | OUTPATIENT
Start: 2018-07-02 | End: 2018-07-07

## 2018-07-02 RX ORDER — ATROPINE SULFATE 0.1 MG/ML
0.5 SYRINGE (ML) INJECTION ONCE
Qty: 0 | Refills: 0 | Status: COMPLETED | OUTPATIENT
Start: 2018-07-02 | End: 2018-07-03

## 2018-07-02 RX ADMIN — PIPERACILLIN AND TAZOBACTAM 25 GRAM(S): 4; .5 INJECTION, POWDER, LYOPHILIZED, FOR SOLUTION INTRAVENOUS at 01:32

## 2018-07-02 RX ADMIN — PANTOPRAZOLE SODIUM 40 MILLIGRAM(S): 20 TABLET, DELAYED RELEASE ORAL at 11:57

## 2018-07-02 RX ADMIN — WARFARIN SODIUM 2 MILLIGRAM(S): 2.5 TABLET ORAL at 22:07

## 2018-07-02 RX ADMIN — Medication 3 MILLILITER(S): at 11:25

## 2018-07-02 RX ADMIN — Medication 3 MILLILITER(S): at 23:50

## 2018-07-02 RX ADMIN — Medication 40 MILLIGRAM(S): at 06:16

## 2018-07-02 RX ADMIN — NYSTATIN CREAM 1 APPLICATION(S): 100000 CREAM TOPICAL at 14:22

## 2018-07-02 RX ADMIN — QUETIAPINE FUMARATE 25 MILLIGRAM(S): 200 TABLET, FILM COATED ORAL at 22:07

## 2018-07-02 RX ADMIN — HEPARIN SODIUM 700 UNIT(S)/HR: 5000 INJECTION INTRAVENOUS; SUBCUTANEOUS at 09:13

## 2018-07-02 RX ADMIN — Medication 3 MILLILITER(S): at 05:07

## 2018-07-02 RX ADMIN — PIPERACILLIN AND TAZOBACTAM 25 GRAM(S): 4; .5 INJECTION, POWDER, LYOPHILIZED, FOR SOLUTION INTRAVENOUS at 11:57

## 2018-07-02 RX ADMIN — NYSTATIN CREAM 1 APPLICATION(S): 100000 CREAM TOPICAL at 22:06

## 2018-07-02 RX ADMIN — Medication 3 MILLILITER(S): at 17:16

## 2018-07-02 NOTE — PROGRESS NOTE ADULT - ASSESSMENT
1.  Acute metabolic encephalopathy superimposed on dementia - improved.    Resume outpatient dementia medications.    2.  Acute respiratory failure - improved.  Pneumonia with sepsis.  Discussed with Dr. Lobo about home BIPAP.  Taper steroids.  Change Zosyn to oral i 24 hours.    3.  Hypernatremia - improving.  Decrease D5W to 50 ml/hr.  [place] thick water.    4.  History of PE.  Continue IV heparin until INR therapeutic.  Warfarin today.    Case management assisting with discharge plans, family needs new Gsiela lift.

## 2018-07-02 NOTE — PROGRESS NOTE ADULT - SUBJECTIVE AND OBJECTIVE BOX
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 As per family, patient also with fevers, lethargy and poor oral intake last few days. No hematemesis, melena or rectal bleeding.    HOSPITAL COURSE 6/28/2018 ADMISSION [facility]   On arrival pt was found to have CO2 retn and was placed on BPAP Her INR was supratherapeutic but repeat one several hr later came down to 1.65   Pt was started empirically on zosyn for poss asp pneum    CXR 6/29 showed rll opac suggesting asp pneum 6/28 uc showed Pseudomonas and this is possible  coetiology of sepsis Hypernatremia was Rxed with Hypotonic fluids and improved along with creatinine improvement Pt was place don iv ufh for ho pe on 6/29 BPAP was dced 6/29 Dys 1 puree nectar diet started 6/30/2018     IVF  D5 85 (6/29/2018)    VITALS/LABS                           7/2/2018 afeb 66 112/86 18 96%   7/2/2018 W 7.2 Hb 8.5 Plt 177  Na 147 K 4.5 CO2 24 Cr .9      REVIEW OF SYMPTOMS    Able to give ROS  NO     PHYSICAL EXAM    HEENT Unremarkable PERRLA atraumatic   RESP Fair air entry EXP prolonged    Harsh breath sound Resp distres mild   CARDIAC S1 S2 No S3     NO JVD    ABDOMEN SOFT BS PRESENT NOT DISTENDED No hepatosplenomegaly PEDAL EDEMA present No calf tenderness  NO rash     ASSESSMENT RECOMMENDATIONS  6/28/2018 [facility]ION [facility]                         RESP   6/30/2018 3l 740/41/92   6/30/2018 Pt had alveolar hypoventilation poa but gas excange is acceptable Cont BPAP periodically and at night if tolerated  and as needed to decrease work of breathing   COPD  Duoneb.4 (6/28)   Solumed 40 (6/29)   6/29/2018 Decreased solumed to 40/d on 6/29  SEPSIS PSUEDOMONAS (6/28 UC) UTI   SEPSIS RLL ASP PNEUMONIA   6/28-6/29-6/30-7/1 W 16-10-9.4-8.3  Imroving   On  Zosyn (6/28)     DYSPHAGIA   6/29 Speech eval Dys 1 Puree Lanare (6/29)   HO PE   On IV UFH and coumadin     HYPERNATREMIA   6/28-6/29-6/30-7/1-7/2 Na 157 - 156 - 152-149 -147 Improving  6/28/2018 On D5  Cont to monitor serially   SUSAN  6/28-6/29-6/30-7/1 Cr 3.1 -1.78-1.4 - .9  Improving  AMS   6/28/2018 CT head n   AGITATION  Quetiapine 25 (6/28)     GLOBAL ISSUE/BEST PRACTICE:      PROBLEM: HOB elevation:   y            PROBLEM: Stress ulcer proph:    protonix 40 (6/28)                       PROBLEM: VTE prophylaxis:      Hep inf (6/29/2018) (HO pe avr) warfarin (6/30/2018)   PROBLEM: Glycemic control:    na  PROBLEM: Nutrition:    npo (6/28) --> Dys 1 puree Nectar (6/29)   PROBLEM: Advanced directive: na     PROBLEM: Allergies:  na      TIME SPENT Over 25 minutes aggregate care time spent on encounter; activities included   direct patient care, counseling and/or coordinating care reviewing notes, lab data/ imaging , discussion with multidisciplinary team/ patient  /family

## 2018-07-02 NOTE — PROGRESS NOTE ADULT - SUBJECTIVE AND OBJECTIVE BOX
Patient is a 89y old  Female who presents with a chief complaint of Sent by MD for elevated INR. (28 Jun 2018 02:29)      INTERVAL HPI/OVERNIGHT EVENTS:    MEDICATIONS  (STANDING):  ALBUTerol/ipratropium for Nebulization 3 milliLiter(s) Nebulizer every 6 hours  dextrose 5%. 1000 milliLiter(s) (85 mL/Hr) IV Continuous <Continuous>  heparin  Infusion.  Unit(s)/Hr (9 mL/Hr) IV Continuous <Continuous>  methylPREDNISolone sodium succinate Injectable 40 milliGRAM(s) IV Push daily  nystatin Powder 1 Application(s) Topical three times a day  pantoprazole  Injectable 40 milliGRAM(s) IV Push daily  piperacillin/tazobactam IVPB. 3.375 Gram(s) IV Intermittent every 12 hours  QUEtiapine 25 milliGRAM(s) Oral at bedtime  warfarin 2 milliGRAM(s) Oral once    MEDICATIONS  (PRN):  heparin  Injectable 4000 Unit(s) IV Push every 6 hours PRN For aPTT less than 40  heparin  Injectable 2000 Unit(s) IV Push every 6 hours PRN For aPTT between 40 - 57      Allergies    Haldol (Unknown)    Intolerances        REVIEW OF SYSTEMS:  CONSTITUTIONAL: No fever, weight loss, or fatigue  EYES: No eye pain, visual disturbances, or discharge  ENMT:  No difficulty hearing, tinnitus, vertigo; No sinus or throat pain  NECK: No pain or stiffness  BREASTS: No pain, masses, or nipple discharge  RESPIRATORY: No cough, wheezing, chills or hemoptysis; No shortness of breath  CARDIOVASCULAR: No chest pain, palpitations, dizziness, or leg swelling  GASTROINTESTINAL: No abdominal or epigastric pain. No nausea, vomiting, or hematemesis; No diarrhea or constipation. No melena or hematochezia.  GENITOURINARY: No dysuria, frequency, hematuria, or incontinence  NEUROLOGICAL: No headaches, memory loss, loss of strength, numbness, or tremors  SKIN: No itching, burning, rashes, or lesions   LYMPH NODES: No enlarged glands  ENDOCRINE: No heat or cold intolerance; No hair loss  MUSCULOSKELETAL: No joint pain or swelling; No muscle, back, or extremity pain  PSYCHIATRIC: No depression, anxiety, mood swings, or difficulty sleeping  HEME/LYMPH: No easy bruising, or bleeding gums  ALLERGY AND IMMUNOLOGIC: No hives or eczema    Vital Signs Last 24 Hrs  T(C): 36.3 (02 Jul 2018 10:40), Max: 36.7 (01 Jul 2018 23:55)  T(F): 97.4 (02 Jul 2018 10:40), Max: 98 (01 Jul 2018 23:55)  HR: 66 (02 Jul 2018 10:40) (45 - 90)  BP: 112/86 (02 Jul 2018 10:40) (105/47 - 142/67)  BP(mean): --  RR: 18 (02 Jul 2018 10:40) (18 - 20)  SpO2: 96% (02 Jul 2018 10:40) (92% - 97%)    PHYSICAL EXAM:  GENERAL: NAD, well-groomed, well-developed  HEAD:  Atraumatic, Normocephalic  EYES: EOMI, PERRLA, conjunctiva and sclera clear  ENMT: No tonsillar erythema, exudates, or enlargement; Moist mucous membranes, Good dentition, No lesions  NECK: Supple, No JVD, Normal thyroid  NERVOUS SYSTEM:  Alert & Oriented X3, Good concentration; Motor Strength 5/5 B/L upper and lower extremities; DTRs 2+ intact and symmetric  CHEST/LUNG: Clear to percussion bilaterally; No rales, rhonchi, wheezing, or rubs  HEART: Regular rate and rhythm; No murmurs, rubs, or gallops  ABDOMEN: Soft, Nontender, Nondistended; Bowel sounds present  EXTREMITIES:  2+ Peripheral Pulses, No clubbing, cyanosis, or edema  LYMPH: No lymphadenopathy noted  SKIN: No rashes or lesions    LABS:                        8.5    7.26  )-----------( 177      ( 02 Jul 2018 07:44 )             29.3     07-02    147<H>  |  113<H>  |  17  ----------------------------<  80  4.5   |  24  |  0.94    Ca    8.3<L>      02 Jul 2018 06:17  Phos  3.4     07-02  Mg     2.0     07-02      PT/INR - ( 02 Jul 2018 07:44 )   PT: 21.6 sec;   INR: 1.95 ratio         PTT - ( 02 Jul 2018 07:44 )  PTT:83.5 sec    CAPILLARY BLOOD GLUCOSE          Culture - Blood (collected 28 Jun 2018 09:28)  Source: .Blood Blood-Peripheral  Preliminary Report (29 Jun 2018 10:01):    No growth to date.    Culture - Blood (collected 28 Jun 2018 09:28)  Source: .Blood Blood-Peripheral  Preliminary Report (29 Jun 2018 10:01):    No growth to date.    Culture - Urine (collected 28 Jun 2018 09:25)  Source: .Urine Catheterized  Final Report (30 Jun 2018 10:12):    10,000 - 49,000 CFU/mL Pseudomonas aeruginosa  Organism: Pseudomonas aeruginosa (30 Jun 2018 10:12)  Organism: Pseudomonas aeruginosa (30 Jun 2018 10:12)      RADIOLOGY & ADDITIONAL TESTS:    Imaging Personally Reviewed:  [ ] YES  [ ] NO    Consultant(s) Notes Reviewed:  [ ] YES  [ ] NO    Care Discussed with Consultants/Other Providers [ ] YES  [ ] NO Patient is a 89y old  Female who presents with a chief complaint of Sent by MD for elevated INR. (28 Jun 2018 02:29)      INTERVAL HPI/OVERNIGHT EVENTS:  Hospitalist follow-up for:    Acute respiratory failure - on nasal cannula with stable and acceptable oxygen saturation.    Hypernatremia - serum sodium improving, patient on D5W at 85 ml/hr.    Dysphagia - tolerating pureed diet with nectar thick fluids.    MEDICATIONS  (STANDING):  ALBUTerol/ipratropium for Nebulization 3 milliLiter(s) Nebulizer every 6 hours  dextrose 5%. 1000 milliLiter(s) (85 mL/Hr) IV Continuous <Continuous>  heparin  Infusion.  Unit(s)/Hr (9 mL/Hr) IV Continuous <Continuous>  methylPREDNISolone sodium succinate Injectable 40 milliGRAM(s) IV Push daily  nystatin Powder 1 Application(s) Topical three times a day  pantoprazole  Injectable 40 milliGRAM(s) IV Push daily  piperacillin/tazobactam IVPB. 3.375 Gram(s) IV Intermittent every 12 hours  QUEtiapine 25 milliGRAM(s) Oral at bedtime  warfarin 2 milliGRAM(s) Oral once    MEDICATIONS  (PRN):  heparin  Injectable 4000 Unit(s) IV Push every 6 hours PRN For aPTT less than 40  heparin  Injectable 2000 Unit(s) IV Push every 6 hours PRN For aPTT between 40 - 57      Allergies    Haldol (Unknown)    Intolerances        REVIEW OF SYSTEMS:  Unable to obtain due to baseline mental status/dementia.    Vital Signs Last 24 Hrs  T(C): 36.3 (02 Jul 2018 10:40), Max: 36.7 (01 Jul 2018 23:55)  T(F): 97.4 (02 Jul 2018 10:40), Max: 98 (01 Jul 2018 23:55)  HR: 66 (02 Jul 2018 10:40) (45 - 90)  BP: 112/86 (02 Jul 2018 10:40) (105/47 - 142/67)  BP(mean): --  RR: 18 (02 Jul 2018 10:40) (18 - 20)  SpO2: 96% (02 Jul 2018 10:40) (92% - 97%)    PHYSICAL EXAM:  GENERAL: NAD, awake.  NERVOUS SYSTEM:   Unable to assess, not cooperative.  CHEST/LUNG: Clear anteriorly, diminished at bases.  No wheezes or rhonchi noted.  HEART: Regular rate and rhythm; No murmurs, rubs, or gallops  ABDOMEN: Soft, Nontender, Nondistended; Bowel sounds present  EXTREMITIES:   No clubbing, cyanosis, or edema    LABS:                        8.5    7.26  )-----------( 177      ( 02 Jul 2018 07:44 )             29.3     07-02    147<H>  |  113<H>  |  17  ----------------------------<  80  4.5   |  24  |  0.94    Ca    8.3<L>      02 Jul 2018 06:17  Phos  3.4     07-02  Mg     2.0     07-02      PT/INR - ( 02 Jul 2018 07:44 )   PT: 21.6 sec;   INR: 1.95 ratio         PTT - ( 02 Jul 2018 07:44 )  PTT:83.5 sec    CAPILLARY BLOOD GLUCOSE          Culture - Blood (collected 28 Jun 2018 09:28)  Source: .Blood Blood-Peripheral  Preliminary Report (29 Jun 2018 10:01):    No growth to date.    Culture - Blood (collected 28 Jun 2018 09:28)  Source: .Blood Blood-Peripheral  Preliminary Report (29 Jun 2018 10:01):    No growth to date.    Culture - Urine (collected 28 Jun 2018 09:25)  Source: .Urine Catheterized  Final Report (30 Jun 2018 10:12):    10,000 - 49,000 CFU/mL Pseudomonas aeruginosa  Organism: Pseudomonas aeruginosa (30 Jun 2018 10:12)  Organism: Pseudomonas aeruginosa (30 Jun 2018 10:12)      RADIOLOGY & ADDITIONAL TESTS:    Imaging Personally Reviewed:  [ ] YES  [ ] NO    Consultant(s) Notes Reviewed:  [X ] YES  [ ] NO    Care Discussed with Consultants/Other Providers [ ] YES  [ ] NO

## 2018-07-03 LAB
ALBUMIN SERPL ELPH-MCNC: 2.6 G/DL — LOW (ref 3.3–5)
ALP SERPL-CCNC: 47 U/L — SIGNIFICANT CHANGE UP (ref 40–120)
ALT FLD-CCNC: 14 U/L — SIGNIFICANT CHANGE UP (ref 12–78)
ANION GAP SERPL CALC-SCNC: 10 MMOL/L — SIGNIFICANT CHANGE UP (ref 5–17)
APTT BLD: 24.4 SEC — LOW (ref 27.5–37.4)
APTT BLD: 39.1 SEC — HIGH (ref 27.5–37.4)
AST SERPL-CCNC: 29 U/L — SIGNIFICANT CHANGE UP (ref 15–37)
BASE EXCESS BLDA CALC-SCNC: 3.8 MMOL/L — HIGH (ref -2–2)
BASOPHILS # BLD AUTO: 0.03 K/UL — SIGNIFICANT CHANGE UP (ref 0–0.2)
BASOPHILS NFR BLD AUTO: 0.4 % — SIGNIFICANT CHANGE UP (ref 0–2)
BILIRUB SERPL-MCNC: 0.4 MG/DL — SIGNIFICANT CHANGE UP (ref 0.2–1.2)
BLOOD GAS COMMENTS: SIGNIFICANT CHANGE UP
BLOOD GAS COMMENTS: SIGNIFICANT CHANGE UP
BLOOD GAS SOURCE: SIGNIFICANT CHANGE UP
BUN SERPL-MCNC: 15 MG/DL — SIGNIFICANT CHANGE UP (ref 7–23)
CALCIUM SERPL-MCNC: 8.5 MG/DL — SIGNIFICANT CHANGE UP (ref 8.5–10.1)
CHLORIDE SERPL-SCNC: 109 MMOL/L — HIGH (ref 96–108)
CK SERPL-CCNC: 31 U/L — SIGNIFICANT CHANGE UP (ref 26–192)
CLOSURE TME COLL+EPINEP BLD: 165 K/UL — SIGNIFICANT CHANGE UP (ref 150–400)
CO2 SERPL-SCNC: 25 MMOL/L — SIGNIFICANT CHANGE UP (ref 22–31)
CREAT SERPL-MCNC: 0.89 MG/DL — SIGNIFICANT CHANGE UP (ref 0.5–1.3)
CULTURE RESULTS: SIGNIFICANT CHANGE UP
CULTURE RESULTS: SIGNIFICANT CHANGE UP
EOSINOPHIL # BLD AUTO: 0.2 K/UL — SIGNIFICANT CHANGE UP (ref 0–0.5)
EOSINOPHIL NFR BLD AUTO: 2.4 % — SIGNIFICANT CHANGE UP (ref 0–6)
GLUCOSE SERPL-MCNC: 76 MG/DL — SIGNIFICANT CHANGE UP (ref 70–99)
HCO3 BLDA-SCNC: 28 MMOL/L — SIGNIFICANT CHANGE UP (ref 21–29)
HCT VFR BLD CALC: 31.5 % — LOW (ref 34.5–45)
HGB BLD-MCNC: 9.3 G/DL — LOW (ref 11.5–15.5)
HOROWITZ INDEX BLDA+IHG-RTO: 60 — SIGNIFICANT CHANGE UP
IMM GRANULOCYTES NFR BLD AUTO: 5.7 % — HIGH (ref 0–1.5)
INR BLD: 2.33 RATIO — HIGH (ref 0.88–1.16)
LACTATE SERPL-SCNC: 2.3 MMOL/L — HIGH (ref 0.7–2)
LYMPHOCYTES # BLD AUTO: 1.55 K/UL — SIGNIFICANT CHANGE UP (ref 1–3.3)
LYMPHOCYTES # BLD AUTO: 18.5 % — SIGNIFICANT CHANGE UP (ref 13–44)
MAGNESIUM SERPL-MCNC: 2 MG/DL — SIGNIFICANT CHANGE UP (ref 1.6–2.6)
MCHC RBC-ENTMCNC: 26.6 PG — LOW (ref 27–34)
MCHC RBC-ENTMCNC: 29.5 GM/DL — LOW (ref 32–36)
MCV RBC AUTO: 90 FL — SIGNIFICANT CHANGE UP (ref 80–100)
MONOCYTES # BLD AUTO: 0.44 K/UL — SIGNIFICANT CHANGE UP (ref 0–0.9)
MONOCYTES NFR BLD AUTO: 5.3 % — SIGNIFICANT CHANGE UP (ref 2–14)
NEUTROPHILS # BLD AUTO: 5.66 K/UL — SIGNIFICANT CHANGE UP (ref 1.8–7.4)
NEUTROPHILS NFR BLD AUTO: 67.7 % — SIGNIFICANT CHANGE UP (ref 43–77)
NRBC # BLD: 0 /100 WBCS — SIGNIFICANT CHANGE UP (ref 0–0)
PCO2 BLDA: 45 MMHG — SIGNIFICANT CHANGE UP (ref 32–46)
PH BLD: 7.41 — SIGNIFICANT CHANGE UP (ref 7.35–7.45)
PHOSPHATE SERPL-MCNC: 2.7 MG/DL — SIGNIFICANT CHANGE UP (ref 2.5–4.5)
PLATELET # BLD AUTO: 68 K/UL — LOW (ref 150–400)
PO2 BLDA: 235 MMHG — HIGH (ref 74–108)
POTASSIUM SERPL-MCNC: 5.2 MMOL/L — SIGNIFICANT CHANGE UP (ref 3.5–5.3)
POTASSIUM SERPL-SCNC: 5.2 MMOL/L — SIGNIFICANT CHANGE UP (ref 3.5–5.3)
PROT SERPL-MCNC: 6.8 GM/DL — SIGNIFICANT CHANGE UP (ref 6–8.3)
PROTHROM AB SERPL-ACNC: 25.8 SEC — HIGH (ref 9.8–12.7)
RBC # BLD: 3.5 M/UL — LOW (ref 3.8–5.2)
RBC # FLD: 19.8 % — HIGH (ref 10.3–14.5)
SAO2 % BLDA: 100 % — HIGH (ref 92–96)
SODIUM SERPL-SCNC: 144 MMOL/L — SIGNIFICANT CHANGE UP (ref 135–145)
SPECIMEN SOURCE: SIGNIFICANT CHANGE UP
SPECIMEN SOURCE: SIGNIFICANT CHANGE UP
TROPONIN I SERPL-MCNC: <.015 NG/ML — SIGNIFICANT CHANGE UP (ref 0.01–0.04)
TROPONIN I SERPL-MCNC: <.015 NG/ML — SIGNIFICANT CHANGE UP (ref 0.01–0.04)
WBC # BLD: 8.36 K/UL — SIGNIFICANT CHANGE UP (ref 3.8–10.5)
WBC # FLD AUTO: 8.36 K/UL — SIGNIFICANT CHANGE UP (ref 3.8–10.5)

## 2018-07-03 PROCEDURE — 99233 SBSQ HOSP IP/OBS HIGH 50: CPT

## 2018-07-03 PROCEDURE — 99223 1ST HOSP IP/OBS HIGH 75: CPT

## 2018-07-03 PROCEDURE — 71045 X-RAY EXAM CHEST 1 VIEW: CPT | Mod: 26

## 2018-07-03 PROCEDURE — 93010 ELECTROCARDIOGRAM REPORT: CPT

## 2018-07-03 PROCEDURE — 93306 TTE W/DOPPLER COMPLETE: CPT | Mod: 26

## 2018-07-03 RX ORDER — ATROPINE SULFATE 0.1 MG/ML
0.5 SYRINGE (ML) INJECTION ONCE
Qty: 0 | Refills: 0 | Status: DISCONTINUED | OUTPATIENT
Start: 2018-07-03 | End: 2018-07-07

## 2018-07-03 RX ORDER — WARFARIN SODIUM 2.5 MG/1
2 TABLET ORAL ONCE
Qty: 0 | Refills: 0 | Status: COMPLETED | OUTPATIENT
Start: 2018-07-03 | End: 2018-07-03

## 2018-07-03 RX ORDER — PANTOPRAZOLE SODIUM 20 MG/1
40 TABLET, DELAYED RELEASE ORAL
Qty: 0 | Refills: 0 | Status: DISCONTINUED | OUTPATIENT
Start: 2018-07-03 | End: 2018-07-07

## 2018-07-03 RX ADMIN — HEPARIN SODIUM 4000 UNIT(S): 5000 INJECTION INTRAVENOUS; SUBCUTANEOUS at 05:39

## 2018-07-03 RX ADMIN — NYSTATIN CREAM 1 APPLICATION(S): 100000 CREAM TOPICAL at 21:41

## 2018-07-03 RX ADMIN — PANTOPRAZOLE SODIUM 40 MILLIGRAM(S): 20 TABLET, DELAYED RELEASE ORAL at 12:33

## 2018-07-03 RX ADMIN — Medication 0.5 MILLIGRAM(S): at 03:49

## 2018-07-03 RX ADMIN — PIPERACILLIN AND TAZOBACTAM 25 GRAM(S): 4; .5 INJECTION, POWDER, LYOPHILIZED, FOR SOLUTION INTRAVENOUS at 12:33

## 2018-07-03 RX ADMIN — Medication 40 MILLIGRAM(S): at 05:35

## 2018-07-03 RX ADMIN — Medication 3 MILLILITER(S): at 17:32

## 2018-07-03 RX ADMIN — WARFARIN SODIUM 2 MILLIGRAM(S): 2.5 TABLET ORAL at 21:41

## 2018-07-03 RX ADMIN — SODIUM CHLORIDE 85 MILLILITER(S): 9 INJECTION, SOLUTION INTRAVENOUS at 05:40

## 2018-07-03 RX ADMIN — Medication 1 APPLICATION(S): at 05:35

## 2018-07-03 RX ADMIN — SODIUM CHLORIDE 30 MILLILITER(S): 9 INJECTION, SOLUTION INTRAVENOUS at 08:22

## 2018-07-03 RX ADMIN — Medication 3 MILLILITER(S): at 05:52

## 2018-07-03 RX ADMIN — HEPARIN SODIUM 900 UNIT(S)/HR: 5000 INJECTION INTRAVENOUS; SUBCUTANEOUS at 05:36

## 2018-07-03 RX ADMIN — PIPERACILLIN AND TAZOBACTAM 25 GRAM(S): 4; .5 INJECTION, POWDER, LYOPHILIZED, FOR SOLUTION INTRAVENOUS at 01:25

## 2018-07-03 RX ADMIN — NYSTATIN CREAM 1 APPLICATION(S): 100000 CREAM TOPICAL at 14:11

## 2018-07-03 RX ADMIN — Medication 1 APPLICATION(S): at 17:20

## 2018-07-03 RX ADMIN — QUETIAPINE FUMARATE 25 MILLIGRAM(S): 200 TABLET, FILM COATED ORAL at 21:41

## 2018-07-03 RX ADMIN — Medication 1 TABLET(S): at 17:56

## 2018-07-03 RX ADMIN — NYSTATIN CREAM 1 APPLICATION(S): 100000 CREAM TOPICAL at 05:35

## 2018-07-03 RX ADMIN — Medication 3 MILLILITER(S): at 11:11

## 2018-07-03 NOTE — CHART NOTE - NSCHARTNOTEFT_GEN_A_CORE
Patient is a 89y old  Femalepmh chf , htn p/w on a/c aortic valve replacement rrt for bradycaria 31 hypoxia 85 on bipap o2 35%     Vital Signs Last 24 Hrs  T(C): 36.4 (02 Jul 2018 21:36), Max: 36.4 (02 Jul 2018 21:36)  T(F): 97.5 (02 Jul 2018 21:36), Max: 97.5 (02 Jul 2018 21:36)  HR: 61 (02 Jul 2018 23:50) (45 - 98)  BP: 117/81 (02 Jul 2018 21:36) (107/66 - 130/61)  BP(mean): --  RR: 16 (02 Jul 2018 21:36) (16 - 20)  SpO2: 95% (02 Jul 2018 23:50) (71% - 100%)    07-02    147<H>  |  113<H>  |  17  ----------------------------<  80  4.5   |  24  |  0.94    Ca    8.3<L>      02 Jul 2018 06:17  Phos  3.4     07-02  Mg     2.0     07-02                            8.5    7.26  )-----------( 177      ( 02 Jul 2018 07:44 )             29.3         PHYSICAL EXAM: adult female responsive to tactile       Eyes: 7mm sluggish     Neck: no  jvd    Breasts: no discharge     Respiratory: coarse breath sounds of bipap otherwise clear     Cardiovascular: s1s2 no mrg     Gastrointestinal: soft bs+nt nd       Extremities: dp /pt 1+ b/l no edema       Neurological: alert to tactile in 10 minutes after bipap adjusted and atropone given pt opened eyes spontaneously and became more responsive     Skin: no sacral decubiti             plan: hypoxia -- rapidly improved after tactile stimuli   Blood Gas Profile - Arterial (07.03.18 @ 03:51)    Blood Gas Source: Arterial    Blood Gas Comments: no hematoma present.    pH, Blood: 7.41    pCO2, Arterial: 45 mmHg    pO2, Arterial: 235 mmHg    HCO3, Arterial: 28 mmol/L    Base Excess, Arterial: 3.8 mmol/L    Oxygen Saturation, Arterial: 100 %    FIO2, Arterial: 60  reduce o2 sat to 35%   bradycardia -- s/p atropine 0.5continue tele check k+ mag troponin   ekg sr 79 nl axis difficult to get good p wave reading on 2/3/f secondary to artifact   keep atropine at bedside   ams baseline non verbal dementia as per notes   b culture ordered but will not collect pending fever as pt with difficult stick at this time   rrt /plan as per Dr Funes   will get cxr lactate level and

## 2018-07-03 NOTE — CONSULT NOTE ADULT - ASSESSMENT
89 y/o female PMH of Dementia, nonverbal and chair bound, hx 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, HTN, s/p aortic valve replacement, COPD and GERD referred to ED by pmd for supra therapeutic INR. As per family, patient also with fevers, lethargy and poor oral intake last few days. No hematemesis, melena or rectal bleeding. According to family some SOB, +/- cough, no CP, no abd pain, N/V, dysuria, hematuria,  headache, neck pain, rash.  Admitted and being treated for: acute metabolic encephalopathy superimposed on dementia / Pneumonia with sepsis / Hypernatremia / Hypokalemia.  Tele: overnight briefly to 30s with Mobitz 1 Wenckebach  Currently sinus 60s.  Pt with hx of tachy-esdras arrhythmias and occasional Wenckebach second degree heart block during multiple prior admissions, otherwise hemodynamically stable.   Evaluated and deemed to not be a candidate for a PPM in the past.    -cont supportive care; abx/IVFs  -bradycardia likely multifactorial: sepsis/hypernatremia/hypokalemia.  No further events noted last 12 hours with correction of electrolytes and abx.  Mobitz 1 not an indication for a PPM.  Will monitor on tele.  If pt with persistent bradyarrhythmias when infection resolved and electrolytes repleted, will consider EPS/PPM at that time.    -avoid AV kelli blockers  -will sign off for now; please call back with any further questions

## 2018-07-03 NOTE — CONSULT NOTE ADULT - SUBJECTIVE AND OBJECTIVE BOX
CARDIOLOGY CONSULT NOTE    Patient is a 89y Female with a known history of :  Hypernatremia (E87.0)  SUSAN (acute kidney injury) (N17.9)  Chronic obstructive pulmonary disease, unspecified COPD type (J44.9)  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 (G30.8)  Dehydration (E86.0)  Essential hypertension (I10)  Supratherapeutic INR (R79.1)  Pneumonia, unspecified organism (J18.9)    HPI:  89 y/o female PMH of Dementia, nonverbal and chair bound, hx 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, HTN, s/p aortic valve replacement, COPD and GERD referred to ED by pmd for supra therapeutic INR. As per family, patient also with fevers, lethargy and poor oral intake last few days. No hematemesis, melena or rectal bleeding. According to family some SOB, +/- cough, no CP, no abd pain, N/V, dysuria, hematuria,  headache, neck pain, rash.  Admitted and being treated for: acute metabolic encephalopathy superimposed on dementia / Pneumonia with sepsis / Hypernatremia / Hypokalemia.  Tele: overnight briefly to 30s with Mobitz 1 Wenckebach  Currently sinus 60s.  Pt with hx of tachy-esdras arrhythmias and occasional Wenckebach second degree heart block during multiple prior admissions, otherwise hemodynamically stable.   Evaluated and deemed to not be a candidate for a PPM in the past.      REVIEW OF SYSTEMS:  unable to obtain from pt; non-verbal     ALLERGY AND IMMUNOLOGIC: No hives or eczema    MEDICATIONS  (STANDING):  ALBUTerol/ipratropium for Nebulization 3 milliLiter(s) Nebulizer every 6 hours  BACItracin   Ointment 1 Application(s) Topical two times a day  dextrose 5%. 1000 milliLiter(s) (30 mL/Hr) IV Continuous <Continuous>  methylPREDNISolone sodium succinate Injectable 40 milliGRAM(s) IV Push daily  nystatin Powder 1 Application(s) Topical three times a day  pantoprazole  Injectable 40 milliGRAM(s) IV Push daily  piperacillin/tazobactam IVPB. 3.375 Gram(s) IV Intermittent every 12 hours  QUEtiapine 25 milliGRAM(s) Oral at bedtime    MEDICATIONS  (PRN):  atropine Syringe 0.5 milliGRAM(s) IV Push once PRN symptomatic bradycrdia      ALLERGIES: Haldol (Unknown)      FAMILY HISTORY:  No pertinent family history in first degree relatives      PHYSICAL EXAMINATION:  -----------------------------  T(C): 36.1 (07-03-18 @ 11:26), Max: 36.6 (07-03-18 @ 01:32)  HR: 64 (07-03-18 @ 12:13) (39 - 98)  BP: 148/45 (07-03-18 @ 11:26) (116/70 - 148/45)  RR: 17 (07-03-18 @ 11:26) (16 - 18)  SpO2: 96% (07-03-18 @ 12:13) (70% - 100%)  Wt(kg): --    07-02 @ 07:01  -  07-03 @ 07:00  --------------------------------------------------------  IN:    dextrose 5%.: 1020 mL    heparin  Infusion.: 63 mL    Oral Fluid: 480 mL    Solution: 200 mL  Total IN: 1763 mL    OUT:    Voided: 2 mL  Total OUT: 2 mL    Total NET: 1761 mL            Constitutional: frail in bed but in no acute distress.   Eyes: the conjunctiva exhibited no abnormalities and the eyelids demonstrated no xanthelasmas.   HEENT: normal oral mucosa, no oral pallor and no oral cyanosis.   Neck: normal jugular venous A waves present, normal jugular venous V waves present and no jugular venous pichardo A waves.   Pulmonary: decreased BS at bases; no rales  Cardiovascular: heart rate and rhythm were normal, normal S1 and S2; 2/6 SM  Abdomen: soft, non-tender, no hepato-splenomegaly and no abdominal mass palpated.   Musculoskeletal: the gait could not be assessed..   Extremities: no clubbing of the fingernails, no localized cyanosis, no petechial hemorrhages and no ischemic changes.   Skin: normal skin color and pigmentation, no rash, no venous stasis, no skin lesions, no skin ulcer and no xanthoma was observed.   Psychiatric: non-verbal    LABS:   --------  07-03    144  |  109<H>  |  15  ----------------------------<  76  5.2   |  25  |  0.89    Ca    8.5      03 Jul 2018 04:29  Phos  2.7     07-03  Mg     2.0     07-03    TPro  6.8  /  Alb  2.6<L>  /  TBili  0.4  /  DBili  x   /  AST  29  /  ALT  14  /  AlkPhos  47  07-03                         9.3    8.36  )-----------( 68       ( 03 Jul 2018 04:29 )             31.5     PT/INR - ( 03 Jul 2018 04:29 )   PT: 25.8 sec;   INR: 2.33 ratio         PTT - ( 03 Jul 2018 04:29 )  PTT:24.4 sec    07-03 @ 04:29 CPK total:--, CKMB --, Troponin I - <.015 ng/mL          RADIOLOGY:  -----------------    ECG: NSR 79bpm    < from: TTE Echo Doppler w/o Cont (07.05.17 @ 09:59) >   1. Left ventricular ejection fraction, by visual estimation, is 60 to   65%.   2. Normal global left ventricular systolic function.   3. Normal left ventricular internal cavity size.   4. Normal left ventricular size and wall thicknesses, with normal   systolic and diastolic function.   5. Normal right ventricular size and function.   6. The left atrium is normal in size.   7. The right atrium is normal in size.   8. There is no evidence of pericardial effusion.   9. No evidence of mitral valve regurgitation.  10. Structurally normal tricuspid valve, with normal leaflet excursion.  11. Sclerotic aortic valve with normal opening.  12. Structurally normal pulmonic valve, with normal leaflet excursion.  13. Pulmonary hypertension is absent.  14. The main pulmonary artery is normal in size.    < end of copied text >

## 2018-07-03 NOTE — PROGRESS NOTE ADULT - SUBJECTIVE AND OBJECTIVE BOX
Patient is a 89y old  Female who presents with a chief complaint of Sent by MD for elevated INR. (28 Jun 2018 02:29)      INTERVAL HPI/OVERNIGHT EVENTS:    Events overnight noted.  Had rapid response for bradycardia with heart rate to 30s and required BIPAP transiently, now back to nasal cannula.  Patient's mental / cognitive status unchanged, at baseline dementia.  Mumbles words, doesn't communicate.  Patient was seen early this morning.    On D5W for hypernatremia - daily improvement.    MEDICATIONS  (STANDING):  ALBUTerol/ipratropium for Nebulization 3 milliLiter(s) Nebulizer every 6 hours  BACItracin   Ointment 1 Application(s) Topical two times a day  dextrose 5%. 1000 milliLiter(s) (30 mL/Hr) IV Continuous <Continuous>  methylPREDNISolone sodium succinate Injectable 40 milliGRAM(s) IV Push daily  nystatin Powder 1 Application(s) Topical three times a day  pantoprazole  Injectable 40 milliGRAM(s) IV Push daily  piperacillin/tazobactam IVPB. 3.375 Gram(s) IV Intermittent every 12 hours  QUEtiapine 25 milliGRAM(s) Oral at bedtime    MEDICATIONS  (PRN):  atropine Syringe 0.5 milliGRAM(s) IV Push once PRN symptomatic bradycrdia      Allergies    Haldol (Unknown)    Intolerances        REVIEW OF SYSTEMS:  Unable to obtain due to baseline dementia.    Vital Signs Last 24 Hrs  T(C): 36.1 (03 Jul 2018 11:26), Max: 36.6 (03 Jul 2018 01:32)  T(F): 97 (03 Jul 2018 11:26), Max: 97.8 (03 Jul 2018 01:32)  HR: 64 (03 Jul 2018 12:13) (39 - 98)  BP: 148/45 (03 Jul 2018 11:26) (116/70 - 148/45)  BP(mean): --  RR: 17 (03 Jul 2018 11:26) (16 - 18)  SpO2: 96% (03 Jul 2018 12:13) (70% - 100%)    PHYSICAL EXAM:  GENERAL: NAD, awake.  NERVOUS SYSTEM:   Unable to assess, not cooperative.  CHEST/LUNG: Clear anteriorly, diminished at bases.  No wheezes or rhonchi noted.  HEART: Regular rate and rhythm; No murmurs, rubs, or gallops  ABDOMEN: Soft, Nontender, Nondistended; Bowel sounds present  EXTREMITIES:   No clubbing, cyanosis, or edema    LABS:                        9.3    8.36  )-----------( 68       ( 03 Jul 2018 04:29 )             31.5     07-03    144  |  109<H>  |  15  ----------------------------<  76  5.2   |  25  |  0.89    Ca    8.5      03 Jul 2018 04:29  Phos  2.7     07-03  Mg     2.0     07-03    TPro  6.8  /  Alb  2.6<L>  /  TBili  0.4  /  DBili  x   /  AST  29  /  ALT  14  /  AlkPhos  47  07-03    PT/INR - ( 03 Jul 2018 04:29 )   PT: 25.8 sec;   INR: 2.33 ratio         PTT - ( 03 Jul 2018 04:29 )  PTT:24.4 sec    CAPILLARY BLOOD GLUCOSE      POCT Blood Glucose.: 91 mg/dL (03 Jul 2018 03:40)      RADIOLOGY & ADDITIONAL TESTS:    Imaging Personally Reviewed:  [ ] YES  [ ] NO    Consultant(s) Notes Reviewed:  [ ] YES  [ ] NO    Care Discussed with Consultants/Other Providers [ ] YES  [ ] NO

## 2018-07-03 NOTE — PROGRESS NOTE ADULT - SUBJECTIVE AND OBJECTIVE BOX
IVF  D5 30 (7/2/2018)    VITALS/LABS                           7/3/2018 afeb 65 140/70 16 98%   7/3/2018 9a 12/6/.35   7/3/2018 .6/12/6  741/45/235  (Done after RRT)  7/3/2018 W 8.3 Hb 9.3 Plt 68 INR 2.33 Na 144 K 5.2 CO2 25 Cr .8   7/3/2018 .6 741/45/235   7/3/2018 CXR Decr r pl effsn persistent r base atelectasis or pneumonia     REVIEW OF SYMPTOMS    Able to give ROS  NO     PHYSICAL EXAM    HEENT Unremarkable PERRLA atraumatic   RESP Fair air entry EXP prolonged    Harsh breath sound Resp distres mild   CARDIAC S1 S2 No S3     NO JVD    ABDOMEN SOFT BS PRESENT NOT DISTENDED No hepatosplenomegaly PEDAL EDEMA present No calf tenderness  NO rash     PATIENT DESCRIPTION 6/28/2018 ADMISSION [facility]   89 y/o female PMH of Dementia, nonverbal and chair bound, hx  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, HTN, CHF, s/p aortic valve replacement, COPD and GERD was admitted 6/28/2018 with elevated INR (coumadin) CO2 retn (rxed with BPAP) RLL asp pneumonia (Rxed with zosyn [6/28])    HOSPITAL COURSE 6/28/2018 ADMISSION [facility]    6/28 uc showed Pseudomonas and this is possible  coetiology of sepsis Hypernatremia was Rxed with Hypotonic fluids and improved along with creatinine improvement Pt was place don iv ufh for ho pe on 6/29 BPAP was dced 6/29 Dys 1 puree nectar diet started 6/30/2018     Episode of hypoxia and bradyarrhythmia occurred 7/3/2018 and pt was placed back on BPAP     ASSESSMENT RECOMMENDATIONS  6/28/2018 ADMISSION [facility]                         RESP   7/3/2018 .6/12/6  741/45/235  (Done after RRT)  7/3/2018 9a 12/6/.35    Pt had alveolar hypoventilation poa but gas excange is acceptable Cont BPAP periodically and at night if tolerated  and as needed to decrease work of breathing   COPD  Duoneb.4 (6/28)   Solumed 40 (6/29) Cont Rx   SEPSIS PSUEDOMONAS (6/28 UC) UTI   SEPSIS RLL ASP PNEUMONIA   6/28-6/29-6/30-7/1-7/3 W 16-10-9.4-8.3-8.3  Imroving   On  Zosyn (6/28)     DYSPHAGIA   6/29 Speech eval Dys 1 Puree [place] (6/29)   HO PE   On IV UFH and coumadin   EPISODE OF BRADYCARDIA AND HYPOXIA 7/3 3A   7/3/2018 Managed with atropine  7/3/2018 Check TSH Check C enz Check ECHO     HYPERNATREMIA   6/28-6/29-6/30-7/1-7/2-7/3 Na 157 - 156 - 152-149 -147-144 Improving  6/28/2018 On D5  Cont to monitor serially   SUSAN  6/28-6/29-6/30-7/1-7/3 Cr 3.1 -1.78-1.4 - .9 - .8 Improving  AMS   6/28/2018 CT head n   AGITATION  Quetiapine 25 (6/28)     GLOBAL ISSUE/BEST PRACTICE:      PROBLEM: HOB elevation:   y            PROBLEM: Stress ulcer proph:    protonix 40 (6/28)                       PROBLEM: VTE prophylaxis:      Hep inf (6/29/2018) (HO pe avr) warfarin (6/30/2018)   PROBLEM: Glycemic control:    na  PROBLEM: Nutrition:    npo (6/28) --> Dys 1 puree Nectar (6/29)   PROBLEM: Advanced directive: na     PROBLEM: Allergies:  na      TIME SPENT Over 25 minutes aggregate care time spent on encounter; activities included   direct patient care, counseling and/or coordinating care reviewing notes, lab data/ imaging , discussion with multidisciplinary team/ patient  /family

## 2018-07-03 NOTE — PROGRESS NOTE ADULT - ASSESSMENT
1.  Bradycardia - records show patient with history of bradycardia, Type II block and seen by cardiology in the past, deemed poor candidate for PPM.  Discussed patient's condition with family and they wish to proceed with cardiology evaluation.  Discussed with cardiologist, no indication for PPM at this time.    2.  Acute metabolic encephalopathy superimposed on dementia - improved.    Resume outpatient dementia medications.    3.  Acute respiratory failure - improved.  Pneumonia with sepsis.  Discuss with Dr. Lobo about home BIPAP.  Taper steroids.  Change Zosyn to oral Augmentin to complete 7 days.    4.  Hypernatremia - improving.  Decrease D5W to 30 ml/hr.  [place] thick water.    5.  History of PE.  Stop IV heparin as INR therapeutic.  Warfarin today.

## 2018-07-04 LAB
ALBUMIN SERPL ELPH-MCNC: 2.6 G/DL — LOW (ref 3.3–5)
ALP SERPL-CCNC: 46 U/L — SIGNIFICANT CHANGE UP (ref 40–120)
ALT FLD-CCNC: 14 U/L — SIGNIFICANT CHANGE UP (ref 12–78)
ANION GAP SERPL CALC-SCNC: 7 MMOL/L — SIGNIFICANT CHANGE UP (ref 5–17)
AST SERPL-CCNC: 18 U/L — SIGNIFICANT CHANGE UP (ref 15–37)
BASE EXCESS BLDA CALC-SCNC: 5.2 MMOL/L — HIGH (ref -2–2)
BILIRUB SERPL-MCNC: 0.2 MG/DL — SIGNIFICANT CHANGE UP (ref 0.2–1.2)
BLOOD GAS COMMENTS: SIGNIFICANT CHANGE UP
BLOOD GAS COMMENTS: SIGNIFICANT CHANGE UP
BLOOD GAS SOURCE: SIGNIFICANT CHANGE UP
BUN SERPL-MCNC: 15 MG/DL — SIGNIFICANT CHANGE UP (ref 7–23)
CALCIUM SERPL-MCNC: 8.6 MG/DL — SIGNIFICANT CHANGE UP (ref 8.5–10.1)
CHLORIDE SERPL-SCNC: 107 MMOL/L — SIGNIFICANT CHANGE UP (ref 96–108)
CK SERPL-CCNC: 26 U/L — SIGNIFICANT CHANGE UP (ref 26–192)
CO2 SERPL-SCNC: 32 MMOL/L — HIGH (ref 22–31)
CREAT SERPL-MCNC: 0.87 MG/DL — SIGNIFICANT CHANGE UP (ref 0.5–1.3)
GLUCOSE SERPL-MCNC: 82 MG/DL — SIGNIFICANT CHANGE UP (ref 70–99)
HCO3 BLDA-SCNC: 29 MMOL/L — SIGNIFICANT CHANGE UP (ref 21–29)
HCT VFR BLD CALC: 31.1 % — LOW (ref 34.5–45)
HGB BLD-MCNC: 9 G/DL — LOW (ref 11.5–15.5)
HOROWITZ INDEX BLDA+IHG-RTO: 36 — SIGNIFICANT CHANGE UP
INR BLD: 2.37 RATIO — HIGH (ref 0.88–1.16)
MCHC RBC-ENTMCNC: 26.4 PG — LOW (ref 27–34)
MCHC RBC-ENTMCNC: 28.9 GM/DL — LOW (ref 32–36)
MCV RBC AUTO: 91.2 FL — SIGNIFICANT CHANGE UP (ref 80–100)
NRBC # BLD: 0 /100 WBCS — SIGNIFICANT CHANGE UP (ref 0–0)
PCO2 BLDA: 44 MMHG — SIGNIFICANT CHANGE UP (ref 32–46)
PH BLD: 7.44 — SIGNIFICANT CHANGE UP (ref 7.35–7.45)
PLATELET # BLD AUTO: 241 K/UL — SIGNIFICANT CHANGE UP (ref 150–400)
PO2 BLDA: 72 MMHG — LOW (ref 74–108)
POTASSIUM SERPL-MCNC: 3.9 MMOL/L — SIGNIFICANT CHANGE UP (ref 3.5–5.3)
POTASSIUM SERPL-SCNC: 3.9 MMOL/L — SIGNIFICANT CHANGE UP (ref 3.5–5.3)
PROCALCITONIN SERPL-MCNC: 0.09 NG/ML — SIGNIFICANT CHANGE UP (ref 0.02–0.1)
PROT SERPL-MCNC: 6.8 GM/DL — SIGNIFICANT CHANGE UP (ref 6–8.3)
PROTHROM AB SERPL-ACNC: 26.3 SEC — HIGH (ref 9.8–12.7)
RBC # BLD: 3.41 M/UL — LOW (ref 3.8–5.2)
RBC # FLD: 20.1 % — HIGH (ref 10.3–14.5)
SAO2 % BLDA: 94 % — SIGNIFICANT CHANGE UP (ref 92–96)
SODIUM SERPL-SCNC: 146 MMOL/L — HIGH (ref 135–145)
TROPONIN I SERPL-MCNC: <.015 NG/ML — SIGNIFICANT CHANGE UP (ref 0.01–0.04)
TSH SERPL-MCNC: 0.68 UIU/ML — SIGNIFICANT CHANGE UP (ref 0.36–3.74)
WBC # BLD: 8.86 K/UL — SIGNIFICANT CHANGE UP (ref 3.8–10.5)
WBC # FLD AUTO: 8.86 K/UL — SIGNIFICANT CHANGE UP (ref 3.8–10.5)

## 2018-07-04 PROCEDURE — 71045 X-RAY EXAM CHEST 1 VIEW: CPT | Mod: 26

## 2018-07-04 PROCEDURE — 99233 SBSQ HOSP IP/OBS HIGH 50: CPT

## 2018-07-04 RX ORDER — WARFARIN SODIUM 2.5 MG/1
2 TABLET ORAL ONCE
Qty: 0 | Refills: 0 | Status: COMPLETED | OUTPATIENT
Start: 2018-07-04 | End: 2018-07-04

## 2018-07-04 RX ADMIN — WARFARIN SODIUM 2 MILLIGRAM(S): 2.5 TABLET ORAL at 21:21

## 2018-07-04 RX ADMIN — PANTOPRAZOLE SODIUM 40 MILLIGRAM(S): 20 TABLET, DELAYED RELEASE ORAL at 05:10

## 2018-07-04 RX ADMIN — Medication 40 MILLIGRAM(S): at 05:11

## 2018-07-04 RX ADMIN — Medication 1 APPLICATION(S): at 17:15

## 2018-07-04 RX ADMIN — NYSTATIN CREAM 1 APPLICATION(S): 100000 CREAM TOPICAL at 13:09

## 2018-07-04 RX ADMIN — Medication 3 MILLILITER(S): at 23:37

## 2018-07-04 RX ADMIN — SODIUM CHLORIDE 65 MILLILITER(S): 9 INJECTION, SOLUTION INTRAVENOUS at 21:21

## 2018-07-04 RX ADMIN — Medication 3 MILLILITER(S): at 11:34

## 2018-07-04 RX ADMIN — Medication 3 MILLILITER(S): at 17:05

## 2018-07-04 RX ADMIN — Medication 3 MILLILITER(S): at 05:46

## 2018-07-04 RX ADMIN — NYSTATIN CREAM 1 APPLICATION(S): 100000 CREAM TOPICAL at 05:11

## 2018-07-04 RX ADMIN — QUETIAPINE FUMARATE 25 MILLIGRAM(S): 200 TABLET, FILM COATED ORAL at 21:21

## 2018-07-04 RX ADMIN — Medication 1 TABLET(S): at 05:11

## 2018-07-04 RX ADMIN — Medication 1 TABLET(S): at 17:15

## 2018-07-04 RX ADMIN — SODIUM CHLORIDE 65 MILLILITER(S): 9 INJECTION, SOLUTION INTRAVENOUS at 08:13

## 2018-07-04 RX ADMIN — Medication 3 MILLILITER(S): at 00:34

## 2018-07-04 RX ADMIN — Medication 1 APPLICATION(S): at 05:10

## 2018-07-04 RX ADMIN — NYSTATIN CREAM 1 APPLICATION(S): 100000 CREAM TOPICAL at 21:21

## 2018-07-04 NOTE — PROGRESS NOTE ADULT - ASSESSMENT
1.  Bradycardia - records show patient with history of bradycardia, Type II block and seen by cardiology in the past.  Discussed with cardiologist yesterday, no indication for PPM at this time.  Correct metabolic factors and if continued recurrence, call back.  Monitor on telemetry.    2.  Acute metabolic encephalopathy superimposed on dementia - improved.    Resume outpatient dementia medications.    3.  Acute respiratory failure - improved.  Pneumonia with sepsis.  Discuss with Dr. Lobo about home BIPAP, patient may not qualify.  Prednisone taper.  Augmentin to complete 7 days of antibiotics.    4.  Hypernatremia - improving.  Increase D5W to 65 ml/hr.  [place] thick water.    5.  History of PE.  INR therapeutic - dose warfarin per INR.

## 2018-07-04 NOTE — PROGRESS NOTE ADULT - SUBJECTIVE AND OBJECTIVE BOX
PATIENT DESCRIPTION 6/28/2018 ADMISSION [facility]   89 y/o female PMH of Dementia, nonverbal and chair bound, hx  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, HTN, CHF, s/p aortic valve replacement, COPD and GERD was admitted 6/28/2018 with elevated INR (coumadin) CO2 retn (rxed with BPAP) RLL asp pneumonia (Rxed with zosyn [6/28])    HOSPITAL COURSE 6/28/2018 ADMISSION [facility]    6/28 uc showed Pseudomonas and this is possible  coetiology of sepsis Hypernatremia was Rxed with Hypotonic fluids and improved along with creatinine improvement Pt was place don iv ufh for ho pe on 6/29 BPAP was dced 6/29 Dys 1 puree nectar diet started 6/30/2018     Episode of hypoxia and bradyarrhythmia occurred 7/3/2018 and pt was placed back on BPAP for a d Cardio felt  PPM was not indicated and DC being planned     IVF  D5 65 (7/4)     VITALS/LABS                           7/4/2018 afeb 90 120/60 15 94%   7/4/2018W 8.8 Hb 9 Plt 241  Na 146 K 3.9 CO2 32 Cr .8     REVIEW OF SYMPTOMS    Able to give ROS  NO     PHYSICAL EXAM    HEENT Unremarkable PERRLA atraumatic   RESP Fair air entry EXP prolonged    Harsh breath sound Resp distres mild   CARDIAC S1 S2 No S3     NO JVD    ABDOMEN SOFT BS PRESENT NOT DISTENDED No hepatosplenomegaly PEDAL EDEMA present No calf tenderness  NO rash     PATIENT DATA  ASSESSMENT RECOMMENDATIONS 6/28/2018 ADMISSION [facility]                         RESP   7/4/2018 3l 744/44/72   7/4/2018 Base on th the abg doubt that she will meet criteria to qualify for home bpap   7/4/2018 She can come see em in office Call  for appt and i will try arrange sleep study as outpt   7/3/2018 .6 741/45/235     COPD  Duoneb.4 (6/28)   Solumed 40 (6/29)   7/4/2018 may change to pred 30 and taper over 9 d at discharge     HO PE   7/4/2018 On coumadin    EPISODE OF BRADYCARDIA AND HYPOXIA 7/3 3A   7/4/2018 TSH .679   7/4/2018 Cardio felt that bradycardia was sec sepsis hypernatremia hypokalemia and pt had Mobitz 1 and ppm is not indicated Advised to avoid kelli blocking agents       SEPSIS UTI (6/28  10-50 Pseudomonas ) ASP PNEUMONIA POA RLL   6/28-6/29-6/30-7/1 - 7/4 W 16-10-9.4-8.3-8.8   6/27 UA 1020 Mod blood cells R 3-5 W 3-5   6/29 pct .83   7/2-7/4/2018 pct .17 - .09   6/28  10-50 Pseudomonas   6/28 bc n   7/4/2018 CXr RLL opac   Zosyn (6/28)   7/4/2018 May change to augmentin and complete 10 d abio           DYSPHAGIA   6/29 Speech eval Dys 1 Puree Nectar (6/29)     CAD  7/3-7/4 Tr 1 n.3   6/28 - 6/29 Tr 1 .069 (i) - .048 (i) - .015     HYPERNATREMIA   6/28-6/29-6/30-7/1-7/2-7/3-7/4 Na 157 - 156 - 152-149 -147-144 - 146   7/4 IV D5 w increased    GLOBAL ISSUE/BEST PRACTICE:      PROBLEM: HOB elevation:   y            PROBLEM: Stress ulcer proph:    protonix 40 (6/28)                       PROBLEM: VTE prophylaxis:      Hep inf (6/29/2018) (HO pe avr) warfarin (6/30/2018)   PROBLEM: Glycemic control:    na  PROBLEM: Nutrition:    npo (6/28) --> Dys 1 puree Nectar (6/29)   PROBLEM: Advanced directive: na     PROBLEM: Allergies:  na      TIME SPENT Over 25 minutes aggregate care time spent on encounter; activities included   direct patient care, counseling and/or coordinating care reviewing notes, lab data/ imaging , discussion with multidisciplinary team/ patient  /family

## 2018-07-05 ENCOUNTER — TRANSCRIPTION ENCOUNTER (OUTPATIENT)
Age: 83
End: 2018-07-05

## 2018-07-05 LAB
ANION GAP SERPL CALC-SCNC: 5 MMOL/L — SIGNIFICANT CHANGE UP (ref 5–17)
BUN SERPL-MCNC: 12 MG/DL — SIGNIFICANT CHANGE UP (ref 7–23)
CALCIUM SERPL-MCNC: 8.5 MG/DL — SIGNIFICANT CHANGE UP (ref 8.5–10.1)
CHLORIDE SERPL-SCNC: 106 MMOL/L — SIGNIFICANT CHANGE UP (ref 96–108)
CO2 SERPL-SCNC: 33 MMOL/L — HIGH (ref 22–31)
CREAT SERPL-MCNC: 0.8 MG/DL — SIGNIFICANT CHANGE UP (ref 0.5–1.3)
GLUCOSE SERPL-MCNC: 77 MG/DL — SIGNIFICANT CHANGE UP (ref 70–99)
HCT VFR BLD CALC: 29.3 % — LOW (ref 34.5–45)
HGB BLD-MCNC: 8.5 G/DL — LOW (ref 11.5–15.5)
INR BLD: 2.42 RATIO — HIGH (ref 0.88–1.16)
MCHC RBC-ENTMCNC: 26.5 PG — LOW (ref 27–34)
MCHC RBC-ENTMCNC: 29 GM/DL — LOW (ref 32–36)
MCV RBC AUTO: 91.3 FL — SIGNIFICANT CHANGE UP (ref 80–100)
NRBC # BLD: 0 /100 WBCS — SIGNIFICANT CHANGE UP (ref 0–0)
PLATELET # BLD AUTO: 269 K/UL — SIGNIFICANT CHANGE UP (ref 150–400)
POTASSIUM SERPL-MCNC: 4.3 MMOL/L — SIGNIFICANT CHANGE UP (ref 3.5–5.3)
POTASSIUM SERPL-SCNC: 4.3 MMOL/L — SIGNIFICANT CHANGE UP (ref 3.5–5.3)
PROTHROM AB SERPL-ACNC: 26.9 SEC — HIGH (ref 9.8–12.7)
RBC # BLD: 3.21 M/UL — LOW (ref 3.8–5.2)
RBC # FLD: 20.4 % — HIGH (ref 10.3–14.5)
SODIUM SERPL-SCNC: 144 MMOL/L — SIGNIFICANT CHANGE UP (ref 135–145)
WBC # BLD: 8.44 K/UL — SIGNIFICANT CHANGE UP (ref 3.8–10.5)
WBC # FLD AUTO: 8.44 K/UL — SIGNIFICANT CHANGE UP (ref 3.8–10.5)

## 2018-07-05 PROCEDURE — 99239 HOSP IP/OBS DSCHRG MGMT >30: CPT

## 2018-07-05 RX ORDER — PANTOPRAZOLE SODIUM 20 MG/1
1 TABLET, DELAYED RELEASE ORAL
Qty: 30 | Refills: 0 | OUTPATIENT
Start: 2018-07-05

## 2018-07-05 RX ADMIN — NYSTATIN CREAM 1 APPLICATION(S): 100000 CREAM TOPICAL at 21:06

## 2018-07-05 RX ADMIN — NYSTATIN CREAM 1 APPLICATION(S): 100000 CREAM TOPICAL at 05:28

## 2018-07-05 RX ADMIN — NYSTATIN CREAM 1 APPLICATION(S): 100000 CREAM TOPICAL at 14:36

## 2018-07-05 RX ADMIN — Medication 1 APPLICATION(S): at 17:37

## 2018-07-05 RX ADMIN — Medication 1 APPLICATION(S): at 05:28

## 2018-07-05 RX ADMIN — PANTOPRAZOLE SODIUM 40 MILLIGRAM(S): 20 TABLET, DELAYED RELEASE ORAL at 05:29

## 2018-07-05 RX ADMIN — Medication 3 MILLILITER(S): at 17:09

## 2018-07-05 RX ADMIN — Medication 1 TABLET(S): at 17:36

## 2018-07-05 RX ADMIN — Medication 1 TABLET(S): at 05:28

## 2018-07-05 RX ADMIN — Medication 3 MILLILITER(S): at 11:20

## 2018-07-05 RX ADMIN — Medication 3 MILLILITER(S): at 05:52

## 2018-07-05 RX ADMIN — Medication 30 MILLIGRAM(S): at 05:29

## 2018-07-05 RX ADMIN — QUETIAPINE FUMARATE 25 MILLIGRAM(S): 200 TABLET, FILM COATED ORAL at 21:06

## 2018-07-05 NOTE — CHART NOTE - NSCHARTNOTEFT_GEN_A_CORE
Upon Nutritional Assessment by the Registered Dietitian your patient was determined to meet criteria / has evidence of the following diagnosis/diagnoses:          [ ]  Mild Protein Calorie Malnutrition        [x ]  Moderate Protein Calorie Malnutrition        [ ] Severe Protein Calorie Malnutrition        [ ] Unspecified Protein Calorie Malnutrition        [ ] Underweight / BMI <19        [ ] Morbid Obesity / BMI > 40      Findings as based on:  •  Comprehensive nutrition assessment and consultation  •  Calorie counts (nutrient intake analysis)  •  Food acceptance and intake status from observations by staff  •  Follow up  •  Patient education  •  Intervention secondary to interdisciplinary rounds  •   concerns      Treatment:    The following diet has been recommended:  dysphagia 1 pureed, nectar thick liquids     PROVIDER Section:     By signing this assessment you are acknowledging and agree with the diagnosis/diagnoses assigned by the Registered Dietitian    Comments:

## 2018-07-05 NOTE — DISCHARGE NOTE ADULT - HOSPITAL COURSE
89 y/o female PMH of Dementia, nonverbal and chair bound, hx  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, HTN, CHF, s/p aortic valve replacement, COPD and GERD referred to ED by pmd for supra therapeutic INR. As per family, patient also with fevers, lethargy and poor oral intake last few days. No hematemesis, melena or rectal bleeding. According to family some SOB, +/- cough, no CP, no abd pain, N/V, dysuria, hematuria,  headache, neck pain, rash. Pt non verbal.  In hospital, pt received IV antibiotics for pna, was on bipap, seen by pulmonology, Dr. Lobo and cardiology Dr. Ward. Given Vitamin K for elevated INR. Had acute hypernatremia and ARF resolved with IVF. Advanced directives discussed with family, requested full code. Pt had bradycardia down to 30s, seen by cardiology, pacemaker not recommended due to rhythm. Pt for discharge to home with home care. augmentin x 1 more day. 89 y/o female PMH of Dementia, nonverbal and chair bound, hx  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, HTN, CHF, s/p aortic valve replacement, COPD and GERD referred to ED by pmd for supra therapeutic INR. As per family, patient also with fevers, lethargy and poor oral intake last few days. No hematemesis, melena or rectal bleeding. According to family some SOB, +/- cough, no CP, no abd pain, N/V, dysuria, hematuria,  headache, neck pain, rash. Pt non verbal.  In hospital, pt received IV antibiotics for pna, was on bipap, seen by pulmonology, Dr. Lobo and cardiology Dr. Ward, for bradycardia. Given Vitamin K for elevated INR. Had acute hypernatremia and ARF resolved with IVF. Advanced directives discussed with family, requested full code.  Pt had bradycardia down to 30s, seen by cardiology, pacemaker not recommended due to type of rhythm.  Patient on nasal cannula at time of discharge, sodium and creatinine normal.  INR therapeutic at 2.4 on warfarin.  Family to follow-up with Dr. Lobo (did not qualify for home BIPAP) and cardiology for possible cardiac monitor.

## 2018-07-05 NOTE — DISCHARGE NOTE ADULT - MEDICATION SUMMARY - MEDICATIONS TO STOP TAKING
I will STOP taking the medications listed below when I get home from the hospital:    clonazePAM 0.25 mg oral tablet  -- 0.5 milligram(s) by mouth once a day, As Needed MDD:1 I will STOP taking the medications listed below when I get home from the hospital:    clonazePAM 0.25 mg oral tablet  -- 0.5 milligram(s) by mouth once a day, As Needed MDD:1    hydrALAZINE 25 mg oral tablet  -- 1 tab(s) by mouth 2 times a day   -- It is very important that you take or use this exactly as directed.  Do not skip doses or discontinue unless directed by your doctor.  Some non-prescription drugs may aggravate your condition.  Read all labels carefully.  If a warning appears, check with your doctor before taking.

## 2018-07-05 NOTE — DIETITIAN INITIAL EVALUATION ADULT. - ENERGY NEEDS
Height (cm): 149.86 (07-03)  Weight (kg): 49.8 (06-28)  BMI (kg/m2): 22.2 (07-03)  Ideal Body Weight: 45 kg +/- 10%  % Ideal Body Weight: 121%

## 2018-07-05 NOTE — DIETITIAN INITIAL EVALUATION ADULT. - PERTINENT LABORATORY DATA
07-05 Na144 mmol/L Glu 77 mg/dL K+ 4.3 mmol/L Cr  0.80 mg/dL BUN 12 mg/dL Phos n/a   Alb n/a   PAB n/a

## 2018-07-05 NOTE — DISCHARGE NOTE ADULT - PLAN OF CARE
optimal respiratory functioning continue antibiotics for 1 more day  f/u with your pmd prednisone taper  cont albuterol, budesonide, spiriva cont home meds cont coumadin  f/u with your pmd for inr checks cont seroquel

## 2018-07-05 NOTE — DISCHARGE NOTE ADULT - SECONDARY DIAGNOSIS.
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

## 2018-07-05 NOTE — DISCHARGE NOTE ADULT - CARE PROVIDER_API CALL
Tin Lobo), Critical Care Medicine; Internal Medicine; Pulmonary Disease  [address] [place] [address]  Phone: [phone]  Fax: [phone]    Rosendo Lima), Cardiology; Cardiovascular Disease; Nuclear Cardiology  [address] [place] [address]  Phone: [phone]  Fax: [phone]

## 2018-07-05 NOTE — DISCHARGE NOTE ADULT - MEDICATION SUMMARY - MEDICATIONS TO CHANGE
I will SWITCH the dose or number of times a day I take the medications listed below when I get home from the hospital:  None I will SWITCH the dose or number of times a day I take the medications listed below when I get home from the hospital:    Coumadin 2 mg oral tablet  -- 1 tab(s) by mouth once a day   -- Do not take this drug if you are pregnant.  It is very important that you take or use this exactly as directed.  Do not skip doses or discontinue unless directed by your doctor.  Obtain medical advice before taking any non-prescription drugs as some may affect the action of this medication.

## 2018-07-05 NOTE — PROGRESS NOTE ADULT - SUBJECTIVE AND OBJECTIVE BOX
PATIENT DESCRIPTION 6/28/2018 AD[facility]   89 y/o female PMH of Dementia, nonverbal and chair bound, hx  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, HTN, CHF, s/p aortic valve replacement, COPD and GERD was admitted 6/28/2018 with elevated INR (coumadin) CO2 retn (rxed with BPAP) RLL asp pneumonia (Rxed with zosyn [6/28])    HOSPITAL COURSE 6/28/2018 [facility]ION [facility]    6/28 uc showed Pseudomonas and this is possible  coetiology of sepsis Patient was Rxedw ith zosyn (6/28) Hypernatremia was Rxed with Hypotonic fluids and improved along with creatinine improvement Pt was place don iv ufh for ho pe on 6/29 BPAP was dced 6/29 Dys 1 puree nectar diet started 6/30/2018     Episode of hypoxia and bradyarrhythmia occurred 7/3/2018 and pt was placed back on BPAP for a d Cardio felt  PPM was not indicated and DC being planned     DC planning on home O2 Should follow with pulm in 2 weeks     IVF  D5 65 (7/4)     VITALS/LABS                           7/5/2018 afeb 62 106/70 16 95%   7/5/2018 W 8.4 hb 8.5 Plt 269  Na 144 K 4.3 CO2 33 Cr .8     REVIEW OF SYMPTOMS    Able to give ROS  NO     PHYSICAL EXAM    HEENT Unremarkable PERRLA atraumatic   RESP Fair air entry EXP prolonged    Harsh breath sound Resp distres mild   CARDIAC S1 S2 No S3     NO JVD    ABDOMEN SOFT BS PRESENT NOT DISTENDED No hepatosplenomegaly PEDAL EDEMA present No calf tenderness  NO rash     PATIENT DATA  ASSESSMENT RECOMMENDATIONS 6/28/2018 AD[facility]                         RESP   7/4/2018 3l 744/44/72   7/4/2018 Base on th the abg doubt that she will meet criteria to qualify for home bpap   7/4/2018 She can come see me in office Call  for appt and i will try arrange sleep study as outpt   7/3/2018 .6 741/45/235   JONATHON 7/5/2018 Will need home O2 3l at DC    COPD  Duoneb.4 (6/28)   Solumed 40 (6/29)   7/4/2018 may change to pred 30 and taper over 9 d at discharge     HO PE   7/4/2018 On coumadin    EPISODE OF BRADYCARDIA AND HYPOXIA 7/3 3A   7/4/2018 TSH .679   7/4/2018 Cardio felt that bradycardia was sec sepsis hypernatremia hypokalemia and pt had Mobitz 1 and ppm is not indicated Advised to avoid kelli blocking agents       SEPSIS UTI (6/28  10-50 Pseudomonas ) ASP PNEUMONIA POA RLL   6/28-6/29-6/30-7/1 - 7/4-7/5 W 16-10-9.4-8.3-8.8 -8.4  6/27 UA 1020 Mod blood cells R 3-5 W 3-5   6/29 pct .83   7/2-7/4/2018 pct .17 - .09   6/28 uc 10-50 Pseudomonas   6/28 bc n   7/4/2018 CXr RLL opac   Zosyn (6/28)   7/4/2018 May change to augmentin and complete 10 d abio         DYSPHAGIA   6/29 Speech eval Dys 1 Puree Nectar (6/29)     CAD  7/3-7/4 Tr 1 n.3   6/28 - 6/29 Tr 1 .069 (i) - .048 (i) - .015     HYPERNATREMIA   6/28-6/29-6/30-7/1-7/2-7/3-7/4-7/5 Na 157 - 156 - 152-149 -147-144 - 146 -144  7/4 IV D5 w increased    SUSAN  6/28-6/29-6/30-7/1-7/2-7/4-7/5 Cr 3.1 -1.78-1.4 - .9- .9 - .8-.8    GLOBAL ISSUE/BEST PRACTICE:      PROBLEM: HOB elevation:   y            PROBLEM: Stress ulcer proph:    protonix 40 (6/28)                       PROBLEM: VTE prophylaxis:      Hep inf (6/29/2018) (HO pe avr) warfarin (6/30/2018)   PROBLEM: Glycemic control:    na  PROBLEM: Nutrition:    npo (6/28) --> Dys 1 puree Nectar (6/29)   PROBLEM: Advanced directive: na     PROBLEM: Allergies:  na      TIME SPENT Over 25 minutes aggregate care time spent on encounter; activities included   direct patient care, counseling and/or coordinating care reviewing notes, lab data/ imaging , discussion with multidisciplinary team/ patient  /family

## 2018-07-05 NOTE — DISCHARGE NOTE ADULT - MEDICATION SUMMARY - MEDICATIONS TO TAKE
I will START or STAY ON the medications listed below when I get home from the hospital:    budesonide 0.5 mg/2 mL inhalation suspension  -- 2 milliliter(s) inhaled 2 times a day   -- Indication: For Chronic obstructive pulmonary disease, unspecified COPD type    predniSONE 10 mg oral tablet  -- 2 tab(s) by mouth once a day x 2 days  1 tab(s) by mouth once a day x 2 days  -- Indication: For Chronic obstructive pulmonary disease, unspecified COPD type    Coumadin 2 mg oral tablet  -- 1 tab(s) by mouth once a day   -- Do not take this drug if you are pregnant.  It is very important that you take or use this exactly as directed.  Do not skip doses or discontinue unless directed by your doctor.  Obtain medical advice before taking any non-prescription drugs as some may affect the action of this medication.    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    albuterol 90 mcg/inh inhalation aerosol  -- 1 puff(s) inhaled every 4 hours PRN SOB  -- Indication: For Chronic obstructive pulmonary disease, unspecified COPD type    tiotropium 18 mcg inhalation capsule  -- 1 cap(s) inhaled once a day  -- Indication: For Chronic obstructive pulmonary disease, unspecified COPD type    docusate sodium 10 mg/mL oral liquid  -- 10 milliliter(s) by mouth 2 times a day  -- Indication: For Constipation    polyethylene glycol 3350 oral powder for reconstitution  -- 17 gram(s) by mouth once a day PRN constipation  -- Indication: For Constipation    senna oral tablet  -- 1 tab(s) by mouth once a day (at bedtime)  -- Indication: For Constipation    lactulose 10 g/15 mL oral syrup  -- 15 milliliter(s) by mouth 2 times a day PRN constipation  -- Indication: For Constipation    amoxicillin-clavulanate 500 mg-125 mg oral tablet  -- 1 tab(s) by mouth 2 times a day  -- Indication: For Pneumonia of right middle lobe due to infectious organism    pantoprazole 40 mg oral delayed release tablet  -- 1 tab(s) by mouth once a day (before a meal)  -- Indication: For gerd    hydrALAZINE 25 mg oral tablet  -- 1 tab(s) by mouth 2 times a day   -- It is very important that you take or use this exactly as directed.  Do not skip doses or discontinue unless directed by your doctor.  Some non-prescription drugs may aggravate your condition.  Read all labels carefully.  If a warning appears, check with your doctor before taking.    -- Indication: For Htn I will START or STAY ON the medications listed below when I get home from the hospital:    budesonide 0.5 mg/2 mL inhalation suspension  -- 2 milliliter(s) inhaled 2 times a day   -- Indication: For Chronic obstructive pulmonary disease, unspecified COPD type    predniSONE 10 mg oral tablet  -- 2 tab(s) by mouth once a day x 2 days  1 tab(s) by mouth once a day x 2 days  -- Indication: For Chronic obstructive pulmonary disease, unspecified COPD type    warfarin 1 mg oral tablet  -- 1 tab(s) by mouth once (at bedtime)  -- Indication: For History of pulmonary embolism    QUEtiapine 25 mg oral tablet  -- 1 tab(s) by mouth 2 times a day  -- Indication: For 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    albuterol 90 mcg/inh inhalation aerosol  -- 1 puff(s) inhaled every 4 hours PRN SOB  -- Indication: For Chronic obstructive pulmonary disease, unspecified COPD type    tiotropium 18 mcg inhalation capsule  -- 1 cap(s) inhaled once a day  -- Indication: For Chronic obstructive pulmonary disease, unspecified COPD type    docusate sodium 10 mg/mL oral liquid  -- 10 milliliter(s) by mouth 2 times a day  -- Indication: For Constipation    polyethylene glycol 3350 oral powder for reconstitution  -- 17 gram(s) by mouth once a day PRN constipation  -- Indication: For Constipation    senna oral tablet  -- 1 tab(s) by mouth once a day (at bedtime)  -- Indication: For Constipation    lactulose 10 g/15 mL oral syrup  -- 15 milliliter(s) by mouth 2 times a day PRN constipation  -- Indication: For Constipation    pantoprazole 40 mg oral delayed release tablet  -- 1 tab(s) by mouth once a day (before a meal)  -- Indication: For gerd

## 2018-07-05 NOTE — DIETITIAN INITIAL EVALUATION ADULT. - PHYSICAL APPEARANCE
well nourished/BMI 24 (7/04); Nutrition focused physical exam conducted; Subcutaneous fat loss: [mild] Orbital fat pads region, [no teeth ]Buccal fat region, [unable ]Triceps region,  [WNL ]Ribs region.  Muscle wasting: [mild ]Temples region, [mild ]Clavicle region, [mild ]Shoulder region, [WNL ]Scapula region, [unable ]Interosseous region,  [WNL ]thigh region, [mild ]Calf region

## 2018-07-05 NOTE — DISCHARGE NOTE ADULT - CARE PROVIDERS DIRECT ADDRESSES
[email or web address]

## 2018-07-05 NOTE — DIETITIAN INITIAL EVALUATION ADULT. - OTHER INFO
pt seen due to LOS. pt admit c PNA, as per H&P poor po intake x a few days PTA. lives c her daughter and has a HHA. noone at bedside at this time. past wt obtained from previous chart. swallow eval 6/29 rec altered texture for diet rx. po intake % in hosp this stay.

## 2018-07-05 NOTE — DISCHARGE NOTE ADULT - PATIENT PORTAL LINK FT
You can access the [facility], offered by [facility], by registering with the following website: [email or web address]

## 2018-07-06 LAB
ANION GAP SERPL CALC-SCNC: 6 MMOL/L — SIGNIFICANT CHANGE UP (ref 5–17)
BUN SERPL-MCNC: 12 MG/DL — SIGNIFICANT CHANGE UP (ref 7–23)
CALCIUM SERPL-MCNC: 8.9 MG/DL — SIGNIFICANT CHANGE UP (ref 8.5–10.1)
CHLORIDE SERPL-SCNC: 104 MMOL/L — SIGNIFICANT CHANGE UP (ref 96–108)
CO2 SERPL-SCNC: 35 MMOL/L — HIGH (ref 22–31)
CREAT SERPL-MCNC: 0.62 MG/DL — SIGNIFICANT CHANGE UP (ref 0.5–1.3)
GLUCOSE SERPL-MCNC: 82 MG/DL — SIGNIFICANT CHANGE UP (ref 70–99)
HCT VFR BLD CALC: 34.7 % — SIGNIFICANT CHANGE UP (ref 34.5–45)
HGB BLD-MCNC: 10.1 G/DL — LOW (ref 11.5–15.5)
INR BLD: 2.58 RATIO — HIGH (ref 0.88–1.16)
MCHC RBC-ENTMCNC: 26.6 PG — LOW (ref 27–34)
MCHC RBC-ENTMCNC: 29.1 GM/DL — LOW (ref 32–36)
MCV RBC AUTO: 91.3 FL — SIGNIFICANT CHANGE UP (ref 80–100)
NRBC # BLD: 0 /100 WBCS — SIGNIFICANT CHANGE UP (ref 0–0)
PLATELET # BLD AUTO: 340 K/UL — SIGNIFICANT CHANGE UP (ref 150–400)
POTASSIUM SERPL-MCNC: 4.4 MMOL/L — SIGNIFICANT CHANGE UP (ref 3.5–5.3)
POTASSIUM SERPL-SCNC: 4.4 MMOL/L — SIGNIFICANT CHANGE UP (ref 3.5–5.3)
PROTHROM AB SERPL-ACNC: 28.7 SEC — HIGH (ref 9.8–12.7)
RBC # BLD: 3.8 M/UL — SIGNIFICANT CHANGE UP (ref 3.8–5.2)
RBC # FLD: 20.2 % — HIGH (ref 10.3–14.5)
SODIUM SERPL-SCNC: 145 MMOL/L — SIGNIFICANT CHANGE UP (ref 135–145)
WBC # BLD: 8.04 K/UL — SIGNIFICANT CHANGE UP (ref 3.8–10.5)
WBC # FLD AUTO: 8.04 K/UL — SIGNIFICANT CHANGE UP (ref 3.8–10.5)

## 2018-07-06 PROCEDURE — 99232 SBSQ HOSP IP/OBS MODERATE 35: CPT

## 2018-07-06 RX ORDER — WARFARIN SODIUM 2.5 MG/1
1 TABLET ORAL
Qty: 0 | Refills: 0 | COMMUNITY
Start: 2018-07-06

## 2018-07-06 RX ORDER — WARFARIN SODIUM 2.5 MG/1
1 TABLET ORAL ONCE
Qty: 0 | Refills: 0 | Status: COMPLETED | OUTPATIENT
Start: 2018-07-06 | End: 2018-07-06

## 2018-07-06 RX ADMIN — Medication 3 MILLILITER(S): at 11:06

## 2018-07-06 RX ADMIN — QUETIAPINE FUMARATE 25 MILLIGRAM(S): 200 TABLET, FILM COATED ORAL at 23:44

## 2018-07-06 RX ADMIN — WARFARIN SODIUM 1 MILLIGRAM(S): 2.5 TABLET ORAL at 23:44

## 2018-07-06 RX ADMIN — NYSTATIN CREAM 1 APPLICATION(S): 100000 CREAM TOPICAL at 06:13

## 2018-07-06 RX ADMIN — Medication 3 MILLILITER(S): at 06:00

## 2018-07-06 RX ADMIN — SODIUM CHLORIDE 65 MILLILITER(S): 9 INJECTION, SOLUTION INTRAVENOUS at 13:08

## 2018-07-06 RX ADMIN — Medication 1 APPLICATION(S): at 17:10

## 2018-07-06 RX ADMIN — NYSTATIN CREAM 1 APPLICATION(S): 100000 CREAM TOPICAL at 23:43

## 2018-07-06 RX ADMIN — Medication 3 MILLILITER(S): at 00:03

## 2018-07-06 RX ADMIN — Medication 3 MILLILITER(S): at 17:03

## 2018-07-06 RX ADMIN — Medication 1 TABLET(S): at 06:12

## 2018-07-06 RX ADMIN — PANTOPRAZOLE SODIUM 40 MILLIGRAM(S): 20 TABLET, DELAYED RELEASE ORAL at 06:12

## 2018-07-06 RX ADMIN — Medication 3 MILLILITER(S): at 23:54

## 2018-07-06 RX ADMIN — Medication 30 MILLIGRAM(S): at 06:13

## 2018-07-06 RX ADMIN — Medication 1 APPLICATION(S): at 06:13

## 2018-07-06 RX ADMIN — NYSTATIN CREAM 1 APPLICATION(S): 100000 CREAM TOPICAL at 17:10

## 2018-07-06 NOTE — PROGRESS NOTE ADULT - ASSESSMENT
1.  Acute on chronic respiratory failure - improved.  In setting of pneumonia with sepsis.  Complete 7 days of antibiotics.  Taper Prednisone..    2.  Acute metabolic encephalopathy - resolved.  Baseline dementia.  Avoid medications with CNS side effects.    3.  History of PE.  Dose warfarin per INR.  Will reduce outpatient dose of warfarin to 1 mg daily.    4.  Bradycardia - improved.  Per cardiology, no indication for PPM at this time.    Discharge home once home services in place.

## 2018-07-06 NOTE — PROGRESS NOTE ADULT - SUBJECTIVE AND OBJECTIVE BOX
Patient is a 89y old  Female who presents with a chief complaint of Sent by MD for elevated INR. (05 Jul 2018 11:36)      INTERVAL HPI/OVERNIGHT EVENTS:    MEDICATIONS  (STANDING):  ALBUTerol/ipratropium for Nebulization 3 milliLiter(s) Nebulizer every 6 hours  amoxicillin  500 milliGRAM(s)/clavulanate 1 Tablet(s) Oral two times a day  BACItracin   Ointment 1 Application(s) Topical two times a day  dextrose 5%. 1000 milliLiter(s) (65 mL/Hr) IV Continuous <Continuous>  nystatin Powder 1 Application(s) Topical three times a day  pantoprazole    Tablet 40 milliGRAM(s) Oral before breakfast  predniSONE   Tablet 30 milliGRAM(s) Oral daily  QUEtiapine 25 milliGRAM(s) Oral at bedtime    MEDICATIONS  (PRN):  atropine Syringe 0.5 milliGRAM(s) IV Push once PRN symptomatic bradycrdia      Allergies    Haldol (Unknown)    Intolerances        REVIEW OF SYSTEMS:  CONSTITUTIONAL: No fever, weight loss, or fatigue  EYES: No eye pain, visual disturbances, or discharge  ENMT:  No difficulty hearing, tinnitus, vertigo; No sinus or throat pain  NECK: No pain or stiffness  BREASTS: No pain, masses, or nipple discharge  RESPIRATORY: No cough, wheezing, chills or hemoptysis; No shortness of breath  CARDIOVASCULAR: No chest pain, palpitations, dizziness, or leg swelling  GASTROINTESTINAL: No abdominal or epigastric pain. No nausea, vomiting, or hematemesis; No diarrhea or constipation. No melena or hematochezia.  GENITOURINARY: No dysuria, frequency, hematuria, or incontinence  NEUROLOGICAL: No headaches, memory loss, loss of strength, numbness, or tremors  SKIN: No itching, burning, rashes, or lesions   LYMPH NODES: No enlarged glands  ENDOCRINE: No heat or cold intolerance; No hair loss  MUSCULOSKELETAL: No joint pain or swelling; No muscle, back, or extremity pain  PSYCHIATRIC: No depression, anxiety, mood swings, or difficulty sleeping  HEME/LYMPH: No easy bruising, or bleeding gums  ALLERGY AND IMMUNOLOGIC: No hives or eczema    Vital Signs Last 24 Hrs  T(C): 37 (06 Jul 2018 05:37), Max: 37 (05 Jul 2018 17:38)  T(F): 98.6 (06 Jul 2018 05:37), Max: 98.6 (05 Jul 2018 17:38)  HR: 64 (06 Jul 2018 08:30) (64 - 88)  BP: 104/61 (06 Jul 2018 05:37) (101/59 - 120/52)  BP(mean): --  RR: 18 (06 Jul 2018 05:37) (16 - 19)  SpO2: 96% (06 Jul 2018 08:30) (96% - 100%)    PHYSICAL EXAM:  GENERAL: NAD, well-groomed, well-developed  HEAD:  Atraumatic, Normocephalic  EYES: EOMI, PERRLA, conjunctiva and sclera clear  ENMT: No tonsillar erythema, exudates, or enlargement; Moist mucous membranes, Good dentition, No lesions  NECK: Supple, No JVD, Normal thyroid  NERVOUS SYSTEM:  Alert & Oriented X3, Good concentration; Motor Strength 5/5 B/L upper and lower extremities; DTRs 2+ intact and symmetric  CHEST/LUNG: Clear to percussion bilaterally; No rales, rhonchi, wheezing, or rubs  HEART: Regular rate and rhythm; No murmurs, rubs, or gallops  ABDOMEN: Soft, Nontender, Nondistended; Bowel sounds present  EXTREMITIES:  2+ Peripheral Pulses, No clubbing, cyanosis, or edema  LYMPH: No lymphadenopathy noted  SKIN: No rashes or lesions    LABS:                        10.1   8.04  )-----------( 340      ( 06 Jul 2018 07:57 )             34.7     07-05    144  |  106  |  12  ----------------------------<  77  4.3   |  33<H>  |  0.80    Ca    8.5      05 Jul 2018 07:22      PT/INR - ( 05 Jul 2018 07:22 )   PT: 26.9 sec;   INR: 2.42 ratio             CAPILLARY BLOOD GLUCOSE          RADIOLOGY & ADDITIONAL TESTS:    Imaging Personally Reviewed:  [ ] YES  [ ] NO    Consultant(s) Notes Reviewed:  [ ] YES  [ ] NO    Care Discussed with Consultants/Other Providers [ ] YES  [ ] NO Patient is a 89y old  Female who presents with a chief complaint of Sent by MD for elevated INR. (05 Jul 2018 11:36)      INTERVAL HPI/OVERNIGHT EVENTS:  Patient medically discharged on 7/5, awaiting home services.  Hospitalist follow-up for:    Acute on chronic respiratory failure - stable, doing well on nasal cannula.    Bradycardia - much less frequent episodes of bradycardia, usually noted during sleep hours.    Dementia - stable, at baseline, doesn't communicate or follow-up instructions.    MEDICATIONS  (STANDING):  ALBUTerol/ipratropium for Nebulization 3 milliLiter(s) Nebulizer every 6 hours  amoxicillin  500 milliGRAM(s)/clavulanate 1 Tablet(s) Oral two times a day  BACItracin   Ointment 1 Application(s) Topical two times a day  dextrose 5%. 1000 milliLiter(s) (65 mL/Hr) IV Continuous <Continuous>  nystatin Powder 1 Application(s) Topical three times a day  pantoprazole    Tablet 40 milliGRAM(s) Oral before breakfast  predniSONE   Tablet 30 milliGRAM(s) Oral daily  QUEtiapine 25 milliGRAM(s) Oral at bedtime    MEDICATIONS  (PRN):  atropine Syringe 0.5 milliGRAM(s) IV Push once PRN symptomatic bradycrdia      Allergies    Haldol (Unknown)    Intolerances        REVIEW OF SYSTEMS:  Unable to obtain due to baseline dementia.    Vital Signs Last 24 Hrs  T(C): 37 (06 Jul 2018 05:37), Max: 37 (05 Jul 2018 17:38)  T(F): 98.6 (06 Jul 2018 05:37), Max: 98.6 (05 Jul 2018 17:38)  HR: 64 (06 Jul 2018 08:30) (64 - 88)  BP: 104/61 (06 Jul 2018 05:37) (101/59 - 120/52)  BP(mean): --  RR: 18 (06 Jul 2018 05:37) (16 - 19)  SpO2: 96% (06 Jul 2018 08:30) (96% - 100%)    PHYSICAL EXAM:  GENERAL: NAD, awake.  NERVOUS SYSTEM:   Unable to assess, not cooperative.  CHEST/LUNG: Clear anteriorly, diminished at bases.  No wheezes or rhonchi noted.  HEART: Regular rate and rhythm; No murmurs, rubs, or gallops  ABDOMEN: Soft, Nontender, Nondistended; Bowel sounds present  EXTREMITIES:   No clubbing, cyanosis, or edema  SKIN:  Decreased turgor.    LABS:                        10.1   8.04  )-----------( 340      ( 06 Jul 2018 07:57 )             34.7     07-05    144  |  106  |  12  ----------------------------<  77  4.3   |  33<H>  |  0.80    Ca    8.5      05 Jul 2018 07:22      PT/INR - ( 05 Jul 2018 07:22 )   PT: 26.9 sec;   INR: 2.42 ratio

## 2018-07-06 NOTE — PROGRESS NOTE ADULT - SUBJECTIVE AND OBJECTIVE BOX
doing well awaits placem[facility] Patient seen /examined today Plan discussed/Questions answered  (patient/ancillary staff/colleagues) Chart notes reviewd More detailed Pulm/Critical Care notes, assessment and plan  will be added later today as needed after reviewing further labs as they become available     Notable interval events reviewed    Detailed note to follow doing well awaits placem[facility] Patient seen /examined today Plan discussed/Questions answered  (patient/ancillary staff/colleagues) Chart notes reviewd More detailed Pulm/Critical Care notes, assessment and plan  will be added later today as needed after reviewing further labs as they become available     Notable interval events reviewed  PATIENT DESCRIPTION 6/28/2018 ADMISSION [facility]   89 y/o female PMH of Dementia, nonverbal and chair bound, hx  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, HTN, CHF, s/p aortic valve replacement, COPD and GERD was admitted 6/28/2018 with elevated INR (coumadin) CO2 retn (rxed with BPAP) RLL asp pneumonia (Rxed with zosyn [6/28])    HOSPITAL COURSE 6/28/2018 ADMISSION [facility]    6/28 uc showed Pseudomonas and this is possible  coetiology of sepsis Patient was Rxedw ith zosyn (6/28) Hypernatremia was Rxed with Hypotonic fluids and improved along with creatinine improvement Pt was place don iv ufh for ho pe on 6/29 BPAP was dced 6/29 Dys 1 puree nectar diet started 6/30/2018     Episode of hypoxia and bradyarrhythmia occurred 7/3/2018 and pt was placed back on BPAP for a d Cardio felt  PPM was not indicated and DC being planned     DC planning on home O2 Should follow with pulm in 2 weeks     VITALS/LABS                           7/6/2018 afeb 62 120/60   7/6/2018 W 8 Hb 10.1 Plt 340 INR 258nA 145 k 4.4 CO2 25 Cr .6     REVIEW OF SYMPTOMS    Able to give ROS  NO     PHYSICAL EXAM    HEENT Unremarkable PERRLA atraumatic   RESP Fair air entry EXP prolonged    Harsh breath sound Resp distres mild   CARDIAC S1 S2 No S3     NO JVD    ABDOMEN SOFT BS PRESENT NOT DISTENDED No hepatosplenomegaly PEDAL EDEMA present No calf tenderness  NO rash     PATIENT DATA  ASSESSMENT RECOMMENDATIONS 6/28/2018 ADMISSION [facility]                         RESP   7/4/2018 3l 744/44/72   JONATHON Will need home O2 at DC       COPD  Duoneb.4 (6/28)   Solumed 40 (6/29)   7/4/2018 may change to pred 30 and taper over 9 d at discharge     HO PE   7/4/2018 On coumadin    EPISODE OF BRADYCARDIA AND HYPOXIA 7/3 3A   7/4/2018 TSH .679   7/4/2018 Cardio felt that bradycardia was sec sepsis hypernatremia hypokalemia and pt had Mobitz 1 and ppm is not indicated Advised to avoid kelli blocking agents   7/3/2018 managed with atropine  7/3/2018 Check TSH Check C enz Check ECHO       SEPSIS UTI (6/28  10-50 Pseudomonas ) ASP PNEUMONIA POA RLL   6/28-6/29-6/30-7/1 - 7/4-7/5 W 16-10-9.4-8.3-8.8 -8.4  6/27 UA 1020 Mod blood cells R 3-5 W 3-5   6/29 pct .83   7/2-7/4/2018 pct .17 - .09   6/28  10-50 Pseudomonas   6/28 bc n   7/4/2018 CXr RLL opac         DYSPHAGIA   6/29 Speech eval Dys 1 Puree [place] (6/29)     HYPERNATREMIA   6/28-6/29-6/30-7/1-7/2-7/3-7/4-7/5 Na 157 - 156 - 152-149 -147-144 - 146 -144  7/4 IV D5 w increased    SUSAN  6/28-6/29-6/30-7/1-7/2-7/4-7/5 Cr 3.1 -1.78-1.4 - .9- .9 - .8-.8    GLOBAL ISSUE/BEST PRACTICE:      PROBLEM: HOB elevation:   y            PROBLEM: Stress ulcer proph:    protonix 40 (6/28)                       PROBLEM: VTE prophylaxis:      Hep inf (6/29/2018) (HO pe avr) warfarin (6/30/2018)   PROBLEM: Glycemic control:    na  PROBLEM: Nutrition:    npo (6/28) --> Dys 1 puree Nectar (6/29)   PROBLEM: Advanced directive: na     PROBLEM: Allergies:  na      TIME SPENT Over 25 minutes aggregate care time spent on encounter; activities included   direct patient care, counseling and/or coordinating care reviewing notes, lab data/ imaging , discussion with multidisciplinary team/ patient  /family

## 2018-07-07 VITALS — OXYGEN SATURATION: 96 %

## 2018-07-07 LAB
ANION GAP SERPL CALC-SCNC: 7 MMOL/L — SIGNIFICANT CHANGE UP (ref 5–17)
BUN SERPL-MCNC: 12 MG/DL — SIGNIFICANT CHANGE UP (ref 7–23)
CALCIUM SERPL-MCNC: 8.7 MG/DL — SIGNIFICANT CHANGE UP (ref 8.5–10.1)
CHLORIDE SERPL-SCNC: 104 MMOL/L — SIGNIFICANT CHANGE UP (ref 96–108)
CO2 SERPL-SCNC: 34 MMOL/L — HIGH (ref 22–31)
CREAT SERPL-MCNC: 0.63 MG/DL — SIGNIFICANT CHANGE UP (ref 0.5–1.3)
GLUCOSE SERPL-MCNC: 81 MG/DL — SIGNIFICANT CHANGE UP (ref 70–99)
HCT VFR BLD CALC: 30.8 % — LOW (ref 34.5–45)
HGB BLD-MCNC: 9.1 G/DL — LOW (ref 11.5–15.5)
INR BLD: 2.54 RATIO — HIGH (ref 0.88–1.16)
MCHC RBC-ENTMCNC: 26.8 PG — LOW (ref 27–34)
MCHC RBC-ENTMCNC: 29.5 GM/DL — LOW (ref 32–36)
MCV RBC AUTO: 90.6 FL — SIGNIFICANT CHANGE UP (ref 80–100)
NRBC # BLD: 0 /100 WBCS — SIGNIFICANT CHANGE UP (ref 0–0)
PLATELET # BLD AUTO: 321 K/UL — SIGNIFICANT CHANGE UP (ref 150–400)
POTASSIUM SERPL-MCNC: 4.2 MMOL/L — SIGNIFICANT CHANGE UP (ref 3.5–5.3)
POTASSIUM SERPL-SCNC: 4.2 MMOL/L — SIGNIFICANT CHANGE UP (ref 3.5–5.3)
PROTHROM AB SERPL-ACNC: 28.2 SEC — HIGH (ref 9.8–12.7)
RBC # BLD: 3.4 M/UL — LOW (ref 3.8–5.2)
RBC # FLD: 20 % — HIGH (ref 10.3–14.5)
SODIUM SERPL-SCNC: 145 MMOL/L — SIGNIFICANT CHANGE UP (ref 135–145)
WBC # BLD: 7.28 K/UL — SIGNIFICANT CHANGE UP (ref 3.8–10.5)
WBC # FLD AUTO: 7.28 K/UL — SIGNIFICANT CHANGE UP (ref 3.8–10.5)

## 2018-07-07 PROCEDURE — 99232 SBSQ HOSP IP/OBS MODERATE 35: CPT

## 2018-07-07 RX ORDER — WARFARIN SODIUM 2.5 MG/1
1 TABLET ORAL ONCE
Qty: 0 | Refills: 0 | Status: DISCONTINUED | OUTPATIENT
Start: 2018-07-07 | End: 2018-07-07

## 2018-07-07 RX ADMIN — SODIUM CHLORIDE 65 MILLILITER(S): 9 INJECTION, SOLUTION INTRAVENOUS at 07:13

## 2018-07-07 RX ADMIN — Medication 1 APPLICATION(S): at 06:44

## 2018-07-07 RX ADMIN — Medication 20 MILLIGRAM(S): at 05:26

## 2018-07-07 RX ADMIN — SODIUM CHLORIDE 65 MILLILITER(S): 9 INJECTION, SOLUTION INTRAVENOUS at 05:27

## 2018-07-07 RX ADMIN — NYSTATIN CREAM 1 APPLICATION(S): 100000 CREAM TOPICAL at 05:26

## 2018-07-07 RX ADMIN — Medication 3 MILLILITER(S): at 11:00

## 2018-07-07 RX ADMIN — PANTOPRAZOLE SODIUM 40 MILLIGRAM(S): 20 TABLET, DELAYED RELEASE ORAL at 06:45

## 2018-07-07 RX ADMIN — Medication 3 MILLILITER(S): at 05:09

## 2018-07-07 NOTE — PROGRESS NOTE ADULT - SUBJECTIVE AND OBJECTIVE BOX
PATIENT DESCRIPTION 6/28/2018 ADMISSION [facility]   89 y/o female PMH of Dementia, nonverbal and chair bound, hx  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, HTN, CHF, s/p aortic valve replacement, COPD and GERD was admitted 6/28/2018 with elevated INR (coumadin) CO2 retn (rxed with BPAP) RLL asp pneumonia (Rxed with zosyn [6/28])    HOSPITAL COURSE 6/28/2018 ADMISSION [facility]    6/28 uc showed Pseudomonas and this is possible  coetiology of sepsis Patient was Rxedw ith zosyn (6/28) Hypernatremia was Rxed with Hypotonic fluids and improved along with creatinine improvement Pt was place don iv ufh for ho pe on 6/29 BPAP was dced 6/29 Dys 1 puree nectar diet started 6/30/2018     Episode of hypoxia and bradyarrhythmia occurred 7/3/2018 and pt was placed back on BPAP for a d Cardio felt  PPM was not indicated and DC being planned     DC planning on home O2 Should follow with pulm in 2 weeks Call Dr Lobo     IVF  D5 65 (7/4)     VITALS/LABS                           7/7/2018 afeb 61 140/80 18 99% 53   7/7/2018 W 7.2 Hb 9.1 Plt 321  Na 145 K 4.2 CO2 34 Cr .6     REVIEW OF SYMPTOMS    Able to give ROS  NO     PHYSICAL EXAM    HEENT Unremarkable PERRLA atraumatic   RESP Fair air entry EXP prolonged    Harsh breath sound Resp distres mild   CARDIAC S1 S2 No S3     NO JVD    ABDOMEN SOFT BS PRESENT NOT DISTENDED No hepatosplenomegaly PEDAL EDEMA present No calf tenderness  NO rash     PATIENT DATA  ASSESSMENT RECOMMENDATIONS 6/28/2018 ADMISSION [facility]                         RESP   7/4/2018 3l 744/44/72   JONATHON Will need home O2 at DC     COPD  Duoneb.4 (6/28)   Pred 20 (7/6)   Improved     HO PE   7/4/2018 On coumadin    COUMADIN DOSE                             7/7/2018 INR 2.54 7/7/2018 Warfarin 1 mg       SEPSIS UTI (6/28 uc 10-50 Pseudomonas ) ASP PNEUMONIA POA RLL   6/28-6/29-6/30-7/1 - 7/4-7/5 W 16-10-9.4-8.3-8.8 -8.4  6/27 UA 1020 Mod blood cells R 3-5 W 3-5   6/29 pct .83   7/2-7/4/2018 pct .17 - .09   6/28 uc 10-50 Pseudomonas   6/28 bc n   7/4/2018 CXr RLL opac   Augmentin 7/3-7/6)  Zosyn (6/28-7/3)   ASSESS JONATHON Abio course completed Will need FU CXR in 6 w           DYSPHAGIA   6/29 Speech eval Dys 1 Puree Nectar (6/29)     CAD  7/3-7/4 Tr 1 n.3   6/28 - 6/29 Tr 1 .069 (i) - .048 (i) - .015   ASSESS No ongoing ischemia     HYPERNATREMIA   6/28-6/29-6/30-7/1-7/2-7/3-7/4-7/5 Na 157 - 156 - 152-149 -147-144 - 146 -144  7/4 IV D5 w increased  ASSESS Improved     SUSAN  6/28-6/29-6/30-7/1-7/2-7/4-7/5 Cr 3.1 -1.78-1.4 - .9- .9 - .8-.8  ASSESSMENT Resolved    GLOBAL ISSUE/BEST PRACTICE:      PROBLEM: HOB elevation:   y            PROBLEM: Stress ulcer proph:    protonix 40 (6/28)                       PROBLEM: VTE prophylaxis:      Hep inf (6/29/2018) (HO pe avr) warfarin (6/30/2018)   PROBLEM: Glycemic control:    na  PROBLEM: Nutrition:    npo (6/28) --> Dys 1 puree Nectar (6/29)   PROBLEM: Advanced directive: na     PROBLEM: Allergies:  na      TIME SPENT Over 25 minutes aggregate care time spent on encounter; activities included   direct patient care, counseling and/or coordinating care reviewing notes, lab data/ imaging , discussion with multidisciplinary team/ patient  /family

## 2018-07-07 NOTE — PROGRESS NOTE ADULT - SUBJECTIVE AND OBJECTIVE BOX
Patient is a 89y old  Female who presents with a chief complaint of Sent by MD for elevated INR. (05 Jul 2018 11:36)      INTERVAL HPI/OVERNIGHT EVENTS:    MEDICATIONS  (STANDING):  ALBUTerol/ipratropium for Nebulization 3 milliLiter(s) Nebulizer every 6 hours  BACItracin   Ointment 1 Application(s) Topical two times a day  dextrose 5%. 1000 milliLiter(s) (65 mL/Hr) IV Continuous <Continuous>  nystatin Powder 1 Application(s) Topical three times a day  pantoprazole    Tablet 40 milliGRAM(s) Oral before breakfast  predniSONE   Tablet 20 milliGRAM(s) Oral daily  QUEtiapine 25 milliGRAM(s) Oral at bedtime    MEDICATIONS  (PRN):  atropine Syringe 0.5 milliGRAM(s) IV Push once PRN symptomatic bradycrdia      Allergies    Haldol (Unknown)    Intolerances        REVIEW OF SYSTEMS:  CONSTITUTIONAL: No fever, weight loss, or fatigue  EYES: No eye pain, visual disturbances, or discharge  ENMT:  No difficulty hearing, tinnitus, vertigo; No sinus or throat pain  NECK: No pain or stiffness  BREASTS: No pain, masses, or nipple discharge  RESPIRATORY: No cough, wheezing, chills or hemoptysis; No shortness of breath  CARDIOVASCULAR: No chest pain, palpitations, dizziness, or leg swelling  GASTROINTESTINAL: No abdominal or epigastric pain. No nausea, vomiting, or hematemesis; No diarrhea or constipation. No melena or hematochezia.  GENITOURINARY: No dysuria, frequency, hematuria, or incontinence  NEUROLOGICAL: No headaches, memory loss, loss of strength, numbness, or tremors  SKIN: No itching, burning, rashes, or lesions   LYMPH NODES: No enlarged glands  ENDOCRINE: No heat or cold intolerance; No hair loss  MUSCULOSKELETAL: No joint pain or swelling; No muscle, back, or extremity pain  PSYCHIATRIC: No depression, anxiety, mood swings, or difficulty sleeping  HEME/LYMPH: No easy bruising, or bleeding gums  ALLERGY AND IMMUNOLOGIC: No hives or eczema    Vital Signs Last 24 Hrs  T(C): 36.4 (07 Jul 2018 05:35), Max: 36.8 (06 Jul 2018 17:30)  T(F): 97.6 (07 Jul 2018 05:35), Max: 98.2 (06 Jul 2018 17:30)  HR: 70 (07 Jul 2018 08:02) (61 - 92)  BP: 142/82 (07 Jul 2018 05:35) (104/83 - 146/85)  BP(mean): --  RR: 18 (07 Jul 2018 05:35) (18 - 19)  SpO2: 97% (07 Jul 2018 08:02) (96% - 99%)    PHYSICAL EXAM:  GENERAL: NAD, well-groomed, well-developed  HEAD:  Atraumatic, Normocephalic  EYES: EOMI, PERRLA, conjunctiva and sclera clear  ENMT: No tonsillar erythema, exudates, or enlargement; Moist mucous membranes, Good dentition, No lesions  NECK: Supple, No JVD, Normal thyroid  NERVOUS SYSTEM:  Alert & Oriented X3, Good concentration; Motor Strength 5/5 B/L upper and lower extremities; DTRs 2+ intact and symmetric  CHEST/LUNG: Clear to percussion bilaterally; No rales, rhonchi, wheezing, or rubs  HEART: Regular rate and rhythm; No murmurs, rubs, or gallops  ABDOMEN: Soft, Nontender, Nondistended; Bowel sounds present  EXTREMITIES:  2+ Peripheral Pulses, No clubbing, cyanosis, or edema  LYMPH: No lymphadenopathy noted  SKIN: No rashes or lesions    LABS:                        9.1    7.28  )-----------( 321      ( 07 Jul 2018 07:12 )             30.8     07-07    145  |  104  |  12  ----------------------------<  81  4.2   |  34<H>  |  0.63    Ca    8.7      07 Jul 2018 07:12      PT/INR - ( 07 Jul 2018 07:12 )   PT: 28.2 sec;   INR: 2.54 ratio             CAPILLARY BLOOD GLUCOSE          RADIOLOGY & ADDITIONAL TESTS:    Imaging Personally Reviewed:  [ ] YES  [ ] NO    Consultant(s) Notes Reviewed:  [ ] YES  [ ] NO    Care Discussed with Consultants/Other Providers [ ] YES  [ ] NO Patient is a 89y old  Female who presents with a chief complaint of Sent by MD for elevated INR. (05 Jul 2018 11:36)      INTERVAL HPI/OVERNIGHT EVENTS:  Patient has been medically discharged on 7/5, awaiting re-initiation of home services.    No new events overnight.  Remain stable on nasal cannula, not requiring BIPAP at night.  Telemetry still with intermittent bradycardia, Mobitz I.  Eating well with assistance.    MEDICATIONS  (STANDING):  ALBUTerol/ipratropium for Nebulization 3 milliLiter(s) Nebulizer every 6 hours  BACItracin   Ointment 1 Application(s) Topical two times a day  dextrose 5%. 1000 milliLiter(s) (65 mL/Hr) IV Continuous <Continuous>  nystatin Powder 1 Application(s) Topical three times a day  pantoprazole    Tablet 40 milliGRAM(s) Oral before breakfast  predniSONE   Tablet 20 milliGRAM(s) Oral daily  QUEtiapine 25 milliGRAM(s) Oral at bedtime    MEDICATIONS  (PRN):  atropine Syringe 0.5 milliGRAM(s) IV Push once PRN symptomatic bradycrdia      Allergies    Haldol (Unknown)    Intolerances        REVIEW OF SYSTEMS:  Unable to obtain, baseline dementia, not communicating.    Vital Signs Last 24 Hrs  T(C): 36.4 (07 Jul 2018 05:35), Max: 36.8 (06 Jul 2018 17:30)  T(F): 97.6 (07 Jul 2018 05:35), Max: 98.2 (06 Jul 2018 17:30)  HR: 70 (07 Jul 2018 08:02) (61 - 92)  BP: 142/82 (07 Jul 2018 05:35) (104/83 - 146/85)  BP(mean): --  RR: 18 (07 Jul 2018 05:35) (18 - 19)  SpO2: 97% (07 Jul 2018 08:02) (96% - 99%)    PHYSICAL EXAM:  GENERAL: NAD, awake.  NERVOUS SYSTEM:   Unable to assess, not cooperative.  CHEST/LUNG: Clear anteriorly, diminished at bases.  No wheezes or rhonchi noted.  HEART: Regular rate and rhythm; No murmurs, rubs, or gallops  ABDOMEN: Soft, Nontender, Nondistended; Bowel sounds present  EXTREMITIES:   No clubbing, cyanosis, or edema  SKIN:  Decreased turgor.      LABS:                        9.1    7.28  )-----------( 321      ( 07 Jul 2018 07:12 )             30.8     07-07    145  |  104  |  12  ----------------------------<  81  4.2   |  34<H>  |  0.63    Ca    8.7      07 Jul 2018 07:12      PT/INR - ( 07 Jul 2018 07:12 )   PT: 28.2 sec;   INR: 2.54 ratio

## 2018-07-07 NOTE — PROGRESS NOTE ADULT - PROVIDER SPECIALTY LIST ADULT
Hospitalist
Pulmonology
Hospitalist

## 2018-07-07 NOTE — PROGRESS NOTE ADULT - ASSESSMENT
1.  Acute on chronic respiratory failure - improved.  In setting of pneumonia with sepsis.  Complete 7 days of antibiotics.  Taper Prednisone.    2.  Acute metabolic encephalopathy - resolved.  Baseline dementia.  Avoid medications with CNS side effects.    3.  History of PE.  Dose warfarin per INR.  Outpatient dose of warfarin reduced to 1 mg daily.    4.  Bradycardia - improved.  Per cardiology, no indication for PPM at this time.    Discharge home once home services in place. 1.  Acute on chronic respiratory failure - improved.  In setting of pneumonia with sepsis.  Complete 7 days of antibiotics.  Taper Prednisone.    2.  Acute metabolic encephalopathy - resolved.  Baseline dementia.  Avoid medications with CNS side effects.    3.  History of PE.  Dose warfarin per INR.  Outpatient dose of warfarin reduced to 1 mg daily.    4.  Bradycardia - improved.  Per cardiology, no indication for PPM at this time.    5.  Dysphagia.  I called and spoke with patient's emergency contact/grand-daughter, they have been using same diet (pureed with nectar thick fluid) at home prior to admission and will continue when patient return home.    Discharge home once home services in place.

## 2018-07-10 DIAGNOSIS — N17.9 ACUTE KIDNEY FAILURE, UNSPECIFIED: ICD-10-CM

## 2018-07-10 DIAGNOSIS — F02.81 DEMENTIA IN OTHER DISEASES CLASSIFIED ELSEWHERE, UNSPECIFIED SEVERITY, WITH BEHAVIORAL DISTURBANCE: ICD-10-CM

## 2018-07-10 DIAGNOSIS — E44.0 MODERATE PROTEIN-CALORIE MALNUTRITION: ICD-10-CM

## 2018-07-10 DIAGNOSIS — G93.41 METABOLIC ENCEPHALOPATHY: ICD-10-CM

## 2018-07-10 DIAGNOSIS — B96.5 PSEUDOMONAS (AERUGINOSA) (MALLEI) (PSEUDOMALLEI) AS THE CAUSE OF DISEASES CLASSIFIED ELSEWHERE: ICD-10-CM

## 2018-07-10 DIAGNOSIS — J96.02 ACUTE RESPIRATORY FAILURE WITH HYPERCAPNIA: ICD-10-CM

## 2018-07-10 DIAGNOSIS — Z85.43 PERSONAL HISTORY OF MALIGNANT NEOPLASM OF OVARY: ICD-10-CM

## 2018-07-10 DIAGNOSIS — K21.9 GASTRO-ESOPHAGEAL REFLUX DISEASE WITHOUT ESOPHAGITIS: ICD-10-CM

## 2018-07-10 DIAGNOSIS — Z88.8 ALLERGY STATUS TO OTHER DRUGS, MEDICAMENTS AND BIOLOGICAL SUBSTANCES STATUS: ICD-10-CM

## 2018-07-10 DIAGNOSIS — Z95.2 PRESENCE OF PROSTHETIC HEART VALVE: ICD-10-CM

## 2018-07-10 DIAGNOSIS — E87.6 HYPOKALEMIA: ICD-10-CM

## 2018-07-10 DIAGNOSIS — J44.9 CHRONIC OBSTRUCTIVE PULMONARY DISEASE, UNSPECIFIED: ICD-10-CM

## 2018-07-10 DIAGNOSIS — Z79.899 OTHER LONG TERM (CURRENT) DRUG THERAPY: ICD-10-CM

## 2018-07-10 DIAGNOSIS — D68.4 ACQUIRED COAGULATION FACTOR DEFICIENCY: ICD-10-CM

## 2018-07-10 DIAGNOSIS — Z86.711 PERSONAL HISTORY OF PULMONARY EMBOLISM: ICD-10-CM

## 2018-07-10 DIAGNOSIS — E86.0 DEHYDRATION: ICD-10-CM

## 2018-07-10 DIAGNOSIS — A41.9 SEPSIS, UNSPECIFIED ORGANISM: ICD-10-CM

## 2018-07-10 DIAGNOSIS — I50.9 HEART FAILURE, UNSPECIFIED: ICD-10-CM

## 2018-07-10 DIAGNOSIS — I95.9 HYPOTENSION, UNSPECIFIED: ICD-10-CM

## 2018-07-10 DIAGNOSIS — R00.1 BRADYCARDIA, UNSPECIFIED: ICD-10-CM

## 2018-07-10 DIAGNOSIS — R13.10 DYSPHAGIA, UNSPECIFIED: ICD-10-CM

## 2018-07-10 DIAGNOSIS — Z79.01 LONG TERM (CURRENT) USE OF ANTICOAGULANTS: ICD-10-CM

## 2018-07-10 DIAGNOSIS — G30.9 ALZHEIMER'S DISEASE, UNSPECIFIED: ICD-10-CM

## 2018-07-10 DIAGNOSIS — J18.1 LOBAR PNEUMONIA, UNSPECIFIED ORGANISM: ICD-10-CM

## 2018-07-10 DIAGNOSIS — I11.0 HYPERTENSIVE HEART DISEASE WITH HEART FAILURE: ICD-10-CM

## 2018-07-10 DIAGNOSIS — J69.0 PNEUMONITIS DUE TO INHALATION OF FOOD AND VOMIT: ICD-10-CM

## 2018-07-10 DIAGNOSIS — E87.0 HYPEROSMOLALITY AND HYPERNATREMIA: ICD-10-CM

## 2018-12-24 NOTE — DISCHARGE NOTE ADULT - BECAUSE OF A PHYSICAL, MENTAL OR EMOTIONAL CONDITION, DO YOU HAVE DIFFICULTY DOING  ERRANDS ALONE LIKE VISITING A DOCTOR'S OFFICE OR SHOPPING (15 YEARS AND OLDER)
Pt with cough, SOB, myalgias, fevers/chills, RVP +FluA  CXR (-) for PNA   Continue Tamiflu   Symptom management - tylenol for fevers, pain; antitussives  Hyponatremia - suspect risk of SIADH given symptoms, d/c IV fluids. Check repeat BMP in AM. Yes

## 2019-12-25 NOTE — DIETITIAN INITIAL EVALUATION ADULT. - PROBLEM/PLAN-2
Patient Education     Dental Abscess (Child)  An abscess is an area of fluid (pus) that happens where there is an infection. A dental abscess is caused by bacteria inside a tooth. Bacteria can get inside a tooth if the tooth has a crack or cavity. Cavities are caused by problems in oral hygiene and poor diet. Cracks are most often caused by dental trauma.  Symptoms of a dental abscess include pain that is sharp or throbbing. The tooth is sensitive to hot, cold, or pressure. The gums can be red and swollen. Your child may also have a swollen neck or jaw and a fever. Some children have a bitter taste in the mouth or bad breath.  Antibiotics are given to treat the infection. In some cases, your child may need a root canal to save the tooth. In rare cases, the child may need surgery to drain the abscess.  Home care  Your child’s healthcare provider may prescribe medicines for infection, pain, and fever. He or she may also prescribe fluoride tablets to help prevent tooth decay. Follow all instructions for giving these medicines to your child. If your child is given an antibiotic, make sure to give all the medicine for the full number of days until it is gone. Keep giving the medicine even if your child has no symptoms.  General care  · Apply a cold pack or ice compress for up to 20 minutes several times a day. This is to help reduce pain and relieve swelling. Cover it with a thin, dry cloth before putting it on your child’s skin.  · Have your child rinse his or her mouth with warm saltwater. This will help reduce irritation, gum swelling, and pain. Make sure your child does not swallow the rinse.  · Help your child have good oral hygiene. Brush your child’s teeth or have your child brush his or her teeth at least twice a day. Use a fluoride toothpaste and soft-bristle toothbrush. Help your child with areas that are hard to reach, such as back molars.  · Offer your child a variety of healthy foods to eat. Have your child  eat a healthy diet that doesn’t include many sugary foods and drinks.  Special notes to parents  · Babies ages 6 to 11 months. Teeth begin to come in around 6 months of age. Brush your child’s teeth to prevent cavities. Make sure your child has dental checkups as soon as teeth come in. Ask the dentist how often your child should be seen.  · Children ages 12 months to 3 years. By the time a child is 3 years old, he or she will have a full set of baby teeth. It’s important to brush baby teeth to prevent cavities. Decay in baby teeth can affect permanent teeth.  · Children ages 6 and up. Around the age of 6 to 7 years, permanent teeth start coming in. It’s important to brush permanent teeth to prevent cavities. Make sure your child has regular dental checkups. Ask the dentist how often your child should be seen.  Follow-up care  Follow up with your child’s healthcare provider, or as advised.  When to seek medical advice  Call your child's healthcare provider right away if any of these occur:  · Fever as directed by your healthcare provider, or:  ? Your child is younger than 12 weeks and has a fever of 100.4°F (38°C) or higher. Your baby may need to seen by his or her healthcare provider.  ? Your child has repeated fevers above 104°F (40°C) at any age.  ? Your child is younger than 2 years old and has a fever that continues for more than 24 hours, or your child is 2 years old and older and has a fever continues for more than 3 days.  · Pain and swelling in your child's neck or face  · Nausea or vomiting  · Redness or swelling that doesn’t go away  · Pain that gets worse  · Foul-smelling fluid coming from the tooth  Date Last Reviewed: 10/1/2016  © 3758-4663 [employer]. [address] [place] [address], [place] [address]. All rights reserved. This information is not intended as a substitute for professional medical care. Always follow your healthcare professional's instructions.            DISPLAY PLAN FREE TEXT

## 2020-07-14 NOTE — H&P ADULT - PROBLEM SELECTOR PLAN 8
Addended by: SAVITA HATCH on: 7/14/2020 04:14 PM     Modules accepted: Orders     hypotonic fluids, follow BMP

## 2022-11-30 NOTE — GOALS OF CARE CONVERSATION - PERSONAL ADVANCE DIRECTIVE - FAMILY/CHILD(REN)
Dominic (grand daughter) [phone] Stage 2: Additional Anesthesia Type: 1% lidocaine with epinephrine

## 2023-04-14 NOTE — GOALS OF CARE CONVERSATION - PERSONAL ADVANCE DIRECTIVE - AGENT'S NAME
Tania Ye Detail Level: Detailed Depth Of Biopsy: dermis Was A Bandage Applied: Yes Size Of Lesion In Cm: 0.6 X Size Of Lesion In Cm: 0 Biopsy Type: H and E Biopsy Method: Dermablade Anesthesia Type: 1% lidocaine with epinephrine Anesthesia Volume In Cc (Will Not Render If 0): 1 Hemostasis: Tre's Wound Care: Petrolatum Dressing: bandage Destruction After The Procedure: No Type Of Destruction Used: Curettage Curettage Text: The wound bed was treated with curettage after the biopsy was performed. Cryotherapy Text: The wound bed was treated with cryotherapy after the biopsy was performed. Electrodesiccation Text: The wound bed was treated with electrodesiccation after the biopsy was performed. Electrodesiccation And Curettage Text: The wound bed was treated with electrodesiccation and curettage after the biopsy was performed. Silver Nitrate Text: The wound bed was treated with silver nitrate after the biopsy was performed. Lab: 6 Lab Facility: 3 Consent: Written consent was obtained and risks were reviewed including but not limited to scarring, infection, bleeding, scabbing, incomplete removal, nerve damage and allergy to anesthesia. Post-Care Instructions: I reviewed with the patient in detail post-care instructions. Patient is to keep the biopsy site dry overnight, and then apply bacitracin twice daily until healed. Patient may apply hydrogen peroxide soaks to remove any crusting. Notification Instructions: Patient will be notified of biopsy results. However, patient instructed to call the office if not contacted within 2 weeks. Billing Type: Third-Party Bill Information: Selecting Yes will display possible errors in your note based on the variables you have selected. This validation is only offered as a suggestion for you. PLEASE NOTE THAT THE VALIDATION TEXT WILL BE REMOVED WHEN YOU FINALIZE YOUR NOTE. IF YOU WANT TO FAX A PRELIMINARY NOTE YOU WILL NEED TO TOGGLE THIS TO 'NO' IF YOU DO NOT WANT IT IN YOUR FAXED NOTE. Size Of Lesion In Cm: 1.5

## 2024-09-25 NOTE — SWALLOW BEDSIDE ASSESSMENT ADULT - ADDITIONAL RECOMMENDATIONS
ANTICOAGULATION DIRECT ORAL ANTICOAGULANT MONITORING    SUBJECTIVE     [facility] is evaluating Ritesh Jerry's Apixaban (Eliquis) as part of its Anticoagulation Monitoring Program.    Indication:Atrial Fibrillation  Current dose per medication list: Apixaban 2.5 mg BID  Recent hospitalizations/ED/Office Visits for bleeding/clotting concerns: No  Other bleeding or side effect concerns: Had some bruising on higher dose in 2019.   Additional findings: From OV on 4/5/19 - PLAN:  Decrease Eliquis back to 2.5 mg twice daily given bruises and weight under 60 KG.   OV notes from 6/6/24 - 2. Chronic AC given CHADSVASc 5 (HTN, CAD, age, sex).  She prefers low-dose Eliquis given borderline weight     OBJECTIVE     Age: 88 year old    Wt Readings from Last 2 Encounters:   07/10/24 61.2 kg (135 lb)   06/06/24 62 kg (136 lb 11.2 oz)      Lab Results   Component Value Date    CR 0.77 04/07/2024    CR 1.01 (H) 04/05/2024    CR 0.98 (H) 07/03/2023     Creatinine Clearance (using actual bodyweight, mL/min):     Lab Results   Component Value Date    HGB 11.6 04/07/2024    HGB 14.4 05/27/2021     04/07/2024     05/27/2021     ASSESSMENT/PLAN     A chart review for Direct Oral Anticoagulant (DOAC) Stewardship has been completed for:     Dosing: dose appropriate for age, weight     Plan made per [facility] anticoagulation protocol    Pratima Vail RN  Anticoagulation Clinic    
Discussed with granddaughter importance of using correct measurements for thickening liquids at home.

## 2025-03-21 NOTE — DIETITIAN INITIAL EVALUATION ADULT. - WEIGHT IN KG
Rx Refill Note  Requested Prescriptions     Pending Prescriptions Disp Refills    glucose blood test strip 100 each 12     Sig: Use once a day for blood sugar checks.    Lancets (accu-chek safe-t pro) lancets 200 each 12     Sig: Use once a day for blood sugar checks.      Last office visit with prescribing clinician: 2/6/2025   Last telemedicine visit with prescribing clinician: Visit date not found   Next office visit with prescribing clinician: 4/8/2025                         Would you like a call back once the refill request has been completed: [] Yes [] No    If the office needs to give you a call back, can they leave a voicemail: [] Yes [] No    Riddhi Montelongo MA  03/21/25, 12:59 EDT     All other review of systems negative, except as noted in HPI 64.7